# Patient Record
Sex: MALE | Race: BLACK OR AFRICAN AMERICAN | Employment: OTHER | URBAN - METROPOLITAN AREA
[De-identification: names, ages, dates, MRNs, and addresses within clinical notes are randomized per-mention and may not be internally consistent; named-entity substitution may affect disease eponyms.]

---

## 2018-05-15 ENCOUNTER — TELEPHONE (OUTPATIENT)
Dept: CARDIAC REHAB | Age: 73
End: 2018-05-15

## 2018-05-15 NOTE — TELEPHONE ENCOUNTER
Cardiac Rehab called patient and left a message. Additional attempts at contact will be made.      Thank you,  Elyse Scott

## 2018-05-18 ENCOUNTER — TELEPHONE (OUTPATIENT)
Dept: CARDIAC REHAB | Age: 73
End: 2018-05-18

## 2018-05-18 NOTE — TELEPHONE ENCOUNTER
Cardiac Rehab received a call from patient and explained his referral to his wife. She understands and patient is scheduled for an evaluation on 5/29/18 at 8:00.     Thank you,  Lyly Orozco

## 2018-05-29 ENCOUNTER — HOSPITAL ENCOUNTER (OUTPATIENT)
Dept: CARDIAC REHAB | Age: 73
Discharge: HOME | End: 2018-05-29

## 2018-06-04 ENCOUNTER — HOSPITAL ENCOUNTER (OUTPATIENT)
Dept: CARDIAC REHAB | Age: 73
Discharge: HOME | End: 2018-06-04
Payer: OTHER GOVERNMENT

## 2018-06-04 PROCEDURE — 93798 PHYS/QHP OP CAR RHAB W/ECG: CPT

## 2018-06-04 NOTE — PROGRESS NOTES
[address] Service Date: [id number] Session #:  1 Time In:  4190 Time Out: 1330 O2 with therapy:                            L/Min. Patients carry-over of treatment evident by: First exercise class.      OBJECTIVE Objective Comments: 1   Skin [x] - Warm    [] - Cool     [] - Dry   [] - Moist    [] - Dusky   [] - Pale   [] - Cyanosis   Dependent Edema: [x] - None   OR Location:   Blood Glucose: [x]- NA      Fasting:   Pre-Exercise:  Post-Exercise:   Medications taken as prescribed:   [x] - Yes              [] - No Medication Comments:   Pre SpO2:  97     Pre HR:  94 Pre BP:  100/60 Chest Pain:  0 Weight:  201 Other Pain (1-10) and Location:  1   Post SpO2:   96       Post HR:  84 Post BP:  109/66 Chest Pain: 0 Other Pain (1-10) and Location:  1   ECG interpretation at rest: NSR   ECG interpretation post-exercise:  NSR                 Self-Care/ Education   []-CHF/Heart Disease         []- Heart Attack Warning Signs        [x]- Exercise Safety        []- Risk Factors for CAD        [x]- Medications   []- Breathing Techniques    []- Collaborative Self-Mgt                 []- Hypertension            []-Adequate Sleep                []-  Nutrition                                              []-Cholesterol and Lipids    []- Stress Mgt/Emotional Health      []-Diabetes Mellitus       []-Intimacy Concerns with Heart Disease         []-Travel/Environment         []-Returning pre-cardiac activities   []-Home Exercise    []-Fall Precautions   []-Other:     Patient Response []- NA []- Patient actively participated in education    []- Patient disengaged during education     [] - Able to recite main objectives    [] - Unable to recite main objectives [] - Able to demonstrate     [] - Unable to demonstrate   Educational comments:    EXERCISE PRESCRIPTION:        MODALITY   Time   (min) Speed/Level During Exercise Post Exercise      HR PDS   1-10 PES  1-10 Pain  1-10 HR PDS   1-10 PES  1-10 Pain  1-10   stretching/monitoring/deep breathing 20  90 1 1 1 88 1 1 1   Arms 20 10 100 1 4 1 93 1 1 1   Nusatep 20 1 103 1 5 10  R  .leg 94 1 1 1                                Session # Time in: Time out:   MODALITY   Time   (min) Speed/Level During Exercise Post Exercise      HR PDS   1-10 PES  1-10 Pain  1 - 10 HR PDS   1-10 PES  1-10 Pain   1 - 10   stretching/monitoring/deep breathing                                                                           Patient Response to Exercise Prescription   []- ? Dyspnea                        [] - ? Fatigue                              [] - Abnormal responses:  Explain         [x] - ? Pain (Describe location, description of pain, action(s) taken: Pt says he needs surgery for blockage in R. Leg, but has to get heart stronger first. Leg always hurts when he is walking a lot or exerting it such as Nustep. [] - Experienced no problems; no cuing required            [x]- Good effort               []- Fair effort                   []- Poor   effort        [x]- Good motivation         []- Fair motivation                 []- Poor motivation         [] - Appropriate physiological responses                             Exercise Comments:    ASSESSMENT   []- Patient unable to  progress towards  LTGs due to:  []- Illness       []- Weakness/debility       []- Poor effort       []- Poor Motivation       []- Poor Attendance        [x]- Patient progressing towards LTGs as evident by:    [] Decreased dyspnea on exertion                 [] Decreased fatigue                  [] Improved strength                [] Improved aerobic capacity & endurance                [] Decreased pain               [] Increased pacing          [] Improved emotional health          [] Cigarette reduction / Smoking cessation participation            [] Improved self-monitoring at home          [] Improved nutritional awareness/monitoring          [x] Other (Describe):             Additional Comments: PLAN OF CARE [x] - Continue as written   OR    [] - Adjust treatment plan as follows:           Physician supervision provided this date of service by:      Dr. Eliane Aldana   BILLING Code 83782 (non-monitored): Total minutes:    Code 34888 (monitored):             Total minutes: 60    *ECG rhythm attached      [] 1 CR session only   OR     []  2 CR sessions       [x] 1 CR session only     OR     []  2 CR sessions       *EKG Strip:    Gaby Chávez RN  6/4/2018, 2:48 PM

## 2018-06-06 ENCOUNTER — HOSPITAL ENCOUNTER (OUTPATIENT)
Dept: CARDIAC REHAB | Age: 73
Discharge: HOME | End: 2018-06-06
Payer: OTHER GOVERNMENT

## 2018-06-06 PROCEDURE — 93798 PHYS/QHP OP CAR RHAB W/ECG: CPT

## 2018-06-06 NOTE — PROGRESS NOTES
[address] [facility] Service Date: [id number] Session #:  2 Time In:  3772 Time Out: 1330 O2 with therapy:                            L/Min. Patients carry-over of treatment evident by: Pt returned for 2nd exercise session.      OBJECTIVE Objective Comments: none   Skin [x] - Warm    [] - Cool     [] - Dry   [] - Moist    [] - Dusky   [] - Pale   [] - Cyanosis   Dependent Edema: [x] - None   OR Location:   Blood Glucose: [x]- NA      Fasting:   Pre-Exercise:  Post-Exercise:   Medications taken as prescribed:   [x] - Yes              [] - No Medication Comments:   Pre SpO2:       Pre HR:  91 Pre BP:  124/68 Chest Pain:  0 Weight:  201 Other Pain (1-10) and Location:  1   Post SpO2:          Post HR:  96 Post BP:  118/69 Chest Pain: 0 Other Pain (1-10) and Location:  1 -leg pain stopped when he got off Nustep   ECG interpretation at rest: NSR   ECG interpretation post-exercise:  NSR                 Self-Care/ Education   []-CHF/Heart Disease         []- Heart Attack Warning Signs        [x]- Exercise Safety        []- Risk Factors for CAD        [x]- Medications   []- Breathing Techniques    [x]- Collaborative Self-Mgt                 []- Hypertension            []-Adequate Sleep                []-  Nutrition                                              []-Cholesterol and Lipids    []- Stress Mgt/Emotional Health      []-Diabetes Mellitus       []-Intimacy Concerns with Heart Disease         []-Travel/Environment         []-Returning pre-cardiac activities   []-Home Exercise    []-Fall Precautions   []-Other:     Patient Response []- NA [x]- Patient actively participated in education    []- Patient disengaged during education     [] - Able to recite main objectives    [] - Unable to recite main objectives [] - Able to demonstrate     [] - Unable to demonstrate   Educational comments:    EXERCISE PRESCRIPTION:        MODALITY   Time   (min) Speed/Level During Exercise Post Exercise      HR PDS   1-10 PES  1-10 Pain  1-10 HR PDS   1-10 PES  1-10 Pain  1-10   stretching/monitoring/deep breathing 18  90 1 1 1 88 1 1 1   Nustep 21 4 106 1 5 5 legs 97 1 1 5 legs   Arm 21 4 96 1 5 1 93 1 1 1                                Session # Time in: Time out:   MODALITY   Time   (min) Speed/Level During Exercise Post Exercise      HR PDS   1-10 PES  1-10 Pain  1 - 10 HR PDS   1-10 PES  1-10 Pain   1 - 10   stretching/monitoring/deep breathing                                                                           Patient Response to Exercise Prescription   []- ? Dyspnea                        [] - ? Fatigue                              [] - Abnormal responses:  Explain         [x] - ? Pain (Describe location, description of pain, action(s) taken: Pain in legs while on Nustep, rated 5, pt. Says due to blockages for 2.5 years, hurts worse with weight bearing exercise, such as walking.       [] - Experienced no problems; no cuing required            [x]- Good effort               []- Fair effort                   []- Poor   effort        [x]- Good motivation         []- Fair motivation                 []- Poor motivation         [] - Appropriate physiological responses                             Exercise Comments:    ASSESSMENT   []- Patient unable to  progress towards  LTGs due to:  []- Illness       []- Weakness/debility       []- Poor effort       []- Poor Motivation       []- Poor Attendance        [x]- Patient progressing towards LTGs as evident by:    [] Decreased dyspnea on exertion                 [] Decreased fatigue                  [] Improved strength                [] Improved aerobic capacity & endurance                [] Decreased pain               [] Increased pacing          [] Improved emotional health          [] Cigarette reduction / Smoking cessation participation            [] Improved self-monitoring at home          [] Improved nutritional awareness/monitoring          [x] Other (Describe): 2nd exercise class       Additional Comments:    PLAN OF CARE [x] - Continue as written   OR    [] - Adjust treatment plan as follows:           Physician supervision provided this date of service by:      Dr. Katrin Chavez   BILLING Code 85154 (non-monitored): Total minutes:    Code 54457 (monitored):             Total minutes: 60  *ECG rhythm attached      [] 1 CR session only   OR     []  2 CR sessions       [x] 1 CR session only     OR     []  2 CR sessions       *EKG Strip:    Roddy Dunn RN  6/6/2018, 1:35 PM

## 2018-06-08 ENCOUNTER — HOSPITAL ENCOUNTER (OUTPATIENT)
Dept: CARDIAC REHAB | Age: 73
Discharge: HOME | End: 2018-06-08
Payer: OTHER GOVERNMENT

## 2018-06-08 PROCEDURE — 93798 PHYS/QHP OP CAR RHAB W/ECG: CPT

## 2018-06-08 NOTE — PROGRESS NOTES
[address] [facility] Service Date: [id number] Session #:  3 Time In:  6668 Time Out: 1330 O2 with therapy:                            L/Min. Patients carry-over of treatment evident by: Pt. Went night fishing last night and is sore and tired today. He did bring his water bottle in and said that he hydrates well. To see vascular next week about blockages in leg.    OBJECTIVE Objective Comments: none   Skin [x] - Warm    [] - Cool     [] - Dry   [] - Moist    [] - Dusky   [] - Pale   [] - Cyanosis   Dependent Edema: [x] - None   OR Location:   Blood Glucose: [x]- NA      Fasting:   Pre-Exercise:  Post-Exercise:   Medications taken as prescribed:   [x] - Yes              [] - No Medication Comments:   Pre SpO2:       Pre HR:  91 Pre BP:  122/65 Chest Pain:  0 Weight:  201 Other Pain (1-10) and Location:  1   Post SpO2:          Post HR:  93 Post BP:  130/73 Chest Pain: 0 Other Pain (1-10) and Location:  1   ECG interpretation at rest: NSR   ECG interpretation post-exercise:  NSR                 Self-Care/ Education   []-CHF/Heart Disease         []- Heart Attack Warning Signs        [x]- Exercise Safety        []- Risk Factors for CAD        [x]- Medications   []- Breathing Techniques    [x]- Collaborative Self-Mgt                 []- Hypertension            []-Adequate Sleep                []-  Nutrition                                              []-Cholesterol and Lipids    []- Stress Mgt/Emotional Health      []-Diabetes Mellitus       []-Intimacy Concerns with Heart Disease         []-Travel/Environment         [x]-Returning pre-cardiac activities   [x]-Home Exercise    []-Fall Precautions   []-Other:     Patient Response []- NA [x]- Patient actively participated in education    []- Patient disengaged during education     [] - Able to recite main objectives    [] - Unable to recite main objectives [] - Able to demonstrate     [] - Unable to demonstrate   Educational comments: EXERCISE PRESCRIPTION:        MODALITY   Time   (min) Speed/Level During Exercise Post Exercise      HR PDS   1-10 PES  1-10 Pain  1-10 HR PDS   1-10 PES  1-10 Pain  1-10   stretching/monitoring/deep breathing 20  89 1 1 1 86 1 1 1   Arm Bike 20 15 104 1 4 1 99 1 1 1   Nustep 20 5 97 1 4 1 94 1 1 1                                Session # Time in: Time out:   MODALITY   Time   (min) Speed/Level During Exercise Post Exercise      HR PDS   1-10 PES  1-10 Pain  1 - 10 HR PDS   1-10 PES  1-10 Pain   1 - 10   stretching/monitoring/deep breathing                                                                           Patient Response to Exercise Prescription   []- ? Dyspnea                        [] - ? Fatigue                              [] - Abnormal responses:  Explain         [x] - ? Pain (Describe location, description of pain, action(s) taken: Chronic pain with exercise or exertion in leg due to blockages.       [x] - Experienced no problems; no cuing required            [x]- Good effort               []- Fair effort                   []- Poor   effort        [x]- Good motivation         []- Fair motivation                 []- Poor motivation         [x] - Appropriate physiological responses                             Exercise Comments:    ASSESSMENT   []- Patient unable to  progress towards  LTGs due to:  []- Illness       []- Weakness/debility       []- Poor effort       []- Poor Motivation       []- Poor Attendance        [x]- Patient progressing towards LTGs as evident by:    [] Decreased dyspnea on exertion                 [] Decreased fatigue                  [x] Improved strength                [] Improved aerobic capacity & endurance                [] Decreased pain               [] Increased pacing          [] Improved emotional health          [] Cigarette reduction / Smoking cessation participation            [] Improved self-monitoring at home          [] Improved nutritional awareness/monitoring          [] Other (Describe): Additional Comments:    PLAN OF CARE [x] - Continue as written   OR    [] - Adjust treatment plan as follows:           Physician supervision provided this date of service by:  Dr. Katrin Chavez       BILLING Code 07404 (non-monitored): Total minutes:    Code 45782 (monitored):             Total minutes: 60  *ECG rhythm attached      [] 1 CR session only   OR     []  2 CR sessions       [x] 1 CR session only     OR     []  2 CR sessions       *EKG Strip:    Roddy Dunn RN  6/8/2018, 1:46 PM

## 2018-06-11 ENCOUNTER — TELEPHONE (OUTPATIENT)
Dept: CARDIAC REHAB | Age: 73
End: 2018-06-11

## 2018-06-11 NOTE — TELEPHONE ENCOUNTER
Patient phoned to say that it is raining and he won't be in today, as his scooter cannot go out in the rain. Will return on Wednesday to Rehab.

## 2018-06-15 ENCOUNTER — TELEPHONE (OUTPATIENT)
Dept: CARDIAC REHAB | Age: 73
End: 2018-06-15

## 2018-06-15 NOTE — TELEPHONE ENCOUNTER
Cardiac Rehab called patient to ask about his attendance. He stated he has had a terrible cold and will return on Monday, 6/18/18.      Thank you,  Karen Parker

## 2018-06-18 ENCOUNTER — HOSPITAL ENCOUNTER (OUTPATIENT)
Dept: CARDIAC REHAB | Age: 73
Discharge: HOME | End: 2018-06-18
Payer: OTHER GOVERNMENT

## 2018-06-18 PROCEDURE — 93798 PHYS/QHP OP CAR RHAB W/ECG: CPT

## 2018-06-18 NOTE — PROGRESS NOTES
[address] Service Date: [id number] Session #:  4 Time In:  5487 Time Out: 1330 O2 with therapy:                            L/Min. Patients carry-over of treatment evident by: Pt. Unable to attend last week due to transportation.      OBJECTIVE Objective Comments: none   Skin [x] - Warm    [] - Cool     [] - Dry   [] - Moist    [] - Dusky   [] - Pale   [] - Cyanosis   Dependent Edema: [x] - None   OR Location:   Blood Glucose: [x]- NA      Fasting:   Pre-Exercise:  Post-Exercise:   Medications taken as prescribed:   [x] - Yes              [] - No Medication Comments:   Pre SpO2:       Pre HR:  81 Pre BP:  119/79 Chest Pain:  0 Weight:  203.7 Other Pain (1-10) and Location:  1   Post SpO2:          Post HR:  100 Post BP:  123/68 Chest Pain: 0 Other Pain (1-10) and Location:  1   ECG interpretation at rest: NSR   ECG interpretation post-exercise:  NSR                 Self-Care/ Education   []-CHF/Heart Disease         []- Heart Attack Warning Signs        [x]- Exercise Safety        []- Risk Factors for CAD        [x]- Medications   []- Breathing Techniques    []- Collaborative Self-Mgt                 []- Hypertension            []-Adequate Sleep                []-  Nutrition                                              []-Cholesterol and Lipids    []- Stress Mgt/Emotional Health      []-Diabetes Mellitus       []-Intimacy Concerns with Heart Disease         []-Travel/Environment         []-Returning pre-cardiac activities   []-Home Exercise    []-Fall Precautions   []-Other:     Patient Response []- NA [x]- Patient actively participated in education    []- Patient disengaged during education     [] - Able to recite main objectives    [] - Unable to recite main objectives [] - Able to demonstrate     [] - Unable to demonstrate   Educational comments:    EXERCISE PRESCRIPTION:        MODALITY   Time   (min) Speed/Level During Exercise Post Exercise      HR PDS   1-10 PES  1-10 Pain  1-10 HR PDS   1-10 PES  1-10 Pain  1-10   stretching/monitoring/deep breathing 20  80 1 1 1 75 1 1 1   Arm Bike 20 10 106 1 4 1 99 1 1 1   Nustep 21 4 112 1 4 1 103 1 1 1                                Session # Time in: Time out:   MODALITY   Time   (min) Speed/Level During Exercise Post Exercise      HR PDS   1-10 PES  1-10 Pain  1 - 10 HR PDS   1-10 PES  1-10 Pain   1 - 10   stretching/monitoring/deep breathing                                                                           Patient Response to Exercise Prescription   []- ? Dyspnea                        [] - ? Fatigue                              [] - Abnormal responses:  Explain         [] - ? Pain (Describe location, description of pain, action(s) taken:         [x] - Experienced no problems; no cuing required            [x]- Good effort               []- Fair effort                   []- Poor   effort        [x]- Good motivation         []- Fair motivation                 []- Poor motivation         [x] - Appropriate physiological responses                             Exercise Comments:    ASSESSMENT   []- Patient unable to  progress towards  LTGs due to:  []- Illness       []- Weakness/debility       []- Poor effort       []- Poor Motivation       []- Poor Attendance        [x]- Patient progressing towards LTGs as evident by:    [] Decreased dyspnea on exertion                 [] Decreased fatigue                  [x] Improved strength                [x] Improved aerobic capacity & endurance                [] Decreased pain               [] Increased pacing          [] Improved emotional health          [] Cigarette reduction / Smoking cessation participation            [] Improved self-monitoring at home          [] Improved nutritional awareness/monitoring          [] Other (Describe):             Additional Comments:    PLAN OF CARE [x] - Continue as written   OR    [] - Adjust treatment plan as follows:           Physician supervision provided this date of service by:         BILLING Code 59219 (non-monitored): Total minutes:    Code 29952 (monitored):             Total minutes: 61  *ECG rhythm attached      [] 1 CR session only   OR     []  2 CR sessions       [x] 1 CR session only     OR     []  2 CR sessions       *EKG Strip:    Tyron See RN  6/18/2018, 3:42 PM

## 2018-06-20 ENCOUNTER — TELEPHONE (OUTPATIENT)
Dept: CARDIAC REHAB | Age: 73
End: 2018-06-20

## 2018-06-20 NOTE — TELEPHONE ENCOUNTER
Cardiac Rehab called patient to talk about his absence today and also about his schedule for next week. He stated that he was aching today and could not come in. He will be here next Tuesday and Wednesday (6/26/18 and 6/27/18) at 12:30.     Thank you,  Aileen Suarez

## 2018-06-22 ENCOUNTER — APPOINTMENT (OUTPATIENT)
Dept: CARDIAC REHAB | Age: 73
End: 2018-06-22
Payer: OTHER GOVERNMENT

## 2018-06-25 ENCOUNTER — APPOINTMENT (OUTPATIENT)
Dept: CARDIAC REHAB | Age: 73
End: 2018-06-25
Payer: OTHER GOVERNMENT

## 2018-06-26 ENCOUNTER — TELEPHONE (OUTPATIENT)
Dept: CARDIAC REHAB | Age: 73
End: 2018-06-26

## 2018-06-26 NOTE — TELEPHONE ENCOUNTER
Cardiac Rehab called patient to talk to him about absence from his appointment today. Patient was coughing and was extremely short of breath. He stated that he is under the weather and might be going to the hospital shortly if symptoms don't get better. Will follow up for an update.      Thank you,  Karen Parker

## 2018-06-27 ENCOUNTER — APPOINTMENT (OUTPATIENT)
Dept: CARDIAC REHAB | Age: 73
End: 2018-06-27
Payer: OTHER GOVERNMENT

## 2018-06-29 ENCOUNTER — APPOINTMENT (OUTPATIENT)
Dept: CARDIAC REHAB | Age: 73
End: 2018-06-29
Payer: OTHER GOVERNMENT

## 2018-07-02 ENCOUNTER — HOSPITAL ENCOUNTER (OUTPATIENT)
Dept: CARDIAC REHAB | Age: 73
Discharge: HOME | End: 2018-07-02
Payer: OTHER GOVERNMENT

## 2018-07-02 VITALS — WEIGHT: 200 LBS | BODY MASS INDEX: 31.32 KG/M2

## 2018-07-02 PROCEDURE — 93798 PHYS/QHP OP CAR RHAB W/ECG: CPT

## 2018-07-06 ENCOUNTER — HOSPITAL ENCOUNTER (OUTPATIENT)
Dept: CARDIAC REHAB | Age: 73
Discharge: HOME | End: 2018-07-06
Payer: OTHER GOVERNMENT

## 2018-07-06 VITALS — WEIGHT: 200 LBS | BODY MASS INDEX: 31.32 KG/M2

## 2018-07-06 PROCEDURE — 93798 PHYS/QHP OP CAR RHAB W/ECG: CPT

## 2018-07-09 ENCOUNTER — HOSPITAL ENCOUNTER (OUTPATIENT)
Dept: CARDIAC REHAB | Age: 73
Discharge: HOME | End: 2018-07-09
Payer: OTHER GOVERNMENT

## 2018-07-09 VITALS — BODY MASS INDEX: 32.26 KG/M2 | WEIGHT: 206 LBS

## 2018-07-09 PROCEDURE — 93798 PHYS/QHP OP CAR RHAB W/ECG: CPT

## 2018-07-11 ENCOUNTER — HOSPITAL ENCOUNTER (OUTPATIENT)
Dept: CARDIAC REHAB | Age: 73
Discharge: HOME | End: 2018-07-11
Payer: OTHER GOVERNMENT

## 2018-07-11 VITALS — BODY MASS INDEX: 31.95 KG/M2 | WEIGHT: 204 LBS

## 2018-07-11 PROCEDURE — 93798 PHYS/QHP OP CAR RHAB W/ECG: CPT

## 2018-07-13 ENCOUNTER — HOSPITAL ENCOUNTER (OUTPATIENT)
Dept: CARDIAC REHAB | Age: 73
Discharge: HOME | End: 2018-07-13
Payer: OTHER GOVERNMENT

## 2018-07-13 VITALS — WEIGHT: 204 LBS | BODY MASS INDEX: 31.95 KG/M2

## 2018-07-13 PROCEDURE — 93798 PHYS/QHP OP CAR RHAB W/ECG: CPT

## 2018-07-16 ENCOUNTER — HOSPITAL ENCOUNTER (OUTPATIENT)
Dept: CARDIAC REHAB | Age: 73
Discharge: HOME | End: 2018-07-16
Payer: OTHER GOVERNMENT

## 2018-07-16 VITALS — BODY MASS INDEX: 31.95 KG/M2 | WEIGHT: 204 LBS

## 2018-07-16 PROCEDURE — 93798 PHYS/QHP OP CAR RHAB W/ECG: CPT

## 2018-07-23 ENCOUNTER — TELEPHONE (OUTPATIENT)
Dept: CARDIAC REHAB | Age: 73
End: 2018-07-23

## 2018-07-23 NOTE — TELEPHONE ENCOUNTER
Cardiac Rehab called patient and left a message about his absence for his past few scheduled visits. Additional attempts at contact will be made.      Thank you,  Brandon Plaza

## 2018-07-25 ENCOUNTER — TELEPHONE (OUTPATIENT)
Dept: CARDIAC REHAB | Age: 73
End: 2018-07-25

## 2018-07-25 NOTE — TELEPHONE ENCOUNTER
Cardiac Rehab called patient and left a message about his absence. Additional attempts at contact will be made.      Thank you,  Toni Dawson

## 2018-07-27 ENCOUNTER — TELEPHONE (OUTPATIENT)
Dept: CARDIAC REHAB | Age: 73
End: 2018-07-27

## 2018-07-27 NOTE — TELEPHONE ENCOUNTER
Cardiac Rehab called patient and left a message about his absences. Additional attempts at contact will be made.      Thank you,  Carlos Nuno

## 2018-07-30 ENCOUNTER — TELEPHONE (OUTPATIENT)
Dept: CARDIAC REHAB | Age: 73
End: 2018-07-30

## 2018-07-30 NOTE — TELEPHONE ENCOUNTER
Cardiac Rehab called patient and left a message about his absence. Additional attempts at contact will be made.      Thank you,  Girma Fitzpatrick

## 2018-08-03 ENCOUNTER — APPOINTMENT (OUTPATIENT)
Dept: CARDIAC REHAB | Age: 73
End: 2018-08-03

## 2018-08-03 ENCOUNTER — TELEPHONE (OUTPATIENT)
Dept: CARDIAC REHAB | Age: 73
End: 2018-08-03

## 2018-08-03 NOTE — TELEPHONE ENCOUNTER
Cardiac Rehab called patient and spoke to him about his absence. He said he has not been feeling well and wishes to be discharged.      Thank you,  Seble Desouza

## 2018-08-06 ENCOUNTER — APPOINTMENT (OUTPATIENT)
Dept: CARDIAC REHAB | Age: 73
End: 2018-08-06

## 2018-08-08 ENCOUNTER — APPOINTMENT (OUTPATIENT)
Dept: CARDIAC REHAB | Age: 73
End: 2018-08-08

## 2018-08-10 ENCOUNTER — APPOINTMENT (OUTPATIENT)
Dept: CARDIAC REHAB | Age: 73
End: 2018-08-10

## 2018-08-13 ENCOUNTER — APPOINTMENT (OUTPATIENT)
Dept: CARDIAC REHAB | Age: 73
End: 2018-08-13

## 2018-08-15 ENCOUNTER — APPOINTMENT (OUTPATIENT)
Dept: CARDIAC REHAB | Age: 73
End: 2018-08-15

## 2018-08-17 ENCOUNTER — APPOINTMENT (OUTPATIENT)
Dept: CARDIAC REHAB | Age: 73
End: 2018-08-17

## 2018-08-20 ENCOUNTER — APPOINTMENT (OUTPATIENT)
Dept: CARDIAC REHAB | Age: 73
End: 2018-08-20

## 2018-08-22 ENCOUNTER — APPOINTMENT (OUTPATIENT)
Dept: CARDIAC REHAB | Age: 73
End: 2018-08-22

## 2018-08-24 ENCOUNTER — APPOINTMENT (OUTPATIENT)
Dept: CARDIAC REHAB | Age: 73
End: 2018-08-24

## 2019-07-29 ENCOUNTER — HOSPITAL ENCOUNTER (INPATIENT)
Age: 74
LOS: 1 days | Discharge: HOME | DRG: 195 | End: 2019-07-30
Attending: EMERGENCY MEDICINE | Admitting: FAMILY MEDICINE
Payer: MEDICARE

## 2019-07-29 ENCOUNTER — APPOINTMENT (OUTPATIENT)
Dept: GENERAL RADIOLOGY | Age: 74
DRG: 195 | End: 2019-07-29
Attending: EMERGENCY MEDICINE
Payer: MEDICARE

## 2019-07-29 DIAGNOSIS — J18.9 COMMUNITY ACQUIRED PNEUMONIA, UNSPECIFIED LATERALITY: Primary | ICD-10-CM

## 2019-07-29 LAB
ANION GAP SERPL CALC-SCNC: 7 MMOL/L (ref 3–18)
APPEARANCE UR: CLEAR
ATRIAL RATE: 75 BPM
BASOPHILS # BLD: 0 K/UL (ref 0–0.1)
BASOPHILS NFR BLD: 0 % (ref 0–2)
BILIRUB UR QL: NEGATIVE
BUN SERPL-MCNC: 26 MG/DL (ref 7–18)
BUN/CREAT SERPL: 25 (ref 12–20)
CALCIUM SERPL-MCNC: 9.4 MG/DL (ref 8.5–10.1)
CALCULATED P AXIS, ECG09: 48 DEGREES
CALCULATED R AXIS, ECG10: -8 DEGREES
CALCULATED T AXIS, ECG11: 54 DEGREES
CHLORIDE SERPL-SCNC: 108 MMOL/L (ref 100–111)
CK MB CFR SERPL CALC: 2.9 % (ref 0–4)
CK MB CFR SERPL CALC: 3.9 % (ref 0–4)
CK MB SERPL-MCNC: 1.3 NG/ML (ref 5–25)
CK MB SERPL-MCNC: 1.5 NG/ML (ref 5–25)
CK SERPL-CCNC: 33 U/L (ref 39–308)
CK SERPL-CCNC: 52 U/L (ref 39–308)
CO2 SERPL-SCNC: 26 MMOL/L (ref 21–32)
COLOR UR: YELLOW
CREAT SERPL-MCNC: 1.04 MG/DL (ref 0.6–1.3)
DIAGNOSIS, 93000: NORMAL
DIFFERENTIAL METHOD BLD: ABNORMAL
EOSINOPHIL # BLD: 0.3 K/UL (ref 0–0.4)
EOSINOPHIL NFR BLD: 4 % (ref 0–5)
ERYTHROCYTE [DISTWIDTH] IN BLOOD BY AUTOMATED COUNT: 14.6 % (ref 11.6–14.5)
EST. AVERAGE GLUCOSE BLD GHB EST-MCNC: 160 MG/DL
GLUCOSE BLD STRIP.AUTO-MCNC: 130 MG/DL (ref 70–110)
GLUCOSE BLD STRIP.AUTO-MCNC: 146 MG/DL (ref 70–110)
GLUCOSE SERPL-MCNC: 132 MG/DL (ref 74–99)
GLUCOSE UR STRIP.AUTO-MCNC: 500 MG/DL
HBA1C MFR BLD: 7.2 % (ref 4.2–5.6)
HCT VFR BLD AUTO: 42.2 % (ref 36–48)
HGB BLD-MCNC: 14.1 G/DL (ref 13–16)
HGB UR QL STRIP: NEGATIVE
KETONES UR QL STRIP.AUTO: NEGATIVE MG/DL
LACTATE SERPL-SCNC: 1.6 MMOL/L (ref 0.4–2)
LEUKOCYTE ESTERASE UR QL STRIP.AUTO: NEGATIVE
LYMPHOCYTES # BLD: 2 K/UL (ref 0.9–3.6)
LYMPHOCYTES NFR BLD: 21 % (ref 21–52)
MCH RBC QN AUTO: 30.2 PG (ref 24–34)
MCHC RBC AUTO-ENTMCNC: 33.4 G/DL (ref 31–37)
MCV RBC AUTO: 90.4 FL (ref 74–97)
MONOCYTES # BLD: 0.7 K/UL (ref 0.05–1.2)
MONOCYTES NFR BLD: 7 % (ref 3–10)
NEUTS SEG # BLD: 6.5 K/UL (ref 1.8–8)
NEUTS SEG NFR BLD: 68 % (ref 40–73)
NITRITE UR QL STRIP.AUTO: NEGATIVE
P-R INTERVAL, ECG05: 178 MS
PH UR STRIP: 5 [PH] (ref 5–8)
PLATELET # BLD AUTO: 288 K/UL (ref 135–420)
PMV BLD AUTO: 8.4 FL (ref 9.2–11.8)
POTASSIUM SERPL-SCNC: 4.1 MMOL/L (ref 3.5–5.5)
PROT UR STRIP-MCNC: NEGATIVE MG/DL
Q-T INTERVAL, ECG07: 402 MS
QRS DURATION, ECG06: 76 MS
QTC CALCULATION (BEZET), ECG08: 448 MS
RBC # BLD AUTO: 4.67 M/UL (ref 4.7–5.5)
SODIUM SERPL-SCNC: 141 MMOL/L (ref 136–145)
SP GR UR REFRACTOMETRY: 1.01 (ref 1–1.03)
TROPONIN I SERPL-MCNC: <0.02 NG/ML (ref 0–0.04)
TROPONIN I SERPL-MCNC: <0.02 NG/ML (ref 0–0.04)
UROBILINOGEN UR QL STRIP.AUTO: 0.2 EU/DL (ref 0.2–1)
VENTRICULAR RATE, ECG03: 75 BPM
WBC # BLD AUTO: 9.5 K/UL (ref 4.6–13.2)

## 2019-07-29 PROCEDURE — 74011250637 HC RX REV CODE- 250/637: Performed by: FAMILY MEDICINE

## 2019-07-29 PROCEDURE — 74011250636 HC RX REV CODE- 250/636: Performed by: EMERGENCY MEDICINE

## 2019-07-29 PROCEDURE — 71045 X-RAY EXAM CHEST 1 VIEW: CPT

## 2019-07-29 PROCEDURE — 36415 COLL VENOUS BLD VENIPUNCTURE: CPT

## 2019-07-29 PROCEDURE — 87040 BLOOD CULTURE FOR BACTERIA: CPT

## 2019-07-29 PROCEDURE — 74011000250 HC RX REV CODE- 250: Performed by: EMERGENCY MEDICINE

## 2019-07-29 PROCEDURE — 96360 HYDRATION IV INFUSION INIT: CPT

## 2019-07-29 PROCEDURE — 82550 ASSAY OF CK (CPK): CPT

## 2019-07-29 PROCEDURE — 74011250636 HC RX REV CODE- 250/636: Performed by: FAMILY MEDICINE

## 2019-07-29 PROCEDURE — 65270000029 HC RM PRIVATE

## 2019-07-29 PROCEDURE — 83036 HEMOGLOBIN GLYCOSYLATED A1C: CPT

## 2019-07-29 PROCEDURE — 82962 GLUCOSE BLOOD TEST: CPT

## 2019-07-29 PROCEDURE — 93005 ELECTROCARDIOGRAM TRACING: CPT

## 2019-07-29 PROCEDURE — 83605 ASSAY OF LACTIC ACID: CPT

## 2019-07-29 PROCEDURE — 80048 BASIC METABOLIC PNL TOTAL CA: CPT

## 2019-07-29 PROCEDURE — 85025 COMPLETE CBC W/AUTO DIFF WBC: CPT

## 2019-07-29 PROCEDURE — 99285 EMERGENCY DEPT VISIT HI MDM: CPT

## 2019-07-29 PROCEDURE — 81003 URINALYSIS AUTO W/O SCOPE: CPT

## 2019-07-29 RX ORDER — SODIUM CHLORIDE 0.9 % (FLUSH) 0.9 %
5-40 SYRINGE (ML) INJECTION AS NEEDED
Status: DISCONTINUED | OUTPATIENT
Start: 2019-07-29 | End: 2019-07-30 | Stop reason: HOSPADM

## 2019-07-29 RX ORDER — FLUOXETINE 10 MG/1
10 CAPSULE ORAL DAILY
Status: DISCONTINUED | OUTPATIENT
Start: 2019-07-30 | End: 2019-07-30 | Stop reason: HOSPADM

## 2019-07-29 RX ORDER — TRAZODONE HYDROCHLORIDE 50 MG/1
50 TABLET ORAL
Status: DISCONTINUED | OUTPATIENT
Start: 2019-07-29 | End: 2019-07-30 | Stop reason: HOSPADM

## 2019-07-29 RX ORDER — INSULIN LISPRO 100 [IU]/ML
INJECTION, SOLUTION INTRAVENOUS; SUBCUTANEOUS
Status: DISCONTINUED | OUTPATIENT
Start: 2019-07-29 | End: 2019-07-30 | Stop reason: HOSPADM

## 2019-07-29 RX ORDER — MAGNESIUM SULFATE 100 %
4 CRYSTALS MISCELLANEOUS AS NEEDED
Status: DISCONTINUED | OUTPATIENT
Start: 2019-07-29 | End: 2019-07-30 | Stop reason: HOSPADM

## 2019-07-29 RX ORDER — HEPARIN SODIUM 5000 [USP'U]/ML
5000 INJECTION, SOLUTION INTRAVENOUS; SUBCUTANEOUS EVERY 8 HOURS
Status: DISCONTINUED | OUTPATIENT
Start: 2019-07-29 | End: 2019-07-30 | Stop reason: HOSPADM

## 2019-07-29 RX ORDER — DEXTROSE 50 % IN WATER (D50W) INTRAVENOUS SYRINGE
25-50 AS NEEDED
Status: DISCONTINUED | OUTPATIENT
Start: 2019-07-29 | End: 2019-07-29 | Stop reason: CLARIF

## 2019-07-29 RX ORDER — ACETAMINOPHEN 325 MG/1
650 TABLET ORAL
Status: DISCONTINUED | OUTPATIENT
Start: 2019-07-29 | End: 2019-07-30 | Stop reason: HOSPADM

## 2019-07-29 RX ORDER — GABAPENTIN 100 MG/1
100 CAPSULE ORAL 3 TIMES DAILY
Status: DISCONTINUED | OUTPATIENT
Start: 2019-07-29 | End: 2019-07-30 | Stop reason: HOSPADM

## 2019-07-29 RX ORDER — SODIUM CHLORIDE 0.9 % (FLUSH) 0.9 %
5-40 SYRINGE (ML) INJECTION EVERY 8 HOURS
Status: DISCONTINUED | OUTPATIENT
Start: 2019-07-29 | End: 2019-07-30 | Stop reason: HOSPADM

## 2019-07-29 RX ORDER — DEXTROSE MONOHYDRATE 100 MG/ML
125-250 INJECTION, SOLUTION INTRAVENOUS AS NEEDED
Status: DISCONTINUED | OUTPATIENT
Start: 2019-07-29 | End: 2019-07-30 | Stop reason: HOSPADM

## 2019-07-29 RX ADMIN — GABAPENTIN 100 MG: 100 CAPSULE ORAL at 18:01

## 2019-07-29 RX ADMIN — TRAZODONE HYDROCHLORIDE 50 MG: 50 TABLET ORAL at 22:39

## 2019-07-29 RX ADMIN — GABAPENTIN 100 MG: 100 CAPSULE ORAL at 22:39

## 2019-07-29 RX ADMIN — CEFTRIAXONE SODIUM 2 G: 2 INJECTION, POWDER, FOR SOLUTION INTRAMUSCULAR; INTRAVENOUS at 13:05

## 2019-07-29 RX ADMIN — SODIUM CHLORIDE 1000 ML: 900 INJECTION, SOLUTION INTRAVENOUS at 11:30

## 2019-07-29 RX ADMIN — Medication 10 ML: at 18:05

## 2019-07-29 RX ADMIN — Medication 10 ML: at 22:41

## 2019-07-29 RX ADMIN — HEPARIN SODIUM 5000 UNITS: 5000 INJECTION INTRAVENOUS; SUBCUTANEOUS at 18:01

## 2019-07-29 RX ADMIN — AZITHROMYCIN MONOHYDRATE 500 MG: 500 INJECTION, POWDER, LYOPHILIZED, FOR SOLUTION INTRAVENOUS at 13:05

## 2019-07-29 RX ADMIN — SODIUM CHLORIDE 1000 ML: 900 INJECTION, SOLUTION INTRAVENOUS at 13:05

## 2019-07-29 NOTE — ED TRIAGE NOTES
Received pt via EMS. Pt originally complained of chest pain, denied chest pain once medics arrived. He took one nitro at home. In the ambulance, pt took a deep breath and then c/o chest pain again. He takes blood thinners. He has hx of CABG and type 1 diabetes. C/o chest pain 4/10 left of sternum.

## 2019-07-29 NOTE — ED PROVIDER NOTES
EMERGENCY DEPARTMENT HISTORY AND PHYSICAL EXAM    2:03 PM  Date: 7/29/2019  Patient Name: Charlie Valdez    History of Presenting Illness     Chief Complaint   Patient presents with    Chest Pain        History Provided By: Patient    HPI: Charlie Valdez is a 75 y.o. male with history of diabetes and alcohol abuse. Patient is presenting with around 8. He which helped to relieve the pain but he felt diaphoretic and dizzy afterwards. He is also reporting nonproductive cough for the past 2 days chills. No nausea or vomiting. No shortness of breath. Denies history of recent hospital admissions or travel, denies history of prior PE    Location:  Severity:  Timing/course: Onset/Duration:     PCP: Other, MD Amanda    Past History     Past Medical History:  Past Medical History:   Diagnosis Date    Arthritis     Depression     Diabetes ([address]     ETOH abuse     Psychiatric disorder     PTSD       Past Surgical History:  No past surgical history on file. Family History:  No family history on file. Social History:  Social History     Tobacco Use    Smoking status: Current Every Day Smoker     Packs/day: 1.00   Substance Use Topics    Alcohol use: Yes    Drug use: Not on file       Allergies: Allergies   Allergen Reactions    Iodine Unknown (comments)    Shellfish Derived Unknown (comments)       Review of Systems   Review of Systems   Constitutional: Positive for diaphoresis. Respiratory: Positive for cough and chest tightness. Cardiovascular: Positive for chest pain. All other systems reviewed and are negative. Physical Exam     Patient Vitals for the past 12 hrs:   Temp Pulse Resp BP SpO2   07/29/19 1034 98.5 °F (36.9 °C) 76 16 103/62 100 %   07/29/19 1033 -- -- -- 103/62 99 %       Physical Exam   Constitutional: He is oriented to person, place, and time. He appears well-developed and well-nourished. No distress. HENT:   Head: Normocephalic and atraumatic.    Eyes: Conjunctivae and EOM are normal.   Neck: Normal range of motion. Neck supple. Cardiovascular: Normal rate and normal heart sounds. Pulmonary/Chest: Effort normal and breath sounds normal. No respiratory distress. Musculoskeletal: Normal range of motion. He exhibits no edema or deformity. Neurological: He is alert and oriented to person, place, and time. Skin: Skin is warm and dry. Psychiatric: He has a normal mood and affect. His behavior is normal. Judgment and thought content normal.       Diagnostic Study Results     Labs -  Recent Results (from the past 12 hour(s))   CBC WITH AUTOMATED DIFF    Collection Time: 07/29/19 10:45 AM   Result Value Ref Range    WBC 9.5 4.6 - 13.2 K/uL    RBC 4.67 (L) 4.70 - 5.50 M/uL    HGB 14.1 13.0 - 16.0 g/dL    HCT 42.2 36.0 - 48.0 %    MCV 90.4 74.0 - 97.0 FL    MCH 30.2 24.0 - 34.0 PG    MCHC 33.4 31.0 - 37.0 g/dL    RDW 14.6 (H) 11.6 - 14.5 %    PLATELET 759 984 - 701 K/uL    MPV 8.4 (L) 9.2 - 11.8 FL    NEUTROPHILS 68 40 - 73 %    LYMPHOCYTES 21 21 - 52 %    MONOCYTES 7 3 - 10 %    EOSINOPHILS 4 0 - 5 %    BASOPHILS 0 0 - 2 %    ABS. NEUTROPHILS 6.5 1.8 - 8.0 K/UL    ABS. LYMPHOCYTES 2.0 0.9 - 3.6 K/UL    ABS. MONOCYTES 0.7 0.05 - 1.2 K/UL    ABS. EOSINOPHILS 0.3 0.0 - 0.4 K/UL    ABS.  BASOPHILS 0.0 0.0 - 0.1 K/UL    DF AUTOMATED     METABOLIC PANEL, BASIC    Collection Time: 07/29/19 10:45 AM   Result Value Ref Range    Sodium 141 136 - 145 mmol/L    Potassium 4.1 3.5 - 5.5 mmol/L    Chloride 108 100 - 111 mmol/L    CO2 26 21 - 32 mmol/L    Anion gap 7 3.0 - 18 mmol/L    Glucose 132 (H) 74 - 99 mg/dL    BUN 26 (H) 7.0 - 18 MG/DL    Creatinine 1.04 0.6 - 1.3 MG/DL    BUN/Creatinine ratio 25 (H) 12 - 20      GFR est AA >60 >60 ml/min/1.73m2    GFR est non-AA >60 >60 ml/min/1.73m2    Calcium 9.4 8.5 - 10.1 MG/DL   EKG, 12 LEAD, INITIAL    Collection Time: 07/29/19 10:47 AM   Result Value Ref Range    Ventricular Rate 75 BPM    Atrial Rate 75 BPM    P-R Interval 178 ms    QRS Duration 76 ms    Q-T Interval 402 ms    QTC Calculation (Bezet) 448 ms    Calculated P Axis 48 degrees    Calculated R Axis -8 degrees    Calculated T Axis 54 degrees    Diagnosis       Normal sinus rhythm  Inferior infarct , age undetermined  Anteroseptal infarct (cited on or before 29-JUL-2019)  Abnormal ECG  When compared with ECG of 03-AUG-2014 11:54,  Inferior infarct is now present  Questionable change in initial forces of Septal leads  Confirmed by Shahid Huffman MD, --- ([id number]) on 7/29/2019 1:20:45 PM     URINALYSIS W/ RFLX MICROSCOPIC    Collection Time: 07/29/19 11:20 AM   Result Value Ref Range    Color YELLOW      Appearance CLEAR      Specific gravity 1.008 1.005 - 1.030      pH (UA) 5.0 5.0 - 8.0      Protein NEGATIVE  NEG mg/dL    Glucose 500 (A) NEG mg/dL    Ketone NEGATIVE  NEG mg/dL    Bilirubin NEGATIVE  NEG      Blood NEGATIVE  NEG      Urobilinogen 0.2 0.2 - 1.0 EU/dL    Nitrites NEGATIVE  NEG      Leukocyte Esterase NEGATIVE  NEG         Radiologic Studies -   Xr Chest Port    Result Date: 7/29/2019  IMPRESSION: Lower lung zone hazy streaky densities suggestive of bilateral pneumonia vs. atelectatic changes vs. aspiration. Medical Decision Making     ED Course: Progress Notes, Reevaluation, and Consults:    2:03 PM Initial assessment performed. The patients presenting problems have been discussed, and they/their family are in agreement with the care plan formulated and outlined with them. I have encouraged them to ask questions as they arise throughout their visit. X-ray shows pneumonia. We will add cultures and start antibiotic coverage for community-acquired pneumonia. Patient had sugar initially but is likely related to the nature he took he is well-hydrated and not tachycardic and febrile do not think this is sepsis but I will give him a liter of IV fluids and provide more as needed. I also contact the hospitalist for admission.       Provider Notes (Medical Decision Making): 44-year-old gentleman presenting with sternal nonexertional chest pain was alleviated by nitro followed by diaphoresis. Is concerning for cardiac etiology. Cardiac work-up and give her aspirin. Also has a history of cough so so we will get him worked up for that as well. Procedures:     Critical Care Time:     Vital Signs-Reviewed the patient's vital signs. Reviewed pt's pulse ox reading. EKG: Interpreted by the EP. Time Interpreted:    Rate:    Rhythm: Normal Sinus Rhythm 75   Interpretation: T wave inversions in V1   Comparison: Significant interval changes    Records Reviewed: Nursing Notes, Old Medical Records and Previous electrocardiograms (Time of Review: 2:03 PM)  -I am the first provider for this patient.  -I reviewed the vital signs, available nursing notes, past medical history, past surgical history, family history and social history. Current Facility-Administered Medications   Medication Dose Route Frequency Provider Last Rate Last Dose    cefTRIAXone (ROCEPHIN) 2 g in sterile water (preservative free) 20 mL IV syringe  2 g IntraVENous Q24H Brooke Cavanaugh MD   2 g at 07/29/19 1305    azithromycin (ZITHROMAX) 500 mg in  mL  500 mg IntraVENous Q24H Brooke Cavanaugh MD   500 mg at 07/29/19 1305     Current Outpatient Medications   Medication Sig Dispense Refill    oxyCODONE-acetaminophen (PERCOCET) 5-325 mg per tablet Take 2 Tabs by mouth every six (6) hours as needed for Pain. 30 Tab 0    metFORMIN (GLUCOPHAGE) 1,000 mg tablet Take 1,000 mg by mouth daily.  traZODone (DESYREL) 50 mg tablet Take 50 mg by mouth nightly.  gabapentin (NEURONTIN) 100 mg capsule Take 100 mg by mouth three (3) times daily.  FLUoxetine (PROZAC) 10 mg capsule Take 10 mg by mouth daily.  insulin aspart protamine/insulin aspart (NOVOLOG MIX 70/30) 100 unit/mL (70-30) flex pen by SubCUTAneous route. Clinical Impression     Clinical Impression: No diagnosis found.     Disposition: admit      This note was dictated utilizing voice recognition software which may lead to typographical errors. I apologize in advance if the situation occurs. If questions arise please do not hesitate to contact me or call our department.     Brooke Lai MD  2:03 PM

## 2019-07-29 NOTE — ED NOTES
TRANSFER - OUT REPORT:    Verbal report given to Bonita Finch RN on [place]  being transferred to [address] [place] [address] for routine progression of care       Report consisted of patients Situation, Background, Assessment and   Recommendations(SBAR). Information from the following report(s) SBAR, Kardex, ED Summary and MAR was reviewed with the receiving nurse. Lines:   Peripheral IV 07/29/19 Left Antecubital (Active)        Opportunity for questions and clarification was provided.       Patient transported with:  Pt delisa

## 2019-07-30 ENCOUNTER — APPOINTMENT (OUTPATIENT)
Dept: NON INVASIVE DIAGNOSTICS | Age: 74
DRG: 195 | End: 2019-07-30
Attending: NURSE PRACTITIONER
Payer: MEDICARE

## 2019-07-30 VITALS
SYSTOLIC BLOOD PRESSURE: 117 MMHG | HEIGHT: 67 IN | RESPIRATION RATE: 18 BRPM | OXYGEN SATURATION: 97 % | BODY MASS INDEX: 32.33 KG/M2 | HEART RATE: 79 BPM | DIASTOLIC BLOOD PRESSURE: 65 MMHG | TEMPERATURE: 98.5 F | WEIGHT: 206 LBS

## 2019-07-30 LAB
ANION GAP SERPL CALC-SCNC: 7 MMOL/L (ref 3–18)
BASOPHILS # BLD: 0 K/UL (ref 0–0.1)
BASOPHILS NFR BLD: 0 % (ref 0–2)
BUN SERPL-MCNC: 25 MG/DL (ref 7–18)
BUN/CREAT SERPL: 29 (ref 12–20)
CALCIUM SERPL-MCNC: 8.9 MG/DL (ref 8.5–10.1)
CHLORIDE SERPL-SCNC: 110 MMOL/L (ref 100–111)
CK MB CFR SERPL CALC: 2.4 % (ref 0–4)
CK MB SERPL-MCNC: 1.2 NG/ML (ref 5–25)
CK SERPL-CCNC: 49 U/L (ref 39–308)
CO2 SERPL-SCNC: 25 MMOL/L (ref 21–32)
CREAT SERPL-MCNC: 0.87 MG/DL (ref 0.6–1.3)
DIFFERENTIAL METHOD BLD: ABNORMAL
ECHO AO ROOT DIAM: 3.16 CM
ECHO LV INTERNAL DIMENSION DIASTOLIC: 4.08 CM (ref 4.2–5.9)
ECHO LV INTERNAL DIMENSION SYSTOLIC: 2.52 CM
ECHO LV IVSD: 0.89 CM (ref 0.6–1)
ECHO LV MASS 2D: 140.6 G (ref 88–224)
ECHO LV MASS INDEX 2D: 68.7 G/M2 (ref 49–115)
ECHO LV POSTERIOR WALL DIASTOLIC: 1.03 CM (ref 0.6–1)
ECHO LVOT DIAM: 1.94 CM
ECHO LVOT PEAK GRADIENT: 2.9 MMHG
ECHO LVOT PEAK VELOCITY: 84.48 CM/S
ECHO LVOT VTI: 16.68 CM
ECHO MV A VELOCITY: 62.73 CM/S
ECHO MV E DECELERATION TIME (DT): 149.4 MS
ECHO MV E VELOCITY: 68.39 CM/S
ECHO MV E/A RATIO: 1.09
ECHO TV REGURGITANT MAX VELOCITY: 248.86 CM/S
ECHO TV REGURGITANT PEAK GRADIENT: 24.8 MMHG
EOSINOPHIL # BLD: 0.3 K/UL (ref 0–0.4)
EOSINOPHIL NFR BLD: 3 % (ref 0–5)
ERYTHROCYTE [DISTWIDTH] IN BLOOD BY AUTOMATED COUNT: 14.7 % (ref 11.6–14.5)
GLUCOSE BLD STRIP.AUTO-MCNC: 112 MG/DL (ref 70–110)
GLUCOSE BLD STRIP.AUTO-MCNC: 243 MG/DL (ref 70–110)
GLUCOSE SERPL-MCNC: 126 MG/DL (ref 74–99)
HCT VFR BLD AUTO: 38.5 % (ref 36–48)
HGB BLD-MCNC: 12.7 G/DL (ref 13–16)
LYMPHOCYTES # BLD: 2.3 K/UL (ref 0.9–3.6)
LYMPHOCYTES NFR BLD: 24 % (ref 21–52)
MCH RBC QN AUTO: 29.7 PG (ref 24–34)
MCHC RBC AUTO-ENTMCNC: 33 G/DL (ref 31–37)
MCV RBC AUTO: 90.2 FL (ref 74–97)
MONOCYTES # BLD: 0.6 K/UL (ref 0.05–1.2)
MONOCYTES NFR BLD: 6 % (ref 3–10)
NEUTS SEG # BLD: 6.4 K/UL (ref 1.8–8)
NEUTS SEG NFR BLD: 67 % (ref 40–73)
PLATELET # BLD AUTO: 252 K/UL (ref 135–420)
PMV BLD AUTO: 8.3 FL (ref 9.2–11.8)
POTASSIUM SERPL-SCNC: 3.7 MMOL/L (ref 3.5–5.5)
RBC # BLD AUTO: 4.27 M/UL (ref 4.7–5.5)
SODIUM SERPL-SCNC: 142 MMOL/L (ref 136–145)
TROPONIN I SERPL-MCNC: <0.02 NG/ML (ref 0–0.04)
WBC # BLD AUTO: 9.6 K/UL (ref 4.6–13.2)

## 2019-07-30 PROCEDURE — 92610 EVALUATE SWALLOWING FUNCTION: CPT

## 2019-07-30 PROCEDURE — 97161 PT EVAL LOW COMPLEX 20 MIN: CPT

## 2019-07-30 PROCEDURE — 94761 N-INVAS EAR/PLS OXIMETRY MLT: CPT

## 2019-07-30 PROCEDURE — 82962 GLUCOSE BLOOD TEST: CPT

## 2019-07-30 PROCEDURE — 93005 ELECTROCARDIOGRAM TRACING: CPT

## 2019-07-30 PROCEDURE — 36415 COLL VENOUS BLD VENIPUNCTURE: CPT

## 2019-07-30 PROCEDURE — 74011000250 HC RX REV CODE- 250: Performed by: EMERGENCY MEDICINE

## 2019-07-30 PROCEDURE — 74011250637 HC RX REV CODE- 250/637: Performed by: FAMILY MEDICINE

## 2019-07-30 PROCEDURE — 84484 ASSAY OF TROPONIN QUANT: CPT

## 2019-07-30 PROCEDURE — 85025 COMPLETE CBC W/AUTO DIFF WBC: CPT

## 2019-07-30 PROCEDURE — 74011250636 HC RX REV CODE- 250/636: Performed by: EMERGENCY MEDICINE

## 2019-07-30 PROCEDURE — 80048 BASIC METABOLIC PNL TOTAL CA: CPT

## 2019-07-30 PROCEDURE — 94640 AIRWAY INHALATION TREATMENT: CPT

## 2019-07-30 PROCEDURE — 77030027138 HC INCENT SPIROMETER -A

## 2019-07-30 PROCEDURE — 74011636637 HC RX REV CODE- 636/637: Performed by: FAMILY MEDICINE

## 2019-07-30 PROCEDURE — 74011250636 HC RX REV CODE- 250/636: Performed by: FAMILY MEDICINE

## 2019-07-30 PROCEDURE — 74011250637 HC RX REV CODE- 250/637: Performed by: NURSE PRACTITIONER

## 2019-07-30 PROCEDURE — C8929 TTE W OR WO FOL WCON,DOPPLER: HCPCS

## 2019-07-30 RX ORDER — LEVOFLOXACIN 750 MG/1
750 TABLET ORAL EVERY 24 HOURS
Status: DISCONTINUED | OUTPATIENT
Start: 2019-07-30 | End: 2019-07-30 | Stop reason: HOSPADM

## 2019-07-30 RX ORDER — UREA 10 %
2 LOTION (ML) TOPICAL 2 TIMES DAILY
Status: DISCONTINUED | OUTPATIENT
Start: 2019-07-30 | End: 2019-07-30 | Stop reason: HOSPADM

## 2019-07-30 RX ORDER — INSULIN GLARGINE 100 [IU]/ML
38 INJECTION, SOLUTION SUBCUTANEOUS
COMMUNITY

## 2019-07-30 RX ORDER — IPRATROPIUM BROMIDE AND ALBUTEROL SULFATE 2.5; .5 MG/3ML; MG/3ML
3 SOLUTION RESPIRATORY (INHALATION)
Status: DISCONTINUED | OUTPATIENT
Start: 2019-07-30 | End: 2019-07-30 | Stop reason: HOSPADM

## 2019-07-30 RX ORDER — GUAIFENESIN 100 MG/5ML
81 LIQUID (ML) ORAL DAILY
Qty: 30 TAB | Refills: 0 | Status: SHIPPED | OUTPATIENT
Start: 2019-07-30

## 2019-07-30 RX ORDER — LEVOFLOXACIN 750 MG/1
750 TABLET ORAL DAILY
Qty: 6 TAB | Refills: 0 | Status: SHIPPED | OUTPATIENT
Start: 2019-07-30 | End: 2019-08-05

## 2019-07-30 RX ORDER — GUAIFENESIN 100 MG/5ML
81 LIQUID (ML) ORAL DAILY
Status: DISCONTINUED | OUTPATIENT
Start: 2019-07-30 | End: 2019-07-30 | Stop reason: HOSPADM

## 2019-07-30 RX ORDER — INSULIN LISPRO 100 [IU]/ML
8 INJECTION, SOLUTION INTRAVENOUS; SUBCUTANEOUS 3 TIMES DAILY
COMMUNITY
End: 2021-06-12

## 2019-07-30 RX ORDER — UREA 10 %
2 LOTION (ML) TOPICAL 2 TIMES DAILY
Qty: 40 TAB | Refills: 0 | Status: SHIPPED | OUTPATIENT
Start: 2019-07-30 | End: 2019-08-09

## 2019-07-30 RX ADMIN — INSULIN LISPRO 4 UNITS: 100 INJECTION, SOLUTION INTRAVENOUS; SUBCUTANEOUS at 14:13

## 2019-07-30 RX ADMIN — GABAPENTIN 100 MG: 100 CAPSULE ORAL at 08:56

## 2019-07-30 RX ADMIN — Medication 10 ML: at 05:50

## 2019-07-30 RX ADMIN — ASPIRIN 81 MG 81 MG: 81 TABLET ORAL at 17:18

## 2019-07-30 RX ADMIN — HEPARIN SODIUM 5000 UNITS: 5000 INJECTION INTRAVENOUS; SUBCUTANEOUS at 14:13

## 2019-07-30 RX ADMIN — LACTOBACILLUS TAB 2 TABLET: TAB at 17:18

## 2019-07-30 RX ADMIN — GABAPENTIN 100 MG: 100 CAPSULE ORAL at 17:18

## 2019-07-30 RX ADMIN — ACETAMINOPHEN 650 MG: 325 TABLET ORAL at 08:56

## 2019-07-30 RX ADMIN — LEVOFLOXACIN 750 MG: 750 TABLET, FILM COATED ORAL at 14:43

## 2019-07-30 RX ADMIN — FLUOXETINE 10 MG: 10 CAPSULE ORAL at 08:56

## 2019-07-30 RX ADMIN — PERFLUTREN 2 ML: 6.52 INJECTION, SUSPENSION INTRAVENOUS at 14:14

## 2019-07-30 RX ADMIN — HEPARIN SODIUM 5000 UNITS: 5000 INJECTION INTRAVENOUS; SUBCUTANEOUS at 01:48

## 2019-07-30 RX ADMIN — Medication 10 ML: at 17:22

## 2019-07-30 RX ADMIN — IPRATROPIUM BROMIDE AND ALBUTEROL SULFATE 3 ML: .5; 3 SOLUTION RESPIRATORY (INHALATION) at 09:39

## 2019-07-30 NOTE — PHYSICIAN ADVISORY
Letter of admission status determination     Kevin Barnhart   Age: 75 y.o. MRN: [id number]  [facility]:  [id number]    Date of hospitalization: 7/29/2019    I have reviewed this case as it involves a Medicare patient admitted as inpatient that has not been hospitalized for at least two midnights and has a discharge order placed. Patient's condition and the documented plan of care at the time of admission do not fully support the expectation that the patient would require medically necessary hospital care for two or more midnights. Therefore, I recommend changing the hospitalization status to Outpatient. The final decision regarding the patient's hospitalization status depends on the attending physician's judgment.       Creston Sicard, MD, KANDY, [address] [facility], [address] [place] [address]  [phone]

## 2019-07-30 NOTE — H&P
[facility]  HISTORY AND PHYSICAL    Name:  Lora Fournier  MR#:   [id number]  :  1945  ACCOUNT #:  [de-identified]  ADMIT DATE:  2019      CHIEF COMPLAINT:  Chest pain and cough. HISTORY OF PRESENT ILLNESS:  The patient is a 79-year-old gentleman with a past medical history significant for type 2 diabetes mellitus, depression, alcohol abuse, PTSD, who presented to the emergency department complaining of left-sided chest pain beginning this morning associated with a cough. He states that he has been coughing for the past couple of days. He has had subjective fevers and shaking chills over the past couple of days. His cough is described as productive of some whitish to yellow sputum. Chest pain described as left sided, rated about 7/10 at its worst, for which he took some nitroglycerin. Chest pain resolved to zero. He states that when he does take a deep breath, it seems to replicate his pain complaints. He was seen in the emergency department and started on some IV antibiotics consisting of Rocephin and Zithromax. He had an x-ray performed, which by reports demonstrates lower lung zone hazy streaky density suggestive of bilateral pneumonia versus atelectatic changes versus aspiration. Denies any coughing or choking, any difficulty swallowing, and though the ED note reports that history of some alcohol abuse, he denies this. He is here with family member at bedside, he states that he might have a drink every two or three weeks. He has no other complaints at the current time, feels a bit better compared to when he first came in and is resting comfortably. REVIEW OF SYSTEMS:  As above. Denies any headaches or visual changes, dizziness or lightheadedness, any fevers or chills, nausea, vomiting, chest pain apart from that described above.   No wheezing, abdominal pain, dysuria, hematuria, polyuria, oliguria, constipation, diarrhea, melena, hematochezia, rash, bruise, bleed, or hot or cold intolerance. Review otherwise negative for 10-element reviewed. ALLERGIES:  IODINE AND SHELL FISH. MEDICATIONS:  1. Metformin 1000 mg p.o. daily. 2.  NovoLog 70/30 subcutaneously per sliding scale. 3.  Trazodone 50 mg nightly. 4.  Gabapentin 100 mg t.i.d.  5.  Prozac 10 mg daily. PAST MEDICAL HISTORY:  1.  Diabetes mellitus type 2.  2.  Depression and PTSD. PAST SURGICAL HISTORY:  Denies any interval surgical history. FAMILY HISTORY:  Denies. SOCIAL HISTORY:  He has been smoking since he was a child, states he smokes about five cigarettes a day. He has not smoked over the past several days. Alcohol use consist of one to two drinks every two or three weeks. Denies any other drug use. Wishes to be full code. PHYSICAL EXAMINATION:  GENERAL:  On exam, the patient is a well-developed, well-nourished gentleman who is awake, alert, and oriented, in no distress. Wife present at bedside. VITAL SIGNS:  Last set of vitals includes a temperature of 98.5, pulse 76, pressure of 103/62, respiratory rate 16, satting at 100% on room air. HEENT:  The head is normocephalic and atraumatic. Pupils are equally round and reactive to light. Extraocular motions are intact. Nares are patent on both sides. Posterior oropharynx is clear. Mucous membranes are moist.  Tongue is midline. NECK:  Supple. No lymphadenopathy. CARDIOVASCULAR:  Regular rate and rhythm. No murmurs, rubs, or gallops. PULMONARY:  Clear to auscultation bilaterally with decreased breath sounds and some mild rales in his left lower lung field. No wheeze. ABDOMEN:  Soft, nontender, nondistended with normal bowel sounds. EXTREMITIES:  5/5 motor strength x4. No calf tenderness. No edema. 2+ bilateral pedal pulses. NEUROLOGIC:  Cranial nerves II through XII are grossly intact.     LABORATORY DATA:  Labs include a metabolic panel with a sodium 141, potassium 4.1, chloride 108, CO2 of 26, BUN 26, creatinine 1.04, glucose 132, calcium 9.4.    CBC with a white count of 9.5, H and H of 14.1/42.2, platelet 288.  Differential, 68 segs, 20 lymphs, 7 monos, 4 eosinophils.    Urinalysis showed yellow clear urine, specific gravity 1.008, pH of 5.0.  Negative for leukocyte esterase and nitrites.    No cardiac biomarkers have been drawn and sent.  I have ordered a stat set.    Blood cultures have been drawn and sent.    Chest x-ray reviewed, as per above.    EKG on my read shows normal sinus rhythm, rate at 75, no ST or T wave changes suggestive of acute infarct or ischemia.  When compared to prior on 08/03/2014, I do not see any significant changes.    IMPRESSION AND PLAN:  A 74-year-old gentleman here with history of chest pain and cough as well as reported history of some fevers and chills.  It appears that he does have some community-acquired pneumonia.  Pain seems to be replicated with deep breath, but given his age and other risk factors, we will proceed with chest pain workup.  Plan is as follows:  1.  Community-acquired pneumonia:  Maintain him on IV antibiotics consisting of Rocephin and Zithromax, with clinical improvement, we will transition him over to p.o. antibiotics.  2.  Chest pain:  Likely related to community-acquired pneumonia.  However, given his age and risk factors, we will go ahead and work him up.  We will check cardiac biomarkers.  Maintain him on aspirin, hold off on beta-blocker therapy for low normal blood pressures.  A repeat EKG in the morning.  He has no chest pain complaints at the current time.  3.  Diabetes mellitus type 2.  Maintain him on sliding scale insulin regimen.  Check hemoglobin A1c.  4.  He has a history of depression and psychiatric disorder.  We will continue his outpatient medications.  5.  Deep venous thrombosis prophylaxis in the form of heparin 5000 units subcutaneously q. 8 hours.      EDIL NATHAN MD      [id number]  D:  07/29/2019 15:00  T:  07/29/2019 20:08  JOB #:   [id number]  CC:  Dayanna Ziegler

## 2019-07-30 NOTE — PROGRESS NOTES
PHYSICAL THERAPY EVALUATION AND DISCHARGE    Patient: Charlie Valdez (43 y.o. male)  Date: 7/30/2019  Primary Diagnosis: CAP (community acquired pneumonia) [J18.9]        Precautions: None    ASSESSMENT :  Patient is 76yo M admitted to hospital for CAP and presents today alert and agreeable to therapy. Patient transferred to sitting EOB for objective assessment and reports he has been ambulating in room with no SOB or chest tightness. Patient stood to ambulated 45ft in room no AD at [facility] and educated on energy conservation and activity pacing which patient reports he uses at home. Educated on OP pulm rehab and patient reports he has participated in the past. Patient is functioning at his baseline and does not require further skilled intervention at this level of care. Patient agreeable to D/C from PT at this time. PLAN :  Recommendations and Planned Interventions:   No formal PT needs identified at this time. Discharge Recommendations: Outpatient Pulmonary Rehab  Further Equipment Recommendations for Discharge: N/A     SUBJECTIVE:   Patient stated I'm doing just fine.     OBJECTIVE DATA SUMMARY:     Past Medical History:   Diagnosis Date    Arthritis     Depression     Diabetes ([facility] [address]     ETOH abuse     Psychiatric disorder     PTSD   No past surgical history on file. Barriers to Learning/Limitations: None  Compensate with: N/A  Home Situation:   Home Situation  Home Environment: Private residence  # Steps to Enter: 1  One/Two Story Residence: One story  Living Alone: No  Support Systems: Spouse/Significant Other/Partner  Patient Expects to be Discharged to[de-identified] Private residence  Current DME Used/Available at Home: Wheelchair, power  Critical Behavior:  Neurologic State: Alert  Orientation Level: Oriented X4  Cognition: Follows commands   Strength:    Strength:  Within functional limits(BLE)   Tone & Sensation:   Tone: Normal(BLE)   Sensation: Intact(BLE)    Range Of Motion:  AROM: Within functional limits(BLE)    Functional Mobility:  Bed Mobility:   Supine to Sit: Modified independent  Sit to Supine: Modified independent  Scooting: Modified independent  Transfers:  Sit to Stand: Modified independent  Stand to Sit: Modified independent  Balance:   Sitting: Intact  Standing: Intact  Ambulation/Gait Training:  Distance (ft): 45 Feet (ft)   Interventions: Verbal cues; Visual/Demos    Pain:  Pain level pre-treatment: 0/10   Pain level post-treatment: 0/10  Activity Tolerance:   Patient demos good tolerance to therapy with no dizziness, chest pain, or SOB. Please refer to the flowsheet for vital signs taken during this treatment. After treatment:   ?         Patient left in no apparent distress sitting up in chair  ? Patient left in no apparent distress in bed  ? Call bell left within reach  ? Nursing notified  ? Caregiver present  ? Bed alarm activated  ? SCDs applied    COMMUNICATION/EDUCATION:   ?         Role of Physical Therapy in the acute care setting. ?         Fall prevention education was provided and the patient/caregiver indicated understanding. ? Patient/family have participated as able in goal setting and plan of care. ?         Patient/family agree to work toward stated goals and plan of care. ?         Patient understands intent and goals of therapy, but is neutral about his/her participation. ? Patient is unable to participate in goal setting/plan of care: ongoing with therapy staff. ?         Other:     Thank you for this referral.  Salo Jimenez, PT   Time Calculation: 10 mins      Eval Complexity: History: LOW Complexity : Zero comorbidities / personal factors that will impact the outcome / POCExam:LOW Complexity : 1-2 Standardized tests and measures addressing body structure, function, activity limitation and / or participation in recreation  Presentation: LOW Complexity : Stable, uncomplicated  Clinical Decision Making:Low Complexity    Overall Complexity:LOW

## 2019-07-30 NOTE — DISCHARGE SUMMARY
[facility]  Discharge Summary       Patient: Johanne Frederick Age: 75 y.o. : 1945 MR#: [id number] SSN: [id number]  PCP on record: OtherAmanda MD  Admit date: 2019  Discharge date: 2019    Disposition:    []Home   [x]Home with Home Health   []SNF/NH   []Rehab   []Home with family   []Alternate Facility:____________________    Admission Diagnoses:  CAP (community acquired pneumonia) [J18.9]    Discharge Diagnoses:                             1.  Community-acquired pneumonia   2. CAD w/ chest pain prior to admission and CABG x 4 in     3. Diabetes mellitus type 2 with hyperglycemia  4. He has a history of depression and PTSD    Discharge Medications:     Current Discharge Medication List      START taking these medications    Details   Lactobacillus Acidoph & Bulgar (FLORANEX) 1 million cell tab tablet Take 2 Tabs by mouth two (2) times a day for 10 days. Qty: 40 Tab, Refills: 0      levoFLOXacin (LEVAQUIN) 750 mg tablet Take 1 Tab by mouth daily for 6 days. Qty: 6 Tab, Refills: 0      aspirin 81 mg chewable tablet Take 1 Tab by mouth daily. Qty: 30 Tab, Refills: 0         CONTINUE these medications which have NOT CHANGED    Details   insulin glargine (LANTUS,BASAGLAR) 100 unit/mL (3 mL) inpn 38 Units by SubCUTAneous route nightly. insulin lispro (HUMALOG U-100 INSULIN) 100 unit/mL injection 8 Units by SubCUTAneous route three (3) times daily. traZODone (DESYREL) 50 mg tablet Take 50 mg by mouth nightly.      gabapentin (NEURONTIN) 100 mg capsule Take 100 mg by mouth three (3) times daily. FLUoxetine (PROZAC) 10 mg capsule Take 10 mg by mouth daily.          STOP taking these medications       metFORMIN (GLUCOPHAGE) 1,000 mg tablet Comments:   Reason for Stopping:         insulin aspart protamine/insulin aspart (NOVOLOG MIX 70/30) 100 unit/mL (70-30) flex pen Comments:   Reason for Stopping:             Consults:    - cardiology Procedures:  -   None    Significant Diagnostic Studies:   -  Xr Chest Port    Result Date: 2019  IMPRESSION: Lower lung zone hazy streaky densities suggestive of bilateral pneumonia vs. atelectatic changes vs. aspiration. Hospital Course by Problem   1. Community-acquired pneumonia - started on azithromycin and rocephin during admission and transitioned to levaquin at time of discharge. Speech therapy saw over concern for aspiration and regular diet recommended. Patient remains with stable vital signs with no hypoxia and afebrile throughout admission. 2.  CAD w/ chest pain prior to admission and CABG x 4 in 2016 - cardiology following and ruled out for MI, EKG without ischemic changes, troponins neg at <0.02 x 3 during admission. Start asa per cardiology recommendations; cardiology advised for start of statin however no lipid panel available will request close outpatient follow up for further management. 3.  Diabetes mellitus type 2 with hyperglycemia - A1c 7.2 upon admission. Treated with SSI during admission. Advised continue current regimen of lantus and humalog at time of discharge. 4.  He has a history of depression and PTSD - cont home psych regimen.     Today's examination of the patient revealed:     Subjective:   No SOB, cough, chest pain, n/v/constipation   Objective:   VS:   Visit Vitals  /72   Pulse 78   Temp 98.5 °F (36.9 °C)   Resp 18   Ht 5' 7\" (1.702 m)   Wt 93.4 kg (206 lb)   SpO2 97%   BMI 32.26 kg/m²      Tmax/24hrs: Temp (24hrs), Av °F (36.7 °C), Min:97.4 °F (36.3 °C), Max:98.5 °F (36.9 °C)     Input/Output:     Intake/Output Summary (Last 24 hours) at 2019 1558  Last data filed at 2019 0953  Gross per 24 hour   Intake 480 ml   Output 2300 ml   Net -1820 ml     General:  Alert, NAD  Cardiovascular:  RRR  Pulmonary:  LSC throughout with some reduced breath sounds to LLL; respiratory effort WNL  GI:  +BS in all four quadrants, soft, non-tender  Extremities:  No edema; 2+ dorsalis pedis pulses bilaterally  Neuro: alert and oriented x 3    Labs:    Recent Results (from the past 24 hour(s))   LACTIC ACID    Collection Time: 07/29/19  5:08 PM   Result Value Ref Range    Lactic acid 1.6 0.4 - 2.0 MMOL/L   CARDIAC PANEL,(CK, CKMB & TROPONIN)    Collection Time: 07/29/19  8:50 PM   Result Value Ref Range    CK 52 39 - 308 U/L    CK - MB 1.5 <3.6 ng/ml    CK-MB Index 2.9 0.0 - 4.0 %    Troponin-I, QT <0.02 0.0 - 0.045 NG/ML   GLUCOSE, POC    Collection Time: 07/29/19 10:07 PM   Result Value Ref Range    Glucose (POC) 146 (H) 70 - 110 mg/dL   CBC WITH AUTOMATED DIFF    Collection Time: 07/30/19  3:05 AM   Result Value Ref Range    WBC 9.6 4.6 - 13.2 K/uL    RBC 4.27 (L) 4.70 - 5.50 M/uL    HGB 12.7 (L) 13.0 - 16.0 g/dL    HCT 38.5 36.0 - 48.0 %    MCV 90.2 74.0 - 97.0 FL    MCH 29.7 24.0 - 34.0 PG    MCHC 33.0 31.0 - 37.0 g/dL    RDW 14.7 (H) 11.6 - 14.5 %    PLATELET 252 135 - 420 K/uL    MPV 8.3 (L) 9.2 - 11.8 FL    NEUTROPHILS 67 40 - 73 %    LYMPHOCYTES 24 21 - 52 %    MONOCYTES 6 3 - 10 %    EOSINOPHILS 3 0 - 5 %    BASOPHILS 0 0 - 2 %    ABS. NEUTROPHILS 6.4 1.8 - 8.0 K/UL    ABS. LYMPHOCYTES 2.3 0.9 - 3.6 K/UL    ABS. MONOCYTES 0.6 0.05 - 1.2 K/UL    ABS. EOSINOPHILS 0.3 0.0 - 0.4 K/UL    ABS. BASOPHILS 0.0 0.0 - 0.1 K/UL    DF AUTOMATED     METABOLIC PANEL, BASIC    Collection Time: 07/30/19  3:05 AM   Result Value Ref Range    Sodium 142 136 - 145 mmol/L    Potassium 3.7 3.5 - 5.5 mmol/L    Chloride 110 100 - 111 mmol/L    CO2 25 21 - 32 mmol/L    Anion gap 7 3.0 - 18 mmol/L    Glucose 126 (H) 74 - 99 mg/dL    BUN 25 (H) 7.0 - 18 MG/DL    Creatinine 0.87 0.6 - 1.3 MG/DL    BUN/Creatinine ratio 29 (H) 12 - 20      GFR est AA >60 >60 ml/min/1.73m2    GFR est non-AA >60 >60 ml/min/1.73m2    Calcium 8.9 8.5 - 10.1 MG/DL   CARDIAC PANEL,(CK, CKMB & TROPONIN)    Collection Time: 07/30/19  3:05 AM   Result Value Ref Range    CK 49 39 - 308 U/L    CK -  MB 1.2 <3.6 ng/ml    CK-MB Index 2.4 0.0 - 4.0 %    Troponin-I, QT <0.02 0.0 - 0.045 NG/ML   GLUCOSE, POC    Collection Time: 07/30/19  1:23 PM   Result Value Ref Range    Glucose (POC) 243 (H) 70 - 110 mg/dL   EKG, 12 LEAD, SUBSEQUENT    Collection Time: 07/30/19  2:11 PM   Result Value Ref Range    Ventricular Rate 76 BPM    Atrial Rate 76 BPM    P-R Interval 200 ms    QRS Duration 76 ms    Q-T Interval 400 ms    QTC Calculation (Bezet) 450 ms    Calculated P Axis 51 degrees    Calculated R Axis -5 degrees    Calculated T Axis 47 degrees    Diagnosis       Normal sinus rhythm  Inferior infarct (cited on or before 29-JUL-2019)  Anteroseptal infarct (cited on or before 29-JUL-2019)  Abnormal ECG  When compared with ECG of 29-JUL-2019 10:47,  No significant change was found     ECHO ADULT COMPLETE    Collection Time: 07/30/19  2:13 PM   Result Value Ref Range    Ao Root D 3.16 cm    LVIDd 4.08 (A) 4.2 - 5.9 cm    LVPWd 1.03 (A) 0.6 - 1.0 cm    LVIDs 2.52 cm    IVSd 0.89 0.6 - 1.0 cm    LVOT d 1.94 cm    LVOT Peak Velocity 84.48 cm/s    LVOT Peak Gradient 2.9 mmHg    LVOT VTI 16.68 cm    MV A Lupillo 62.73 cm/s    MV E Lupillo 68.39 cm/s    MV E/A 1.09     LV Mass .6 88 - 224 g    LV Mass AL Index 68.7 49 - 115 g/m2    Mitral Valve E Wave Deceleration Time 149.4 ms    Triscuspid Valve Regurgitation Peak Gradient 24.8 mmHg    TR Max Velocity 248.86 cm/s     Additional Data Reviewed:     Condition: Stable  Follow-up Appointments:   PCP in 5-7 days   Dr. Hermes Lentz in 2 weeks   Dr. Mat Lieberman in 4 weeks    >30 minutes spent coordinating this discharge (review instructions/follow-up, prescriptions, preparing report for sign off)    Signed:  Matt Johnson NP  7/30/2019  3:54 PM

## 2019-07-30 NOTE — PROGRESS NOTES
Problem: Falls - Risk of  Goal: *Absence of Falls  Description  Document Emilia Madrid Fall Risk and appropriate interventions in the flowsheet. Outcome: Progressing Towards Goal  Note:   Fall Risk Interventions:  Mobility Interventions: Bed/chair exit alarm, Communicate number of staff needed for ambulation/transfer, Patient to call before getting OOB, Utilize walker, cane, or other assistive device         Medication Interventions: Bed/chair exit alarm, Patient to call before getting OOB, Teach patient to arise slowly         History of Falls Interventions: Bed/chair exit alarm, Door open when patient unattended, Consult care management for discharge planning, Investigate reason for fall         Problem: Diabetes Self-Management  Goal: *Disease process and treatment process  Description  Define diabetes and identify own type of diabetes; list 3 options for treating diabetes. Outcome: Progressing Towards Goal  Goal: *Incorporating physical activity into lifestyle  Description  State effect of exercise on blood glucose levels. Outcome: Progressing Towards Goal  Goal: *Using medications safely  Description  State effect of diabetes medications on diabetes; name diabetes medication taking, action and side effects. Outcome: Progressing Towards Goal  Goal: *Monitoring blood glucose, interpreting and using results  Description  Identify recommended blood glucose targets  and personal targets.   Outcome: Progressing Towards Goal     Problem: General Medical Care Plan  Goal: *Vital signs within specified parameters  Outcome: Progressing Towards Goal  Goal: *Labs within defined limits  Outcome: Progressing Towards Goal  Goal: *Absence of infection signs and symptoms  Outcome: Progressing Towards Goal  Goal: *Optimal pain control at patient's stated goal  Outcome: Progressing Towards Goal  Goal: *Skin integrity maintained  Outcome: Progressing Towards Goal  Goal: *Fluid volume balance  Outcome: Progressing Towards Goal  Goal: *Anxiety reduced or absent  Outcome: Progressing Towards Goal

## 2019-07-30 NOTE — ROUTINE PROCESS
Bedside and Verbal shift change report given to MOSES Ye (oncoming nurse) by Roman li. Report included the following information SBAR, Kardex, MAR, Recent Results and Cardiac Rhythm Sinus rhythm .

## 2019-07-30 NOTE — CONSULTS
[facility] - Consult Note    Consultation request by Peg Fiore MD for advice/opinion related to evaluating CAP (community acquired pneumonia) [J18.9]    Date of  Admission: 7/29/2019 10:19 AM   Primary Care Physician:  Amanda Dobbs MD     Assessment:     Patient Active Problem List   Diagnosis Code    CAP (community acquired pneumonia) J18.9       -Pneumonia with concerns of possible aspiration, presented with productive cough and CP. CXR with bilateral patchy infiltrates, denies SOB. Productive cough improving with antibiotics.  -Chest pain, in setting of pneumonia, ruled out for MI, ECG without ischemic changes. Reports normal stress and possibly echocardiogram within the last 6 months. -CAD s/p CABG X4 2016, specifics unavailable but reports cardiac arrest during thrombectomy ? leading to heart cath and CABG. -Poor functional status, uses scooter.  -Diabetes mellitus. -Depression, PTSD. -Tobacco abuse.  -Iodine allergy. [facility] cardiologist at [facility]. Plan: Will review echocardiogram and follow up ECG once complete. If unremarkable, Ok to discharge from cardiac standpoint with follow up with primary cardiologist.  Recommend ASA, statin. No BB given low BP. History of Present Illness: This is a 75 y.o. male admitted for CAP (community acquired pneumonia) [J18.9]. Patient complains of:  CP, Cough. Patient is somewhat difficult historian. Patient had recent productive cough over the previous week. Patient denies SOB, orthopnea, PND, ABHI. Patient previously reported fevers and chills but now denies. Patient reports he had a fever in his right foot. Patient reports he had CP yesterday st rest. He took SLN and then felt dizzy. Patient reports he occasionally has twinges of CP since his CABG but short lived. This pain was different. EMS was called. Patient was admitted with PNA, possible aspiration. Patient feels cough and sputum production improving with abx.  Patient is CP free and it is not reproducible on exam.    Cardiac risk factors: smoking/ tobacco exposure, diabetes mellitus, sedentary life style, male gender      Review of Symptoms:    Constitutional: positive for fevers and chills  Eyes: negative for visual disturbance  Ears, nose, mouth, throat, and face: negative for nasal congestion  Respiratory: positive for cough  Cardiovascular: positive for chest pain, negative for palpitations, syncope, orthopnea, lower extremity edema  Gastrointestinal: negative for vomiting and diarrhea  Genitourinary:negative for dysuria  Hematologic/lymphatic: negative for bleeding  Musculoskeletal:negative for muscle weakness  Neurological: negative for dizziness     Past Medical History:     Past Medical History:   Diagnosis Date    Arthritis     Depression     Diabetes ([facility] [address]     ETOH abuse     Psychiatric disorder     PTSD         Social History:     Social History     Socioeconomic History    Marital status:      Spouse name: Not on file    Number of children: Not on file    Years of education: Not on file    Highest education level: Not on file   Tobacco Use    Smoking status: Current Every Day Smoker     Packs/day: 1.00   Substance and Sexual Activity    Alcohol use: Yes        Family History:   No family history on file. Medications:      Allergies   Allergen Reactions    Iodine Unknown (comments)    Shellfish Derived Unknown (comments)        Current Facility-Administered Medications   Medication Dose Route Frequency    albuterol-ipratropium (DUO-NEB) 2.5 MG-0.5 MG/3 ML  3 mL Nebulization Q4H PRN    cefTRIAXone (ROCEPHIN) 2 g in sterile water (preservative free) 20 mL IV syringe  2 g IntraVENous Q24H    azithromycin (ZITHROMAX) 500 mg in  mL  500 mg IntraVENous Q24H    heparin (porcine) injection 5,000 Units  5,000 Units SubCUTAneous Q8H    sodium chloride (NS) flush 5-40 mL  5-40 mL IntraVENous Q8H    sodium chloride (NS) flush 5-40 mL  5-40 mL IntraVENous PRN    acetaminophen (TYLENOL) tablet 650 mg  650 mg Oral Q4H PRN    FLUoxetine (PROzac) capsule 10 mg  10 mg Oral DAILY    gabapentin (NEURONTIN) capsule 100 mg  100 mg Oral TID    traZODone (DESYREL) tablet 50 mg  50 mg Oral QHS    insulin lispro (HUMALOG) injection   SubCUTAneous AC&HS    glucose chewable tablet 16 g  4 Tab Oral PRN    glucagon (GLUCAGEN) injection 1 mg  1 mg IntraMUSCular PRN    dextrose 10% infusion 125-250 mL  125-250 mL IntraVENous PRN         Physical Exam:     Visit Vitals  /72 (BP 1 Location: Left arm, BP Patient Position: At rest)   Pulse 80   Temp 98.3 °F (36.8 °C)   Resp 18   Ht 5' 7\" (1.702 m)   Wt 93.6 kg (206 lb 6.4 oz)   SpO2 99%   BMI 32.33 kg/m²     BP Readings from Last 3 Encounters:   07/30/19 114/72   08/03/14 128/63   08/03/14 120/75     Pulse Readings from Last 3 Encounters:   07/30/19 80   08/03/14 (!) 103   08/03/14 83     Wt Readings from Last 3 Encounters:   07/29/19 93.6 kg (206 lb 6.4 oz)   07/16/18 92.5 kg (204 lb)   07/13/18 92.5 kg (204 lb)       General:  alert, cooperative, no distress, appears stated age  Neck:  no JVD  Lungs:  rales few bibasilar  Heart:  regular rate and rhythm  Abdomen:  abdomen is soft without significant tenderness, masses, organomegaly or guarding  Extremities:  extremities normal, atraumatic, no cyanosis or edema  Skin: Warm and dry.  no hyperpigmentation, vitiligo, or suspicious lesions  Neuro: alert, oriented x3, affect appropriate  Psych: non focal     Data Review:     Recent Labs     07/30/19  0305 07/29/19  1045   WBC 9.6 9.5   HGB 12.7* 14.1   HCT 38.5 42.2    288     Recent Labs     07/30/19  0305 07/29/19  1045    141   K 3.7 4.1    108   CO2 25 26   * 132*   BUN 25* 26*   CREA 0.87 1.04   CA 8.9 9.4       Results for orders placed or performed during the hospital encounter of 07/29/19   EKG, 12 LEAD, INITIAL   Result Value Ref Range    Ventricular Rate 75 BPM    Atrial Rate 75 BPM P-R Interval 178 ms    QRS Duration 76 ms    Q-T Interval 402 ms    QTC Calculation (Bezet) 448 ms    Calculated P Axis 48 degrees    Calculated R Axis -8 degrees    Calculated T Axis 54 degrees    Diagnosis       Normal sinus rhythm  Inferior infarct , age undetermined  Anteroseptal infarct (cited on or before 29-JUL-2019)  Abnormal ECG  When compared with ECG of 03-AUG-2014 11:54,  Inferior infarct is now present  Questionable change in initial forces of Septal leads  Confirmed by Sita Dial MD, --- ([id number]) on 7/29/2019 1:20:45 PM         All Cardiac Markers in the last 24 hours:    Lab Results   Component Value Date/Time    CPK 49 07/30/2019 03:05 AM    CPK 52 07/29/2019 08:50 PM    CKMB 1.2 07/30/2019 03:05 AM    CKMB 1.5 07/29/2019 08:50 PM    CKND1 2.4 07/30/2019 03:05 AM    CKND1 2.9 07/29/2019 08:50 PM    TROIQ <0.02 07/30/2019 03:05 AM    TROIQ <0.02 07/29/2019 08:50 PM       Last Lipid:  No results found for: CHOL, CHOLX, CHLST, CHOLV, HDL, LDL, LDLC, DLDLP, TGLX, TRIGL, TRIGP, CHHD, CHHDX    Signed By: RY Bustamante     July 30, 2019

## 2019-07-30 NOTE — PROGRESS NOTES
conducted an initial consultation and Spiritual Assessment for Francisco Arredondo, who is a 75 y. o.,male. Patients Primary Language is: [place] Fl[place]. According to the patients EMR Christian Affiliation is: Davee Needs. The reason the Patient came to the hospital is:   Patient Active Problem List    Diagnosis Date Noted    CAP (community acquired pneumonia) 07/29/2019        The  provided the following Interventions:  Initiated a relationship of care and support. Explored issues of lydia, belief, spirituality and Restoration/ritual needs while hospitalized. Listened empathically. Provided chaplaincy education. Provided information about Spiritual Care Services. Offered prayer and assurance of continued prayers on patient's behalf. Chart reviewed. The following outcomes where achieved:  Patient shared limited information about both their medical narrative and spiritual journey/beliefs. Patient processed feeling about current hospitalization. Patient expressed gratitude for 's visit. Assessment:  Patient does not have any Restoration/cultural needs that will affect patients preferences in health care. There are no spiritual or Restoration issues which require intervention at this time. Plan:  Chaplains will continue to follow and will provide pastoral care on an as needed/requested basis.  recommends bedside caregivers page  on duty if patient shows signs of acute spiritual or emotional distress.     [id number] [facility]   [phone]

## 2019-07-30 NOTE — DISCHARGE INSTRUCTIONS
Discharge Instructions    Patient: Kimmie Castañeda MRN: [id number]  CSN: [id number]    YOB: 1945  Age: 75 y.o. Sex: male    DOA: 7/29/2019 LOS:  LOS: 1 day   Discharge Date:      DIET:  Cardiac Diet    ACTIVITY: Activity as tolerated  Home health care for Skilled care for PNA    ADDITIONAL INFORMATION: If you experience any of the following symptoms but not limited to Fever, chills, nausea, vomiting, diarrhea, change in mentation, falling, bleeding, shortness of breath, chest pain, please call your primary care physician or return to the emergency room if you cannot get hold of your doctor:     FOLLOW UP CARE:  PCP in 5-7 days  Dr. Gray Back in 2 weeks   Dr. Oxana Greenberg in 10 days    Autumn Mcfarland NP  7/30/2019 3:51 PM      DISCHARGE SUMMARY from Nurse    PATIENT INSTRUCTIONS:    Patient Education        Pneumonia: Care Instructions  Your Care Instructions    Pneumonia is an infection of the lungs. Most cases are caused by infections from bacteria or viruses. Pneumonia may be mild or very severe. If it is caused by bacteria, you will be treated with antibiotics. It may take a few weeks to a few months to recover fully from pneumonia, depending on how sick you were and whether your overall health is good. Follow-up care is a key part of your treatment and safety. Be sure to make and go to all appointments, and call your doctor if you are having problems. It's also a good idea to know your test results and keep a list of the medicines you take. How can you care for yourself at home? · Take your antibiotics exactly as directed. Do not stop taking the medicine just because you are feeling better. You need to take the full course of antibiotics. · Take your medicines exactly as prescribed. Call your doctor if you think you are having a problem with your medicine. · Get plenty of rest and sleep. You may feel weak and tired for a while, but your energy level will improve with time.   · To prevent dehydration, drink plenty of fluids, enough so that your urine is light yellow or clear like water. Choose water and other caffeine-free clear liquids until you feel better. If you have kidney, heart, or liver disease and have to limit fluids, talk with your doctor before you increase the amount of fluids you drink. · Take care of your cough so you can rest. A cough that brings up mucus from your lungs is common with pneumonia. It is one way your body gets rid of the infection. But if coughing keeps you from resting or causes severe fatigue and chest-wall pain, talk to your doctor. He or she may suggest that you take a medicine to reduce the cough. · Use a vaporizer or humidifier to add moisture to your bedroom. Follow the directions for cleaning the machine. · Do not smoke or allow others to smoke around you. Smoke will make your cough last longer. If you need help quitting, talk to your doctor about stop-smoking programs and medicines. These can increase your chances of quitting for good. · Take an over-the-counter pain medicine, such as acetaminophen (Tylenol), ibuprofen (Advil, Motrin), or naproxen (Aleve). Read and follow all instructions on the label. · Do not take two or more pain medicines at the same time unless the doctor told you to. Many pain medicines have acetaminophen, which is Tylenol. Too much acetaminophen (Tylenol) can be harmful. · If you were given a spirometer to measure how well your lungs are working, use it as instructed. This can help your doctor tell how your recovery is going. · To prevent pneumonia in the future, talk to your doctor about getting a flu vaccine (once a year) and a pneumococcal vaccine (one time only for most people). When should you call for help? Call 911 anytime you think you may need emergency care.  For example, call if:    · You have severe trouble breathing.    Call your doctor now or seek immediate medical care if:    · You cough up dark brown or bloody mucus (sputum).     · You have new or worse trouble breathing.     · You are dizzy or lightheaded, or you feel like you may faint.    Watch closely for changes in your health, and be sure to contact your doctor if:    · You have a new or higher fever.     · You are coughing more deeply or more often.     · You are not getting better after 2 days (48 hours).     · You do not get better as expected. Where can you learn more? Go to http://www.gray.com/. Enter [id number] in the search box to learn more about \"Pneumonia: Care Instructions. \"  Current as of: September 5, 2018  Content Version: 12.1  © 6587-5278 [employer]. Care instructions adapted under license by [employer] (which disclaims liability or warranty for this information). If you have questions about a medical condition or this instruction, always ask your healthcare professional. [address] any warranty or liability for your use of this information. *  Please give a list of your current medications to your Primary Care Provider. *  Please update this list whenever your medications are discontinued, doses are      changed, or new medications (including over-the-counter products) are added. *  Please carry medication information at all times in case of emergency situations. These are general instructions for a healthy lifestyle:    No smoking/ No tobacco products/ Avoid exposure to second hand smoke  Surgeon General's Warning:  Quitting smoking now greatly reduces serious risk to your health.     Obesity, smoking, and sedentary lifestyle greatly increases your risk for illness    A healthy diet, regular physical exercise & weight monitoring are important for maintaining a healthy lifestyle    You may be retaining fluid if you have a history of heart failure or if you experience any of the following symptoms:  Weight gain of 3 pounds or more overnight or 5 pounds in a week, increased swelling in our hands or feet or shortness of breath while lying flat in bed. Please call your doctor as soon as you notice any of these symptoms; do not wait until your next office visit. The discharge information has been reviewed with the patient. The patient verbalized understanding. Discharge medications reviewed with the patient and appropriate educational materials and side effects teaching were provided.   ___________________________________________________________________________________________________________________________________    Patient armband removed and shredded

## 2019-07-30 NOTE — DIABETES MGMT
Diabetes Patient/Family Education Record  Factors That  May Influence Patients Ability  to Learn or  Comply with Recommendations   []   Language barrier    []   Cultural needs   []   Motivation    []   Cognitive limitation    []   Physical   [x]   Education    []   Physiological factors   []   Hearing/vision/speaking impairment   []   Gnosticist beliefs    []   Financial factors   []  Other:   []  No factors identified at this time. Person Instructed:   [x]   Patient   []   Family   []  Other     Preference for Learning:   [x]   Verbal   []   Written   []  Demonstration     Level of Comprehension & Competence:    [x]  Good                                      [] Fair                                     []  Poor                             []  Needs Reinforcement   [x]  Teachback completed    Education Component: Noted plan for disch to home today. Patient stated that he is followed at [facility] in [facility] for management of diabetes. [x]  Medication management, including how to administer insulin (if appropriate) and potential medication interactions: Yes. Patient reported list of home diabetes medications:  Lantus (pen) insulin 38 units daily at bedtime. Humalog (pen) insulin 8 units TID with meals. [x]  Nutritional management -obtain usual meal pattern: Yes. Patient described typical meals (breakfast, lunch, dinner) and serving size of carbs (starches, fruits, dairy) and limiting intake of concentrated sweets. Patient further reported that his wife regularly monitor his food intake. [x]  Exercise: Yes. [x]  Signs, symptoms, and treatment of hyperglycemia and hypoglycemia: Yes. [x] Prevention, recognition and treatment of hyperglycemia and hypoglycemia: Yes. [x]  Importance of blood glucose monitoring and how to obtain a blood glucose meter: Yes.    []  Instruction on use of the blood glucose meter   [x]  Discuss the importance of HbA1C monitoring: Yes.  Patient stated that his prior A1c level was very high but he made a lot of change in the way he eat and it worked. Discussed with patient his current A1c level of 7.2% (7/29/2019) which is equivalent to estimated average blood glucose of 160 mg/dL during the past 2-3 months. Encouraged patient to cont to follow his diabetes treatment plan.    []  Sick day guidelines   [x]  Proper use and disposal of lancets, needles, syringes or insulin pens (if appropriate): Yes. [x]  Potential long-term complications (retinopathy, kidney disease, neuropathy, foot care): Yes. [x] Information about whom to contact in case of emergency or for more information: Yes. [x]  Goal:  Patient/family will demonstrate understanding of Diabetes Self Management Skills by: 8/06/2019  Plan for post-discharge education or self-management support:    [x] Outpatient class schedule provided            [] Patient Declined    [] Scheduled for outpatient classes (date) _______  Verify:  Does patient understand how diabetes medications work? Yes. Does patient know what their most recent A1c is? Yes. Does patient monitor glucose at home? Yes. Does patient have difficulty obtaining diabetes medications and testing supplies? No. Patient stated that he has no problem getting his diabetes meds and testing supplies at the [facility] in [place], [place].        Cali Taylor RN [facility]  Pager: [phone]

## 2019-07-30 NOTE — ROUTINE PROCESS
Bedside shift change report given to Bonita Khoury RN (oncoming nurse) by Elena Mendoza RN (offgoing nurse). Report included the following information SBAR, Kardex, Intake/Output, MAR, Recent Results and Quality Measures.

## 2019-07-30 NOTE — PROGRESS NOTES
Discharge order noted for today. Pt has been accepted to [facility] agency. Met with patient IS agreeable to the transition plan today. Transport has been arranged through [facility]. Patient's discharge summary and home health  orders have been forwarded to [facility] home health  agency via [address] [place] [address] . Updated bedside RN,  to the transition plan.   Discharge information has been documented on the AVS.       [address] [facility]

## 2019-07-30 NOTE — PROGRESS NOTES
Problem: Dysphagia (Adult)  Goal: *Acute Goals and Plan of Care (Insert Text)  Description  Patient will:  1. Tolerate PO trials with 0 s/s overt distress in 4/5 trials  2. Utilize compensatory swallow strategies/maneuvers (decrease bite/sip, size/rate, alt. liq/sol) with min cues in 4/5 trials  3. Complete an objective swallow study (i.e., MBSS) to assess swallow integrity, r/o aspiration, and determine of safest LRD, min A per MD discretion    Rec:     Reg solid with thin liquids  Aspiration precautions  HOB >45 during po intake, remain >30 for 30-45 minutes after po   Small bites/sips; alternate liquid/solid with slow feeding rate   Oral care TID  Meds per pt preference     Outcome: Progressing Towards Goal    SPEECH LANGUAGE PATHOLOGY BEDSIDE SWALLOW EVALUATION    Patient: Praveena Carver (2 y.o. male)  Date: 7/30/2019  Primary Diagnosis: CAP (community acquired pneumonia) [J18.9]        Precautions: aspiration     PLOF: As per H&P    ASSESSMENT :  Based on the objective data described below, the patient presents with oropharyngeal swallow fxn essentially WFL. Strength, ROM, and coordination of the orofacial musculature were all found to be [employer]. Pt with limited natural dentition and reported that he consumes a regular diet with thin liquids at home. The pt presented with adequate oral transit times on all consistencies. Mastication time was appropriate. No s/sx of aspiration noted; hyolaryngeal elevation and excursion appeared adequate on all consistencies. No oral stasis noted post swallow. The pt denied globus sensation post swallow. Pt reported that he occasionally gets \"choked up\" on popcorn, but has no other c/o dysphagia otherwise. CXR reviewed. Rec reg solid diet with thin liquids, aspiration precautions, oral care TID, and meds as tolerated. SLP available for MBS IP/OP,  per MD discretion, if silent aspiration is a concern.  Otherwise, ST will continue to follow x 1-2 visits to ensure safety of diet tolerance. Patient will benefit from skilled intervention to address the above impairments. Patient's rehabilitation potential is considered to be Good  Factors which may influence rehabilitation potential include:   ? None noted     PLAN :  Recommendations and Planned Interventions: See above  Frequency/Duration: Patient will be followed by speech-language pathology x 1-2 more visits to address goals. Discharge Recommendations: OP     SUBJECTIVE:   Patient stated Sometimes I get choked on popcorn, but that's it really and it rarely happens. OBJECTIVE:     Past Medical History:   Diagnosis Date    Arthritis     Depression     Diabetes ([facility] [address]     ETOH abuse     Psychiatric disorder     PTSD   No past surgical history on file. Prior Level of Function/Home Situation: see below  Home Situation  Home Environment: Private residence  # Steps to Enter: 1  One/Two Story Residence: One story  Living Alone: No  Support Systems: Spouse/Significant Other/Partner  Patient Expects to be Discharged to[de-identified] Private residence  Current DME Used/Available at Home: Wheelchair, power    Diet prior to admission: reg solid with thin  Current Diet:  reg solid with thin      Cognitive and Communication Status:  Neurologic State: Alert  Orientation Level: Oriented X4  Cognition: Follows commands  Oral Assessment:  Oral Assessment  Labial: No impairment  Dentition: Natural;Limited  Oral Hygiene: adequate  Lingual: No impairment  Velum: No impairment  Mandible: No impairment  P.O. Trials:  Patient Position: [facility] at [facility]  Vocal quality prior to P.O.: No impairment  Consistency Presented: Thin liquid; Solid  How Presented: Self-fed/presented;Cup/sip;Straw;Successive swallows  Bolus Acceptance: No impairment  Bolus Formation/Control: No impairment  Propulsion: No impairment  Oral Residue: None  Initiation of Swallow: No impairment  Laryngeal Elevation: Functional  Aspiration Signs/Symptoms: None  Pharyngeal Phase Characteristics: No impairment, issues, or problems   Effective Modifications: Small sips and bites; Alternate liquids/solids  Cues for Modifications: Minimal     Oral Phase Severity: No impairment  Pharyngeal Phase Severity : No impairment    PAIN:  Start of Eval: 0  End of Eval: 0     After treatment:   ?            Patient left in no apparent distress sitting up in chair  ? Patient left in no apparent distress in bed  ? Call bell left within reach  ? Nursing notified  ? Family present  ? Caregiver present  ? Bed alarm activated    COMMUNICATION/EDUCATION:   ?            Aspiration precautions; swallow safety; compensatory techniques. ?            Patient/family have participated as able in goal setting and plan of care.     Thank you for this referral.    Mima Ramirez M.S. CCC-SLP/L  Speech-Language Pathologist

## 2019-07-31 ENCOUNTER — HOME HEALTH ADMISSION (OUTPATIENT)
Dept: HOME HEALTH SERVICES | Facility: HOME HEALTH | Age: 74
End: 2019-07-31

## 2019-07-31 LAB
ATRIAL RATE: 76 BPM
CALCULATED P AXIS, ECG09: 51 DEGREES
CALCULATED R AXIS, ECG10: -5 DEGREES
CALCULATED T AXIS, ECG11: 47 DEGREES
DIAGNOSIS, 93000: NORMAL
P-R INTERVAL, ECG05: 200 MS
Q-T INTERVAL, ECG07: 400 MS
QRS DURATION, ECG06: 76 MS
QTC CALCULATION (BEZET), ECG08: 450 MS
VENTRICULAR RATE, ECG03: 76 BPM

## 2019-07-31 NOTE — HOME CARE
Referral placed in que after hours. Patient discharged yesterday. Referral for [address] [facility] processed and called to Jorge L Lamar in intake. I spoke with adeola's wife and verified demographics and PCP.  Azucena Choe LPN

## 2019-08-01 ENCOUNTER — HOME CARE VISIT (OUTPATIENT)
Dept: HOME HEALTH SERVICES | Facility: HOME HEALTH | Age: 74
End: 2019-08-01

## 2019-08-02 ENCOUNTER — HOME CARE VISIT (OUTPATIENT)
Dept: HOME HEALTH SERVICES | Facility: HOME HEALTH | Age: 74
End: 2019-08-02

## 2019-08-04 LAB
BACTERIA SPEC CULT: NORMAL
BACTERIA SPEC CULT: NORMAL
SERVICE CMNT-IMP: NORMAL
SERVICE CMNT-IMP: NORMAL

## 2019-08-05 ENCOUNTER — HOME CARE VISIT (OUTPATIENT)
Dept: SCHEDULING | Facility: HOME HEALTH | Age: 74
End: 2019-08-05

## 2019-08-05 PROCEDURE — G0299 HHS/HOSPICE OF RN EA 15 MIN: HCPCS

## 2021-04-30 ENCOUNTER — HOSPITAL ENCOUNTER (EMERGENCY)
Age: 76
Discharge: PSYCHIATRIC OR SUBSTANCE USE FACILITY | End: 2021-05-01
Attending: EMERGENCY MEDICINE
Payer: OTHER GOVERNMENT

## 2021-04-30 ENCOUNTER — APPOINTMENT (OUTPATIENT)
Dept: GENERAL RADIOLOGY | Age: 76
End: 2021-04-30
Attending: EMERGENCY MEDICINE
Payer: OTHER GOVERNMENT

## 2021-04-30 DIAGNOSIS — R45.851 SUICIDAL IDEATION: Primary | ICD-10-CM

## 2021-04-30 DIAGNOSIS — R07.9 CHEST PAIN, UNSPECIFIED TYPE: ICD-10-CM

## 2021-04-30 PROCEDURE — 85025 COMPLETE CBC W/AUTO DIFF WBC: CPT

## 2021-04-30 PROCEDURE — 93005 ELECTROCARDIOGRAM TRACING: CPT

## 2021-04-30 PROCEDURE — 83880 ASSAY OF NATRIURETIC PEPTIDE: CPT

## 2021-04-30 PROCEDURE — 71045 X-RAY EXAM CHEST 1 VIEW: CPT

## 2021-04-30 PROCEDURE — 83735 ASSAY OF MAGNESIUM: CPT

## 2021-04-30 PROCEDURE — 99285 EMERGENCY DEPT VISIT HI MDM: CPT

## 2021-04-30 PROCEDURE — 82077 ASSAY SPEC XCP UR&BREATH IA: CPT

## 2021-04-30 PROCEDURE — 74011250636 HC RX REV CODE- 250/636: Performed by: EMERGENCY MEDICINE

## 2021-04-30 PROCEDURE — 80053 COMPREHEN METABOLIC PANEL: CPT

## 2021-04-30 PROCEDURE — 84484 ASSAY OF TROPONIN QUANT: CPT

## 2021-04-30 PROCEDURE — 74011250637 HC RX REV CODE- 250/637: Performed by: EMERGENCY MEDICINE

## 2021-04-30 RX ORDER — GUAIFENESIN 100 MG/5ML
324 LIQUID (ML) ORAL
Status: COMPLETED | OUTPATIENT
Start: 2021-04-30 | End: 2021-04-30

## 2021-04-30 RX ORDER — LORAZEPAM 2 MG/ML
1 INJECTION INTRAMUSCULAR
Status: COMPLETED | OUTPATIENT
Start: 2021-04-30 | End: 2021-04-30

## 2021-04-30 RX ADMIN — LORAZEPAM 1 MG: 2 INJECTION, SOLUTION INTRAMUSCULAR; INTRAVENOUS at 23:55

## 2021-04-30 RX ADMIN — ASPIRIN 324 MG: 81 TABLET, CHEWABLE ORAL at 23:55

## 2021-05-01 VITALS
DIASTOLIC BLOOD PRESSURE: 75 MMHG | TEMPERATURE: 99.1 F | HEART RATE: 96 BPM | SYSTOLIC BLOOD PRESSURE: 123 MMHG | WEIGHT: 239 LBS | BODY MASS INDEX: 37.43 KG/M2 | OXYGEN SATURATION: 95 % | RESPIRATION RATE: 18 BRPM

## 2021-05-01 LAB
ALBUMIN SERPL-MCNC: 4 G/DL (ref 3.4–5)
ALBUMIN/GLOB SERPL: 0.8 {RATIO} (ref 0.8–1.7)
ALP SERPL-CCNC: 169 U/L (ref 45–117)
ALT SERPL-CCNC: 21 U/L (ref 16–61)
AMPHET UR QL SCN: NEGATIVE
ANION GAP SERPL CALC-SCNC: 10 MMOL/L (ref 3–18)
AST SERPL-CCNC: 12 U/L (ref 10–38)
ATRIAL RATE: 112 BPM
BARBITURATES UR QL SCN: NEGATIVE
BASOPHILS # BLD: 0 K/UL (ref 0–0.1)
BASOPHILS NFR BLD: 0 % (ref 0–2)
BENZODIAZ UR QL: NEGATIVE
BILIRUB SERPL-MCNC: 0.9 MG/DL (ref 0.2–1)
BNP SERPL-MCNC: 197 PG/ML (ref 0–1800)
BUN SERPL-MCNC: 24 MG/DL (ref 7–18)
BUN/CREAT SERPL: 17 (ref 12–20)
CALCIUM SERPL-MCNC: 9.6 MG/DL (ref 8.5–10.1)
CALCULATED P AXIS, ECG09: 54 DEGREES
CALCULATED R AXIS, ECG10: -10 DEGREES
CALCULATED T AXIS, ECG11: 85 DEGREES
CANNABINOIDS UR QL SCN: NEGATIVE
CHLORIDE SERPL-SCNC: 103 MMOL/L (ref 100–111)
CO2 SERPL-SCNC: 23 MMOL/L (ref 21–32)
COCAINE UR QL SCN: POSITIVE
COVID-19 RAPID TEST, COVR: NOT DETECTED
CREAT SERPL-MCNC: 1.38 MG/DL (ref 0.6–1.3)
DIAGNOSIS, 93000: NORMAL
DIFFERENTIAL METHOD BLD: ABNORMAL
EOSINOPHIL # BLD: 0.1 K/UL (ref 0–0.4)
EOSINOPHIL NFR BLD: 1 % (ref 0–5)
ERYTHROCYTE [DISTWIDTH] IN BLOOD BY AUTOMATED COUNT: 15.2 % (ref 11.6–14.5)
ETHANOL SERPL-MCNC: 9 MG/DL (ref 0–3)
GLOBULIN SER CALC-MCNC: 4.9 G/DL (ref 2–4)
GLUCOSE BLD STRIP.AUTO-MCNC: 137 MG/DL (ref 70–110)
GLUCOSE BLD STRIP.AUTO-MCNC: 163 MG/DL (ref 70–110)
GLUCOSE SERPL-MCNC: 279 MG/DL (ref 74–99)
HCT VFR BLD AUTO: 47 % (ref 36–48)
HDSCOM,HDSCOM: ABNORMAL
HGB BLD-MCNC: 15.6 G/DL (ref 13–16)
LYMPHOCYTES # BLD: 1.6 K/UL (ref 0.9–3.6)
LYMPHOCYTES NFR BLD: 14 % (ref 21–52)
MAGNESIUM SERPL-MCNC: 2.3 MG/DL (ref 1.6–2.6)
MCH RBC QN AUTO: 31.3 PG (ref 24–34)
MCHC RBC AUTO-ENTMCNC: 33.2 G/DL (ref 31–37)
MCV RBC AUTO: 94.4 FL (ref 74–97)
METHADONE UR QL: NEGATIVE
MONOCYTES # BLD: 1 K/UL (ref 0.05–1.2)
MONOCYTES NFR BLD: 8 % (ref 3–10)
NEUTS SEG # BLD: 9.1 K/UL (ref 1.8–8)
NEUTS SEG NFR BLD: 77 % (ref 40–73)
OPIATES UR QL: NEGATIVE
P-R INTERVAL, ECG05: 148 MS
PCP UR QL: NEGATIVE
PLATELET # BLD AUTO: 375 K/UL (ref 135–420)
PMV BLD AUTO: 8.5 FL (ref 9.2–11.8)
POTASSIUM SERPL-SCNC: 4.3 MMOL/L (ref 3.5–5.5)
PROT SERPL-MCNC: 8.9 G/DL (ref 6.4–8.2)
Q-T INTERVAL, ECG07: 340 MS
QRS DURATION, ECG06: 70 MS
QTC CALCULATION (BEZET), ECG08: 464 MS
RBC # BLD AUTO: 4.98 M/UL (ref 4.35–5.65)
SODIUM SERPL-SCNC: 136 MMOL/L (ref 136–145)
SOURCE, COVRS: NORMAL
TROPONIN I SERPL-MCNC: <0.02 NG/ML (ref 0–0.04)
TROPONIN I SERPL-MCNC: <0.02 NG/ML (ref 0–0.04)
VENTRICULAR RATE, ECG03: 112 BPM
WBC # BLD AUTO: 11.9 K/UL (ref 4.6–13.2)

## 2021-05-01 PROCEDURE — 74011250637 HC RX REV CODE- 250/637: Performed by: EMERGENCY MEDICINE

## 2021-05-01 PROCEDURE — 80307 DRUG TEST PRSMV CHEM ANLYZR: CPT

## 2021-05-01 PROCEDURE — 87635 SARS-COV-2 COVID-19 AMP PRB: CPT

## 2021-05-01 PROCEDURE — 82962 GLUCOSE BLOOD TEST: CPT

## 2021-05-01 RX ORDER — ACETAMINOPHEN 500 MG
1000 TABLET ORAL
Status: COMPLETED | OUTPATIENT
Start: 2021-05-01 | End: 2021-05-01

## 2021-05-01 RX ADMIN — ACETAMINOPHEN 1000 MG: 500 TABLET ORAL at 12:08

## 2021-05-01 NOTE — BSMART NOTE
Comprehensive Assessment Form Part 1    Section I - Disposition      The Medical Doctor to Psychiatrist conference was completed with Dr. Bertrand Green to on call psychiatrist at [facility],     The plan is for the patient to be transferred to [facility].   The on-call Psychiatrist consulted was Dr. Anneliese Wallace. Section II - Integrated Summary    Summary: Crisis evaluation completed per request of Dr. Aminta Bae. Patient stated he came to ED due to \"chest pain, depression and suicidal thoughts\"  He complained of chest pain during interview and he stated he is suicidal because \"I have to wait on other people, and when they can't do for me right then, I gets upset\". Patient stated he has no pervious history of psych admit other than \"many, many years ago in the [facility]\". He stated his psychiatrist is Dr. Demarcus Rutherford at the [place], where he receives most of his care, Patient was not able to recall his medication stating \"oh so much, I have a bunch, I don't know the names\". He has history of depression and PTSD with VA services, he served 2 yrs in [facility].     The patient is deemed competent to provide informed consent. The information is given by the patient. The Chief Complaint is suicidal ideation. The Precipitant Factors are \"Stress, my wife, I get irritated when people can't do for me right then\". Previous Hospitalizations: [facility].  The patient has not previously been in restraints. Current Psychiatrist is Dr. Tejinder Hopson and Tiffani Gonzalez. Lethality Assessment:    The potential for suicide is noted by the following: \"not right now, but I wouldn't take that out of the equation\", noted by the following;  previous history of attempt which occured on (4/30/2021) per his report and ED admission, and current substance abuse \"I used crack, but it didn't work out\". The potential for homicide is not noted. The patient has not been a perpetrator of sexual or physical abuse.   There are not pending charges. The patient is not felt to be at risk for self harm or harm to others. He denies current thoughts to harm self however stated \"not right now, but I wouldn't take that out of the equation\". Section III - Psychosocial  The patient's overall mood and attitude is a bit irritable, sad and depresssed. Feelings of helplessness and hopelessness are observed by his words and \"\"I have to have people do stuff for me and they can't do it all the time and that irritates me\". Generalized anxiety is not observed. Panic is not observed. Phobias are not observed. Obsessive compulsive tendencies are not observed. Section IV - Mental Status Exam  The patient's appearance shows no evidence of impairment. The patient's behavior shows no evidence of impairment. The patient is oriented to time, place, person and situation. The patient's speech shows no evidence of impairment. The patient's mood  is depressed. The range of affect shows no evidence of impairment. The patient's thought content  demonstrates no evidence of impairment. The thought process shows no evidence of impairment. The patient's perception shows no evidence of impairment. The patient's memory is impaired \"I am forgetful\", unable to determine the day of week. The patient's appetite shows no evidence of impairment. The patient's sleep shows no evidence of impairment, he reports 8 hours of sleep disturbed split between the day and night. The patient's insight is blaming, \"my wife is my worry, she contributes to my problems\". The patient's judgement is psychologically impaired. Section V - Substance Abuse  The patient is using substances.   The patient has a past history of alcohol use 5-10 years with last use on Friday 4/30 in an unknown amount, he stated he does not drink every day and not very often \"just drank that because I wanted to hurt my self\", cocaine \"crack\" by inhalation for 1-5 years with last use on 4/30/2021 in the amount of \"2 grams or 2 ounces\", he reported he has not used prior to this incident in the last 3-4 years. The patient has experienced the following withdrawal symptoms,  He denies having withdrawal symptoms, \"no, I haven't been drinking or smoking everyday\". Section VI - Living Arrangements  The patient is . The spouses approximate age is 68 and appears to be in \"fair\" health. The patient lives with a spouse. The patient has 4 children ages 62, 48, 52, 50. The patient does plan to return home upon discharge. However stated \"I'm not in a hurry to go back there, I know I need help for my chest and what ever else ails me\". The patient does not have legal issues pending. The patient's source of income comes from [employer] [address] [place] [address], 2 yrs in [facility].  Zoroastrian and cultural practices have not been voiced at this time. The patient's greatest support comes from [facility] and wife will be involved with the treatment. The patient has not been in an event described as horrible or outside the realm of ordinary life experience either currently or in the past.  The patient has not been a victim of sexual/physical abuse. Section VII - Other Areas of Clinical Concern  The highest grade achieved is 1 year of college at [employer] with the overall quality of school experience being described as average. The patient is currently  disabled and speaks [place] as a primary language. The patient has no communication impairments affecting communication. The patient's preference for learning can be described as: can read and write adequately. The patient's hearing is uses a hearing aid, not on his person stated \"they are home\". The patient's vision is normal, with past history of cataract surgery.       Karen Doty, RN, MSN,

## 2021-05-01 NOTE — BSMART NOTE
Crisis Note: Discussed patient with MD on call with patient to to be accepted if he is not transferred to [facility], Spoke with Dr. Michael Goddard regarding patient and the possibility of transferring to [facility]. [facility] with request to complete Doctor to Doctor conference prior to accepting patient.  Dr. Michael Goddard has reported to writer that he will be accepted to [facility].

## 2021-05-01 NOTE — ED TRIAGE NOTES
Chest pain all day after smoking crack.  ekg done immediately.   During vitals pt states he has been trying to kill himself over the last 2 days by \"smoking a lot of crack\"  Denies HI

## 2021-05-01 NOTE — ED PROVIDER NOTES
EMERGENCY DEPARTMENT HISTORY AND PHYSICAL EXAM    2:54 AM  Date: 4/30/2021  Patient Name: Cristian Stein    History of Presenting Illness     Chief Complaint   Patient presents with    Chest Pain    Suicidal        History Provided By: Patient    HPI: Cristian Stein is a 67 y.o. male with history of depression and substance abuse. Significant cardiac history as well. Patient is presenting with suicidal ideation. He states that he has been smoking a lot of cocaine to try to kill himself. Patient is also complaining of intermittent chest pain for the past 12 years associated with shortness of breath. Pain is sharp and lasts a few seconds. He is currently pain-free. No history of fever cough. No GI symptoms. Denies using other substances but admits to drinking alcohol yesterday. No HI. Location:  Severity:  Timing/course: Onset/Duration:     PCP: RY Gordillo    Past History     Past Medical History:  Past Medical History:   Diagnosis Date    Arthritis     Depression     Diabetes ([address]     ETOH abuse     Psychiatric disorder     PTSD       Past Surgical History:  No past surgical history on file. Family History:  No family history on file. Social History:  Social History     Tobacco Use    Smoking status: Current Every Day Smoker     Packs/day: 1.00   Substance Use Topics    Alcohol use: Yes    Drug use: Not on file       Allergies: Allergies   Allergen Reactions    Iodine Unknown (comments)    Shellfish Derived Unknown (comments)       Review of Systems   Review of Systems   Respiratory: Positive for shortness of breath. Cardiovascular: Positive for chest pain. Psychiatric/Behavioral: Positive for dysphoric mood, self-injury and suicidal ideas. All other systems reviewed and are negative.        Physical Exam     Patient Vitals for the past 12 hrs:   Temp Pulse Resp BP SpO2   04/30/21 2254 98 °F (36.7 °C) (!) 110 16 134/78 95 %       Physical Exam  Vitals signs and nursing note reviewed. Constitutional:       General: He is not in acute distress. HENT:      Head: Normocephalic and atraumatic. Eyes:      Extraocular Movements: Extraocular movements intact. Neck:      Musculoskeletal: Normal range of motion and neck supple. Cardiovascular:      Rate and Rhythm: Normal rate. Pulmonary:      Effort: Pulmonary effort is normal. No respiratory distress. Musculoskeletal: Normal range of motion. Neurological:      General: No focal deficit present. Mental Status: He is alert and oriented to person, place, and time. Psychiatric:         Attention and Perception: Attention normal.         Mood and Affect: Mood normal.         Speech: Speech is slurred. Behavior: Behavior normal. Behavior is cooperative. Thought Content: Thought content includes suicidal ideation. Thought content includes suicidal plan. Diagnostic Study Results     Labs -  Recent Results (from the past 12 hour(s))   CBC WITH AUTOMATED DIFF    Collection Time: 04/30/21 11:00 PM   Result Value Ref Range    WBC 11.9 4.6 - 13.2 K/uL    RBC 4.98 4.35 - 5.65 M/uL    HGB 15.6 13.0 - 16.0 g/dL    HCT 47.0 36.0 - 48.0 %    MCV 94.4 74.0 - 97.0 FL    MCH 31.3 24.0 - 34.0 PG    MCHC 33.2 31.0 - 37.0 g/dL    RDW 15.2 (H) 11.6 - 14.5 %    PLATELET 814 255 - 252 K/uL    MPV 8.5 (L) 9.2 - 11.8 FL    NEUTROPHILS 77 (H) 40 - 73 %    LYMPHOCYTES 14 (L) 21 - 52 %    MONOCYTES 8 3 - 10 %    EOSINOPHILS 1 0 - 5 %    BASOPHILS 0 0 - 2 %    ABS. NEUTROPHILS 9.1 (H) 1.8 - 8.0 K/UL    ABS. LYMPHOCYTES 1.6 0.9 - 3.6 K/UL    ABS. MONOCYTES 1.0 0.05 - 1.2 K/UL    ABS. EOSINOPHILS 0.1 0.0 - 0.4 K/UL    ABS.  BASOPHILS 0.0 0.0 - 0.1 K/UL    DF AUTOMATED     METABOLIC PANEL, COMPREHENSIVE    Collection Time: 04/30/21 11:00 PM   Result Value Ref Range    Sodium 136 136 - 145 mmol/L    Potassium 4.3 3.5 - 5.5 mmol/L    Chloride 103 100 - 111 mmol/L    CO2 23 21 - 32 mmol/L    Anion gap 10 3.0 - 18 mmol/L Glucose 279 (H) 74 - 99 mg/dL    BUN 24 (H) 7.0 - 18 MG/DL    Creatinine 1.38 (H) 0.6 - 1.3 MG/DL    BUN/Creatinine ratio 17 12 - 20      GFR est AA >60 >60 ml/min/1.73m2    GFR est non-AA 50 (L) >60 ml/min/1.73m2    Calcium 9.6 8.5 - 10.1 MG/DL    Bilirubin, total 0.9 0.2 - 1.0 MG/DL    ALT (SGPT) 21 16 - 61 U/L    AST (SGOT) 12 10 - 38 U/L    Alk. phosphatase 169 (H) 45 - 117 U/L    Protein, total 8.9 (H) 6.4 - 8.2 g/dL    Albumin 4.0 3.4 - 5.0 g/dL    Globulin 4.9 (H) 2.0 - 4.0 g/dL    A-G Ratio 0.8 0.8 - 1.7     NT-PRO BNP    Collection Time: 04/30/21 11:00 PM   Result Value Ref Range    NT pro- 0 - 1,800 PG/ML   TROPONIN I    Collection Time: 04/30/21 11:00 PM   Result Value Ref Range    Troponin-I, QT <0.02 0.0 - 0.045 NG/ML   MAGNESIUM    Collection Time: 04/30/21 11:00 PM   Result Value Ref Range    Magnesium 2.3 1.6 - 2.6 mg/dL   ETHYL ALCOHOL    Collection Time: 04/30/21 11:00 PM   Result Value Ref Range    ALCOHOL(ETHYL),SERUM 9 (H) 0 - 3 MG/DL       Radiologic Studies -   No results found. Medical Decision Making     ED Course: Progress Notes, Reevaluation, and Consults:    2:54 AM Initial assessment performed. The patients presenting problems have been discussed, and they/their family are in agreement with the care plan formulated and outlined with them. I have encouraged them to ask questions as they arise throughout their visit. Provider Notes (Medical Decision Making): 65-year-old male with multiple medical problems and cocaine abuse presenting with suicidal ideation/attempt by smoking too much cocaine. Had occasional chest pain. Well-appearing on exam not in distress. He was mildly tachycardic and having some chest discomfort when he came in so Ativan was ordered which seemed to help. Is also given aspirin. Screening labs were ordered and are unremarkable. Will get 2 sets of cardiac enzymes then discussed with crisis.     Procedures:     Critical Care Time:     Vital Signs-Reviewed the patient's vital signs. Reviewed pt's pulse ox reading. EKG: Interpreted by the EP. Time Interpreted:    Rate:    Rhythm:    Interpretation:   Comparison:     Records Reviewed: Nursing Notes, Old Medical Records, Previous electrocardiograms, Previous Radiology Studies and Previous Laboratory Studies (Time of Review: 2:54 AM)  -I am the first provider for this patient.  -I reviewed the vital signs, available nursing notes, past medical history, past surgical history, family history and social history. Current Outpatient Medications   Medication Sig Dispense Refill    carvedilol (COREG) 6.25 mg tablet Take 3.125 mg by mouth two (2) times a day.  cholecalciferol (VITAMIN D3) 1,000 unit tablet Take 3,000 Units by mouth daily.  amitriptyline (ELAVIL) 25 mg tablet Take 25 mg by mouth nightly.  clopidogrel (PLAVIX) 75 mg tab Take 75 mg by mouth daily.  pregabalin (LYRICA) 150 mg capsule Take 150 mg by mouth two (2) times a day.  Omeprazole delayed release (PRILOSEC D/R) 20 mg tablet Take 20 mg by mouth daily.  potassium chloride (K-DUR, KLOR-CON) 20 mEq tablet Take 10 mEq by mouth daily.  atorvastatin (LIPITOR) 80 mg tablet Take 80 mg by mouth nightly.  empagliflozin (JARDIANCE) 10 mg tablet Take 10 mg by mouth every morning.  cyanocobalamin (VITAMIN B12) 500 mcg tablet Take 100 mcg by mouth daily.  furosemide (LASIX) 20 mg tablet Take 20 mg by mouth daily.  aspirin delayed-release 81 mg tablet Take 81 mg by mouth daily.  nitroglycerin (NITROSTAT) 0.4 mg SL tablet 0.4 mg by SubLINGual route every five (5) minutes as needed for Chest Pain.  isosorbide mononitrate ER (IMDUR) 60 mg CR tablet Take 30 mg by mouth every morning.  fluticasone propionate (FLONASE) 50 mcg/actuation nasal spray 2 Sprays by Both Nostrils route daily as needed for Allergies or Rhinitis.       loratadine (CLARITIN) 10 mg tablet Take 10 mg by mouth daily as needed for Allergies or Rhinitis.  lidocaine-prilocaine (EMLA) topical cream Apply 1 Each to affected area as needed for Pain (foot).  diclofenac (VOLTAREN) 1 % gel Apply 2 g to affected area daily.  latanoprost (XALATAN) 0.005 % ophthalmic solution Administer 1 Drop to both eyes nightly.  brinzolamide-brimonidine (SIMBRINZA) 1-0.2 % drps Administer 1 Drop to both eyes three (3) times daily.  ammonium lactate (AMLACTIN) 12 % topical cream apply to the affected area      insulin aspart U-100 (NOVOLOG FLEXPEN U-100 INSULIN) 100 unit/mL (3 mL) inpn 8 Units by SubCUTAneous route Before breakfast, lunch, and dinner.  ipratropium-albuterol (COMBIVENT RESPIMAT)  mcg/actuation inhaler Take 1 Puff by inhalation four (4) times daily.  albuterol (PROVENTIL HFA, VENTOLIN HFA, PROAIR HFA) 90 mcg/actuation inhaler Take 2 Puffs by inhalation every four (4) hours as needed for Shortness of Breath.  insulin glargine (LANTUS,BASAGLAR) 100 unit/mL (3 mL) inpn 38 Units by SubCUTAneous route nightly.  insulin lispro (HUMALOG U-100 INSULIN) 100 unit/mL injection 8 Units by SubCUTAneous route three (3) times daily.  aspirin 81 mg chewable tablet Take 1 Tab by mouth daily. 30 Tab 0    oxyCODONE-acetaminophen (PERCOCET) 5-325 mg per tablet Take 2 Tabs by mouth every six (6) hours as needed for Pain. 30 Tab 0    traZODone (DESYREL) 50 mg tablet Take 50 mg by mouth nightly.  gabapentin (NEURONTIN) 100 mg capsule Take 100 mg by mouth three (3) times daily.  FLUoxetine (PROZAC) 10 mg capsule Take 10 mg by mouth daily. Clinical Impression     Clinical Impression:   1. Suicidal ideation    2. Chest pain, unspecified type        Disposition: This note was dictated utilizing voice recognition software which may lead to typographical errors. I apologize in advance if the situation occurs.   If questions arise please do not hesitate to contact me or call our department.     Brooke Travis MD  2:54 AM

## 2021-05-02 NOTE — ED NOTES
Pt transferred to [place] for mental health services via [address] [place] [address],[address]. Pt stable and calm at time of transfer. Respirations even and unlabored. IV pulled.

## 2021-05-03 LAB
AMPHET UR QL SCN: NEGATIVE
BARBITURATES UR QL SCN: NEGATIVE
BENZODIAZ UR QL: NEGATIVE
CANNABINOIDS UR QL SCN: NEGATIVE
COCAINE UR QL SCN: POSITIVE
HDSCOM,HDSCOM: ABNORMAL
METHADONE UR QL: NEGATIVE
OPIATES UR QL: NEGATIVE
PCP UR QL: NEGATIVE

## 2021-05-28 NOTE — ED NOTES
Turned over to me Dr. Sury Terrell 48-year-old gentleman presenting with substance abuse starting to kill himself by smoking a lot of crack patient has intermittent chest pain as described by Dr. Dulce Leo. Troponin negative x2    IMPRESSION     Left costophrenic angle pleural-parenchymal opacity could be due to chronic  scar, atelectasis or small pleural effusion. Stable to prior. Post CABG. This point I do not think he needs any further testing no nausea no vomiting or diaphoresis pain is very short-lived. No fever no chills no cough. I did discuss this with Dr. Shaquille Faust over at the [place] who accepts the patient in transfer. The patient did request transfer. Yes

## 2021-06-12 ENCOUNTER — APPOINTMENT (OUTPATIENT)
Dept: CT IMAGING | Age: 76
End: 2021-06-12
Attending: PHYSICIAN ASSISTANT
Payer: OTHER GOVERNMENT

## 2021-06-12 ENCOUNTER — HOSPITAL ENCOUNTER (EMERGENCY)
Age: 76
Discharge: HOME | End: 2021-06-12
Attending: EMERGENCY MEDICINE
Payer: OTHER GOVERNMENT

## 2021-06-12 VITALS
HEIGHT: 67 IN | WEIGHT: 209 LBS | OXYGEN SATURATION: 95 % | SYSTOLIC BLOOD PRESSURE: 136 MMHG | HEART RATE: 90 BPM | RESPIRATION RATE: 18 BRPM | DIASTOLIC BLOOD PRESSURE: 68 MMHG | BODY MASS INDEX: 32.8 KG/M2 | TEMPERATURE: 98.2 F

## 2021-06-12 DIAGNOSIS — S39.012A LOW BACK STRAIN, INITIAL ENCOUNTER: ICD-10-CM
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
## 2021-06-12 DIAGNOSIS — S16.1XXA NECK STRAIN, INITIAL ENCOUNTER: Primary | ICD-10-CM

## 2021-06-12 PROCEDURE — 72125 CT NECK SPINE W/O DYE: CPT

## 2021-06-12 PROCEDURE — 99282 EMERGENCY DEPT VISIT SF MDM: CPT
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
## 2022-03-19 PROBLEM — J18.9 CAP (COMMUNITY ACQUIRED PNEUMONIA): Status: ACTIVE | Noted: 2019-07-29

## 2022-05-16 ENCOUNTER — APPOINTMENT (OUTPATIENT)
Dept: GENERAL RADIOLOGY | Age: 77
End: 2022-05-16
Attending: STUDENT IN AN ORGANIZED HEALTH CARE EDUCATION/TRAINING PROGRAM

## 2022-05-16 ENCOUNTER — HOSPITAL ENCOUNTER (EMERGENCY)
Age: 77
Discharge: HOME | End: 2022-05-17
Attending: STUDENT IN AN ORGANIZED HEALTH CARE EDUCATION/TRAINING PROGRAM

## 2022-05-16 DIAGNOSIS — D64.9 ANEMIA, UNSPECIFIED TYPE: ICD-10-CM

## 2022-05-16 DIAGNOSIS — I25.10 CORONARY ARTERY DISEASE INVOLVING NATIVE HEART WITHOUT ANGINA PECTORIS, UNSPECIFIED VESSEL OR LESION TYPE: ICD-10-CM

## 2022-05-16 DIAGNOSIS — R07.9 ACUTE CHEST PAIN: Primary | ICD-10-CM

## 2022-05-16 LAB
ANION GAP SERPL CALC-SCNC: 7 MMOL/L (ref 3–18)
BASOPHILS # BLD: 0 K/UL (ref 0–0.1)
BASOPHILS NFR BLD: 0 % (ref 0–2)
BNP SERPL-MCNC: 60 PG/ML (ref 0–1800)
BUN SERPL-MCNC: 16 MG/DL (ref 7–18)
BUN/CREAT SERPL: 17 (ref 12–20)
CALCIUM SERPL-MCNC: 8.7 MG/DL (ref 8.5–10.1)
CHLORIDE SERPL-SCNC: 109 MMOL/L (ref 100–111)
CO2 SERPL-SCNC: 26 MMOL/L (ref 21–32)
CREAT SERPL-MCNC: 0.96 MG/DL (ref 0.6–1.3)
DIFFERENTIAL METHOD BLD: ABNORMAL
EOSINOPHIL # BLD: 0.3 K/UL (ref 0–0.4)
EOSINOPHIL NFR BLD: 3 % (ref 0–5)
ERYTHROCYTE [DISTWIDTH] IN BLOOD BY AUTOMATED COUNT: 14.2 % (ref 11.6–14.5)
GLUCOSE SERPL-MCNC: 77 MG/DL (ref 74–99)
HCT VFR BLD AUTO: 33.8 % (ref 36–48)
HGB BLD-MCNC: 11.2 G/DL (ref 13–16)
IMM GRANULOCYTES # BLD AUTO: 0 K/UL (ref 0–0.04)
IMM GRANULOCYTES NFR BLD AUTO: 0 % (ref 0–0.5)
LYMPHOCYTES # BLD: 2.9 K/UL (ref 0.9–3.6)
LYMPHOCYTES NFR BLD: 32 % (ref 21–52)
MAGNESIUM SERPL-MCNC: 2.1 MG/DL (ref 1.6–2.6)
MCH RBC QN AUTO: 31.8 PG (ref 24–34)
MCHC RBC AUTO-ENTMCNC: 33.1 G/DL (ref 31–37)
MCV RBC AUTO: 96 FL (ref 78–100)
MONOCYTES # BLD: 0.7 K/UL (ref 0.05–1.2)
MONOCYTES NFR BLD: 8 % (ref 3–10)
NEUTS SEG # BLD: 5 K/UL (ref 1.8–8)
NEUTS SEG NFR BLD: 56 % (ref 40–73)
NRBC # BLD: 0 K/UL (ref 0–0.01)
NRBC BLD-RTO: 0 PER 100 WBC
PLATELET # BLD AUTO: 292 K/UL (ref 135–420)
PMV BLD AUTO: 8.4 FL (ref 9.2–11.8)
POTASSIUM SERPL-SCNC: 4.2 MMOL/L (ref 3.5–5.5)
RBC # BLD AUTO: 3.52 M/UL (ref 4.35–5.65)
SODIUM SERPL-SCNC: 142 MMOL/L (ref 136–145)
TROPONIN-HIGH SENSITIVITY: 4 NG/L (ref 0–78)
WBC # BLD AUTO: 9 K/UL (ref 4.6–13.2)

## 2022-05-16 PROCEDURE — 83880 ASSAY OF NATRIURETIC PEPTIDE: CPT

## 2022-05-16 PROCEDURE — 84484 ASSAY OF TROPONIN QUANT: CPT

## 2022-05-16 PROCEDURE — 93005 ELECTROCARDIOGRAM TRACING: CPT

## 2022-05-16 PROCEDURE — 80048 BASIC METABOLIC PNL TOTAL CA: CPT

## 2022-05-16 PROCEDURE — 83735 ASSAY OF MAGNESIUM: CPT

## 2022-05-16 PROCEDURE — 74011250637 HC RX REV CODE- 250/637: Performed by: STUDENT IN AN ORGANIZED HEALTH CARE EDUCATION/TRAINING PROGRAM

## 2022-05-16 PROCEDURE — 85025 COMPLETE CBC W/AUTO DIFF WBC: CPT

## 2022-05-16 PROCEDURE — 99285 EMERGENCY DEPT VISIT HI MDM: CPT

## 2022-05-16 PROCEDURE — 71045 X-RAY EXAM CHEST 1 VIEW: CPT

## 2022-05-16 RX ORDER — GUAIFENESIN 100 MG/5ML
243 LIQUID (ML) ORAL
Status: COMPLETED | OUTPATIENT
Start: 2022-05-16 | End: 2022-05-16

## 2022-05-16 RX ADMIN — ASPIRIN 81 MG 243 MG: 81 TABLET ORAL at 22:59

## 2022-05-17 VITALS
TEMPERATURE: 97.9 F | RESPIRATION RATE: 18 BRPM | HEIGHT: 67 IN | OXYGEN SATURATION: 100 % | HEART RATE: 73 BPM | DIASTOLIC BLOOD PRESSURE: 43 MMHG | WEIGHT: 189 LBS | SYSTOLIC BLOOD PRESSURE: 105 MMHG | BODY MASS INDEX: 29.66 KG/M2

## 2022-05-17 LAB
ATRIAL RATE: 85 BPM
CALCULATED P AXIS, ECG09: 63 DEGREES
CALCULATED R AXIS, ECG10: 3 DEGREES
CALCULATED T AXIS, ECG11: 48 DEGREES
DIAGNOSIS, 93000: NORMAL
P-R INTERVAL, ECG05: 186 MS
Q-T INTERVAL, ECG07: 370 MS
QRS DURATION, ECG06: 74 MS
QTC CALCULATION (BEZET), ECG08: 440 MS
TROPONIN-HIGH SENSITIVITY: 4 NG/L (ref 0–78)
VENTRICULAR RATE, ECG03: 85 BPM

## 2022-05-17 PROCEDURE — 84484 ASSAY OF TROPONIN QUANT: CPT

## 2022-05-17 NOTE — ED TRIAGE NOTES
Pt arrives with chest pain, sub sternum pain that started  A few hours ago. Pain doesn't radiate anywhere. . Pt able to speakin full sentences.

## 2022-05-17 NOTE — ED NOTES
Bedside shift change report given to [address] [address] (oncoming nurse) by Carla Carias. Report included the following information SBAR, ED Summary and MAR.

## 2022-05-17 NOTE — ED PROVIDER NOTES
EMERGENCY DEPARTMENT HISTORY AND PHYSICAL EXAM      Date: 5/16/2022  Patient Name: Odette Koyanagi    History of Presenting Illness     Chief Complaint   Patient presents with    Chest Pain    Respiratory Distress       History (Context): Odette Koyanagi is a 65 y.o. male with a past medical history significant for diabetes, psychiatric disorder, depression, CAD comes into the ED today due to chest pain. Patient states chest pain began approximate 1 to 2 hours prior to arrival in the emergency department. He states chest pain is located to the substernal/left-sided chest, described as tight, without any alleviating exacerbating factors, and nonradiating. He states significant cardiac history with multiple stents placed as well as CABG. Patient  does admit to taking nitroglycerin for treatment of symptoms prior to arrival.  He denies any fever, chills, dyspnea, abdominal pain, nausea, vomiting, or diarrhea. He describes his symptoms as moderate in severity. He states improvement of his symptoms upon arrival.    PCP: Wilber Paez MD    Current Outpatient Medications   Medication Sig Dispense Refill    carvedilol (COREG) 6.25 mg tablet Take 3.125 mg by mouth two (2) times a day.  cholecalciferol (VITAMIN D3) 1,000 unit tablet Take 3,000 Units by mouth daily.  amitriptyline (ELAVIL) 25 mg tablet Take 25 mg by mouth nightly.  clopidogrel (PLAVIX) 75 mg tab Take 75 mg by mouth daily.  pregabalin (LYRICA) 150 mg capsule Take 150 mg by mouth two (2) times a day.  Omeprazole delayed release (PRILOSEC D/R) 20 mg tablet Take 20 mg by mouth daily.  potassium chloride (K-DUR, KLOR-CON) 20 mEq tablet Take 10 mEq by mouth daily.  atorvastatin (LIPITOR) 80 mg tablet Take 80 mg by mouth nightly.  empagliflozin (JARDIANCE) 10 mg tablet Take 10 mg by mouth every morning.  cyanocobalamin (VITAMIN B12) 500 mcg tablet Take 100 mcg by mouth daily.       furosemide (LASIX) 20 mg tablet Take 20 mg by mouth daily.  nitroglycerin (NITROSTAT) 0.4 mg SL tablet 0.4 mg by SubLINGual route every five (5) minutes as needed for Chest Pain.  isosorbide mononitrate ER (IMDUR) 60 mg CR tablet Take 30 mg by mouth every morning.  fluticasone propionate (FLONASE) 50 mcg/actuation nasal spray 2 Sprays by Both Nostrils route daily as needed for Allergies or Rhinitis.  loratadine (CLARITIN) 10 mg tablet Take 10 mg by mouth daily as needed for Allergies or Rhinitis.  lidocaine-prilocaine (EMLA) topical cream Apply 1 Each to affected area as needed for Pain (foot).  diclofenac (VOLTAREN) 1 % gel Apply 2 g to affected area daily.  latanoprost (XALATAN) 0.005 % ophthalmic solution Administer 1 Drop to both eyes nightly.  brinzolamide-brimonidine (SIMBRINZA) 1-0.2 % drps Administer 1 Drop to both eyes three (3) times daily.  ammonium lactate (AMLACTIN) 12 % topical cream apply to the affected area      insulin aspart U-100 (NOVOLOG FLEXPEN U-100 INSULIN) 100 unit/mL (3 mL) inpn 8 Units by SubCUTAneous route Before breakfast, lunch, and dinner.  ipratropium-albuterol (COMBIVENT RESPIMAT)  mcg/actuation inhaler Take 1 Puff by inhalation four (4) times daily.  albuterol (PROVENTIL HFA, VENTOLIN HFA, PROAIR HFA) 90 mcg/actuation inhaler Take 2 Puffs by inhalation every four (4) hours as needed for Shortness of Breath.  insulin glargine (LANTUS,BASAGLAR) 100 unit/mL (3 mL) inpn 38 Units by SubCUTAneous route nightly.  aspirin 81 mg chewable tablet Take 1 Tab by mouth daily. 30 Tab 0    oxyCODONE-acetaminophen (PERCOCET) 5-325 mg per tablet Take 2 Tabs by mouth every six (6) hours as needed for Pain. 30 Tab 0    traZODone (DESYREL) 50 mg tablet Take 50 mg by mouth nightly.  gabapentin (NEURONTIN) 100 mg capsule Take 100 mg by mouth three (3) times daily.  FLUoxetine (PROZAC) 10 mg capsule Take 10 mg by mouth daily.          Past History     Past Medical History:   Past Medical History:   Diagnosis Date    Arthritis     Depression     Diabetes ([address]     ETOH abuse     Psychiatric disorder     PTSD       Past Surgical History:  No past surgical history on file. Family History:  History reviewed. No pertinent family history. Social History:   Social History     Tobacco Use    Smoking status: Former Smoker     Packs/day: 1.00    Smokeless tobacco: Never Used   Substance Use Topics    Alcohol use: Not Currently    Drug use: Not Currently     Types: Cocaine     Comment: crack cocaine last used in April 2021       Allergies: Allergies   Allergen Reactions    Iodine Unknown (comments)    Shellfish Derived Unknown (comments)       PMH, PSH, family history, social history, allergies reviewed with the patient with significant items noted above. Review of Systems   Review of Systems   Constitutional: Negative for chills and fever. HENT: Negative for sore throat. Eyes: Negative for visual disturbance. Negative recent vision problems   Respiratory: Negative for shortness of breath. Cardiovascular: Positive for chest pain. Gastrointestinal: Negative for abdominal pain, diarrhea and nausea. Genitourinary: Negative for difficulty urinating. Musculoskeletal: Negative for myalgias. Skin: Negative for rash. Neurological: Negative for headaches. Negative altered level of consciousness   All other systems reviewed and are negative. Physical Exam     Vitals:    05/16/22 2301 05/16/22 2331 05/17/22 0001 05/17/22 0031   BP: (!) 115/52 (!) 97/52 (!) 102/51 (!) 105/43   Pulse: 77 71 70 73   Resp: 20 21 17 18   Temp:       SpO2: 97% 97% 96% 100%   Weight:       Height:           Physical Exam  Vitals and nursing note reviewed. Constitutional:       General: He is not in acute distress. Appearance: Normal appearance. He is not ill-appearing or toxic-appearing. HENT:      Head: Normocephalic and atraumatic. Mouth/Throat:      Mouth: Mucous membranes are moist.   Eyes:      General: No scleral icterus. Conjunctiva/sclera: Conjunctivae normal.   Cardiovascular:      Rate and Rhythm: Normal rate and regular rhythm. Pulses: Normal pulses. Pulmonary:      Effort: Pulmonary effort is normal. No respiratory distress. Abdominal:      General: There is no distension. Palpations: Abdomen is soft. Tenderness: There is no abdominal tenderness. Musculoskeletal:         General: No deformity. Normal range of motion. Cervical back: Normal range of motion and neck supple. Right lower leg: No edema. Left lower leg: No edema. Skin:     General: Skin is warm and dry. Findings: No rash. Neurological:      General: No focal deficit present. Mental Status: He is alert and oriented to person, place, and time. Mental status is at baseline.    Psychiatric:         Mood and Affect: Mood normal.         Behavior: Behavior normal.         Diagnostic Study Results     Labs -     Recent Results (from the past 12 hour(s))   EKG, 12 LEAD, INITIAL    Collection Time: 05/16/22  9:45 PM   Result Value Ref Range    Ventricular Rate 85 BPM    Atrial Rate 85 BPM    P-R Interval 186 ms    QRS Duration 74 ms    Q-T Interval 370 ms    QTC Calculation (Bezet) 440 ms    Calculated P Axis 63 degrees    Calculated R Axis 3 degrees    Calculated T Axis 48 degrees    Diagnosis       Normal sinus rhythm  Inferior infarct (cited on or before 29-JUL-2019)  Anteroseptal infarct (cited on or before 29-JUL-2019)  Abnormal ECG  When compared with ECG of 30-APR-2021 22:49,  No significant change was found     CBC WITH AUTOMATED DIFF    Collection Time: 05/16/22 10:12 PM   Result Value Ref Range    WBC 9.0 4.6 - 13.2 K/uL    RBC 3.52 (L) 4.35 - 5.65 M/uL    HGB 11.2 (L) 13.0 - 16.0 g/dL    HCT 33.8 (L) 36.0 - 48.0 %    MCV 96.0 78.0 - 100.0 FL    MCH 31.8 24.0 - 34.0 PG    MCHC 33.1 31.0 - 37.0 g/dL    RDW 14.2 11.6 - 14.5 %    PLATELET 601 661 - 239 K/uL    MPV 8.4 (L) 9.2 - 11.8 FL    NRBC 0.0 0  WBC    ABSOLUTE NRBC 0.00 0.00 - 0.01 K/uL    NEUTROPHILS 56 40 - 73 %    LYMPHOCYTES 32 21 - 52 %    MONOCYTES 8 3 - 10 %    EOSINOPHILS 3 0 - 5 %    BASOPHILS 0 0 - 2 %    IMMATURE GRANULOCYTES 0 0.0 - 0.5 %    ABS. NEUTROPHILS 5.0 1.8 - 8.0 K/UL    ABS. LYMPHOCYTES 2.9 0.9 - 3.6 K/UL    ABS. MONOCYTES 0.7 0.05 - 1.2 K/UL    ABS. EOSINOPHILS 0.3 0.0 - 0.4 K/UL    ABS. BASOPHILS 0.0 0.0 - 0.1 K/UL    ABS. IMM. GRANS. 0.0 0.00 - 0.04 K/UL    DF AUTOMATED     METABOLIC PANEL, BASIC    Collection Time: 05/16/22 10:12 PM   Result Value Ref Range    Sodium 142 136 - 145 mmol/L    Potassium 4.2 3.5 - 5.5 mmol/L    Chloride 109 100 - 111 mmol/L    CO2 26 21 - 32 mmol/L    Anion gap 7 3.0 - 18 mmol/L    Glucose 77 74 - 99 mg/dL    BUN 16 7.0 - 18 MG/DL    Creatinine 0.96 0.6 - 1.3 MG/DL    BUN/Creatinine ratio 17 12 - 20      GFR est AA >60 >60 ml/min/1.73m2    GFR est non-AA >60 >60 ml/min/1.73m2    Calcium 8.7 8.5 - 10.1 MG/DL   TROPONIN-HIGH SENSITIVITY    Collection Time: 05/16/22 10:12 PM   Result Value Ref Range    Troponin-High Sensitivity 4 0 - 78 ng/L   NT-PRO BNP    Collection Time: 05/16/22 10:12 PM   Result Value Ref Range    NT pro-BNP 60 0 - 1,800 PG/ML   MAGNESIUM    Collection Time: 05/16/22 10:12 PM   Result Value Ref Range    Magnesium 2.1 1.6 - 2.6 mg/dL   TROPONIN-HIGH SENSITIVITY    Collection Time: 05/17/22 12:25 AM   Result Value Ref Range    Troponin-High Sensitivity 4 0 - 78 ng/L      Labs Reviewed   CBC WITH AUTOMATED DIFF - Abnormal; Notable for the following components:       Result Value    RBC 3.52 (*)     HGB 11.2 (*)     HCT 33.8 (*)     MPV 8.4 (*)     All other components within normal limits   METABOLIC PANEL, BASIC   TROPONIN-HIGH SENSITIVITY   NT-PRO BNP   MAGNESIUM   TROPONIN-HIGH SENSITIVITY       Radiologic Studies -   XR CHEST PORT   Final Result   1.   Left basilar opacities could be small chronic effusions or scarring. Associated atelectasis. CT Results  (Last 48 hours)    None        CXR Results  (Last 48 hours)               05/16/22 2234  XR CHEST PORT Final result    Impression:  1. Left basilar opacities could be small chronic effusions or scarring. Associated atelectasis. Narrative:      Examination: Portable AP chest        History: Chest pain       Comparison: April 30, 2021       Findings: There is a layering left pleural effusion suspected. Possible trace   right pleural effusion. Pulmonary vascular congestion. Bibasilar atelectasis. No   pneumothorax. Cardiomegaly is stable. Prior CABG. The laboratory results, imaging results, and other diagnostic exams were reviewed in the EMR. Medical Decision Making   I am the first provider for this patient. I reviewed the vital signs, available nursing notes, past medical history, past surgical history, family history and social history. Vital Signs-Reviewed the patient's vital signs. ED EKG interpretation:  Rhythm: normal sinus rhythm; and regular . Rate (approx.): 85; Axis: left axis deviation; P wave: normal; QRS interval: normal ; ST/T wave: non-specific changes; Other findings: unchanged from previous ekg. This EKG was interpreted by Aron Solorzano D.O. Records Reviewed: Personally, on initial evaluation    MDM:   Romero Roll presents with complaint of chest pain  DDX includes but is not limited to: Chest pain, ACS, MI    Patient overall well-appearing, no acute distress, vital signs grossly within normal limits. Will obtain lab work and imaging for further evaluation of patients complaint. Will continue to monitor and evaluate patient while in the ED.       Orders as below:  Orders Placed This Encounter    XR CHEST PORT    CBC WITH AUTOMATED DIFF    METABOLIC PANEL, BASIC    TROPONIN-HIGH SENSITIVITY    PRO-BNP    MAGNESIUM    TROPONIN-HIGH SENSITIVITY    EKG, 12 LEAD, INITIAL  aspirin chewable tablet 243 mg        ED Course:   ED Course as of 05/17/22 0203   Mon May 16, 2022   2252 Patient's lab work significant for hemoglobin 11.2, otherwise labs gross within normal limits. We will continue to monitor patient. [DV]   Tue May 17, 2022   0103 Patient's repeat troponin stable. Patient without any further evidence of chest pain while here in the emergency department. Will recommend patient follow-up with his cardiologist for further treatment and evaluation. Will provide patient with strict return precautions at this time. Patient verbalized understanding is without any further questions. [DV]      ED Course User Index  [DV] Natalya Bhakta DO           Procedures:  Procedures        Diagnosis and Disposition     CLINICAL IMPRESSION:  1. Acute chest pain    2. Coronary artery disease involving native heart without angina pectoris, unspecified vessel or lesion type    3. Anemia, unspecified type      Discharge Medication List as of 5/17/2022  1:04 AM          Disposition: Home    Patient condition at time of disposition: Stable    DISCHARGE NOTE:   Pt has been reexamined. Patient has no new complaints, changes, or physical findings. Care plan outlined and precautions discussed. Results were reviewed with the patient. All medications were reviewed with the patient. All of pt's questions and concerns were addressed. Alarm symptoms and return precautions associated with chief complaint and evaluation were reviewed with the patient in detail. The patient demonstrated adequate understanding. Patient was instructed to follow up with Cardiology, as well as strict return precautions to the ED upon further deterioration. Patient is ready to go home. The patient is happy with this plan        Dragon Disclaimer     Please note that this dictation was completed with [employer], the computer voice recognition software.   Quite often unanticipated grammatical, syntax, homophones, and other interpretive errors are inadvertently transcribed by the computer software. Please disregard these errors. Please excuse any errors that have escaped final proofreading. Dagoberto Boas D. O.

## 2022-06-29 ENCOUNTER — APPOINTMENT (OUTPATIENT)
Dept: GENERAL RADIOLOGY | Age: 77
End: 2022-06-29
Attending: STUDENT IN AN ORGANIZED HEALTH CARE EDUCATION/TRAINING PROGRAM

## 2022-06-29 ENCOUNTER — HOSPITAL ENCOUNTER (EMERGENCY)
Age: 77
Discharge: HOME | End: 2022-06-29
Attending: STUDENT IN AN ORGANIZED HEALTH CARE EDUCATION/TRAINING PROGRAM

## 2022-06-29 ENCOUNTER — APPOINTMENT (OUTPATIENT)
Dept: CT IMAGING | Age: 77
End: 2022-06-29
Attending: STUDENT IN AN ORGANIZED HEALTH CARE EDUCATION/TRAINING PROGRAM

## 2022-06-29 DIAGNOSIS — T14.8XXA CONTUSION OF BONE: ICD-10-CM
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
## 2022-06-29 DIAGNOSIS — M25.561 ACUTE PAIN OF RIGHT KNEE: ICD-10-CM

## 2022-06-29 DIAGNOSIS — G44.319 ACUTE POST-TRAUMATIC HEADACHE, NOT INTRACTABLE: ICD-10-CM

## 2022-06-29 PROCEDURE — 74176 CT ABD & PELVIS W/O CONTRAST: CPT

## 2022-06-29 PROCEDURE — 73590 X-RAY EXAM OF LOWER LEG: CPT

## 2022-06-29 PROCEDURE — 99284 EMERGENCY DEPT VISIT MOD MDM: CPT

## 2022-06-29 PROCEDURE — 73562 X-RAY EXAM OF KNEE 3: CPT

## 2022-06-29 PROCEDURE — 70450 CT HEAD/BRAIN W/O DYE: CPT

## 2022-06-29 RX ORDER — METHOCARBAMOL 750 MG/1
750 TABLET, FILM COATED ORAL
Qty: 14 TABLET | Refills: 0 | Status: SHIPPED | OUTPATIENT
Start: 2022-06-29 | End: 2022-07-13

## 2022-06-29 RX ORDER — ACETAMINOPHEN 500 MG
1000 TABLET ORAL
Qty: 24 TABLET | Refills: 0 | Status: SHIPPED | OUTPATIENT
Start: 2022-06-29

## 2022-06-29 RX ORDER — LIDOCAINE 50 MG/G
PATCH TOPICAL
Qty: 14 EACH | Refills: 0 | Status: SHIPPED | OUTPATIENT
Start: 2022-06-29
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
## 2022-09-09 ENCOUNTER — HOSPITAL ENCOUNTER (INPATIENT)
Age: 77
LOS: 6 days | Discharge: HOME | DRG: 885 | End: 2022-09-15
Attending: EMERGENCY MEDICINE | Admitting: PSYCHIATRY & NEUROLOGY
Payer: MEDICARE

## 2022-09-09 DIAGNOSIS — R45.851 SUICIDAL IDEATION: Primary | ICD-10-CM

## 2022-09-09 DIAGNOSIS — F14.90 CRACK COCAINE USE: ICD-10-CM

## 2022-09-09 PROBLEM — F32.A DEPRESSION: Status: ACTIVE | Noted: 2022-09-09

## 2022-09-09 LAB
ALBUMIN SERPL-MCNC: 3.1 G/DL (ref 3.4–5)
ALBUMIN/GLOB SERPL: 0.6 {RATIO} (ref 0.8–1.7)
ALP SERPL-CCNC: 117 U/L (ref 45–117)
ALT SERPL-CCNC: 13 U/L (ref 16–61)
AMPHET UR QL SCN: NEGATIVE
ANION GAP SERPL CALC-SCNC: 5 MMOL/L (ref 3–18)
APAP SERPL-MCNC: <2 UG/ML (ref 10–30)
APPEARANCE UR: CLEAR
AST SERPL-CCNC: 8 U/L (ref 10–38)
BARBITURATES UR QL SCN: NEGATIVE
BASOPHILS # BLD: 0 K/UL (ref 0–0.1)
BASOPHILS NFR BLD: 0 % (ref 0–2)
BENZODIAZ UR QL: NEGATIVE
BILIRUB SERPL-MCNC: 1.2 MG/DL (ref 0.2–1)
BILIRUB UR QL: NEGATIVE
BUN SERPL-MCNC: 12 MG/DL (ref 7–18)
BUN/CREAT SERPL: 13 (ref 12–20)
CALCIUM SERPL-MCNC: 9.2 MG/DL (ref 8.5–10.1)
CANNABINOIDS UR QL SCN: NEGATIVE
CHLORIDE SERPL-SCNC: 106 MMOL/L (ref 100–111)
CO2 SERPL-SCNC: 26 MMOL/L (ref 21–32)
COCAINE UR QL SCN: POSITIVE
COLOR UR: YELLOW
CREAT SERPL-MCNC: 0.91 MG/DL (ref 0.6–1.3)
DIFFERENTIAL METHOD BLD: ABNORMAL
EOSINOPHIL # BLD: 0.1 K/UL (ref 0–0.4)
EOSINOPHIL NFR BLD: 1 % (ref 0–5)
ERYTHROCYTE [DISTWIDTH] IN BLOOD BY AUTOMATED COUNT: 15.4 % (ref 11.6–14.5)
FLUAV RNA SPEC QL NAA+PROBE: NOT DETECTED
FLUBV RNA SPEC QL NAA+PROBE: NOT DETECTED
FOLATE SERPL-MCNC: 9.1 NG/ML (ref 3.1–17.5)
GLOBULIN SER CALC-MCNC: 5 G/DL (ref 2–4)
GLUCOSE BLD STRIP.AUTO-MCNC: 122 MG/DL (ref 70–110)
GLUCOSE SERPL-MCNC: 140 MG/DL (ref 74–99)
GLUCOSE UR STRIP.AUTO-MCNC: NEGATIVE MG/DL
HCT VFR BLD AUTO: 37.4 % (ref 36–48)
HDSCOM,HDSCOM: ABNORMAL
HGB BLD-MCNC: 12.3 G/DL (ref 13–16)
HGB UR QL STRIP: NEGATIVE
IMM GRANULOCYTES # BLD AUTO: 0 K/UL (ref 0–0.04)
IMM GRANULOCYTES NFR BLD AUTO: 0 % (ref 0–0.5)
KETONES UR QL STRIP.AUTO: NEGATIVE MG/DL
LEUKOCYTE ESTERASE UR QL STRIP.AUTO: NEGATIVE
LYMPHOCYTES # BLD: 1.5 K/UL (ref 0.9–3.6)
LYMPHOCYTES NFR BLD: 13 % (ref 21–52)
MCH RBC QN AUTO: 31.5 PG (ref 24–34)
MCHC RBC AUTO-ENTMCNC: 32.9 G/DL (ref 31–37)
MCV RBC AUTO: 95.9 FL (ref 78–100)
METHADONE UR QL: NEGATIVE
MONOCYTES # BLD: 0.9 K/UL (ref 0.05–1.2)
MONOCYTES NFR BLD: 8 % (ref 3–10)
NEUTS SEG # BLD: 9 K/UL (ref 1.8–8)
NEUTS SEG NFR BLD: 78 % (ref 40–73)
NITRITE UR QL STRIP.AUTO: NEGATIVE
NRBC # BLD: 0 K/UL (ref 0–0.01)
NRBC BLD-RTO: 0 PER 100 WBC
OPIATES UR QL: NEGATIVE
PCP UR QL: NEGATIVE
PH UR STRIP: 7.5 [PH] (ref 5–8)
PLATELET # BLD AUTO: 393 K/UL (ref 135–420)
PMV BLD AUTO: 8.3 FL (ref 9.2–11.8)
POTASSIUM SERPL-SCNC: 4.1 MMOL/L (ref 3.5–5.5)
PROT SERPL-MCNC: 8.1 G/DL (ref 6.4–8.2)
PROT UR STRIP-MCNC: NEGATIVE MG/DL
RBC # BLD AUTO: 3.9 M/UL (ref 4.35–5.65)
SALICYLATES SERPL-MCNC: <1.7 MG/DL (ref 2.8–20)
SARS-COV-2, COV2: NOT DETECTED
SODIUM SERPL-SCNC: 137 MMOL/L (ref 136–145)
SP GR UR REFRACTOMETRY: 1.02 (ref 1–1.03)
UROBILINOGEN UR QL STRIP.AUTO: 1 EU/DL (ref 0.2–1)
VIT B12 SERPL-MCNC: 417 PG/ML (ref 211–911)
WBC # BLD AUTO: 11.6 K/UL (ref 4.6–13.2)

## 2022-09-09 PROCEDURE — 85025 COMPLETE CBC W/AUTO DIFF WBC: CPT

## 2022-09-09 PROCEDURE — 80307 DRUG TEST PRSMV CHEM ANLYZR: CPT

## 2022-09-09 PROCEDURE — 80179 DRUG ASSAY SALICYLATE: CPT

## 2022-09-09 PROCEDURE — 81003 URINALYSIS AUTO W/O SCOPE: CPT

## 2022-09-09 PROCEDURE — 82607 VITAMIN B-12: CPT

## 2022-09-09 PROCEDURE — 80053 COMPREHEN METABOLIC PANEL: CPT

## 2022-09-09 PROCEDURE — 80143 DRUG ASSAY ACETAMINOPHEN: CPT

## 2022-09-09 PROCEDURE — 74011250637 HC RX REV CODE- 250/637: Performed by: EMERGENCY MEDICINE

## 2022-09-09 PROCEDURE — 74011250637 HC RX REV CODE- 250/637: Performed by: PSYCHIATRY & NEUROLOGY

## 2022-09-09 PROCEDURE — 65220000003 HC RM SEMIPRIVATE PSYCH

## 2022-09-09 PROCEDURE — 87636 SARSCOV2 & INF A&B AMP PRB: CPT

## 2022-09-09 PROCEDURE — 82962 GLUCOSE BLOOD TEST: CPT

## 2022-09-09 PROCEDURE — 99285 EMERGENCY DEPT VISIT HI MDM: CPT

## 2022-09-09 RX ORDER — NITROGLYCERIN 0.4 MG/1
0.4 TABLET SUBLINGUAL AS NEEDED
Status: DISCONTINUED | OUTPATIENT
Start: 2022-09-09 | End: 2022-09-15 | Stop reason: HOSPADM

## 2022-09-09 RX ORDER — LORAZEPAM 1 MG/1
2 TABLET ORAL ONCE
Status: COMPLETED | OUTPATIENT
Start: 2022-09-09 | End: 2022-09-09

## 2022-09-09 RX ORDER — CARVEDILOL 3.12 MG/1
3.12 TABLET ORAL EVERY 12 HOURS
Status: DISCONTINUED | OUTPATIENT
Start: 2022-09-09 | End: 2022-09-15 | Stop reason: HOSPADM

## 2022-09-09 RX ORDER — INSULIN GLARGINE 100 [IU]/ML
38 INJECTION, SOLUTION SUBCUTANEOUS
Status: DISCONTINUED | OUTPATIENT
Start: 2022-09-09 | End: 2022-09-12

## 2022-09-09 RX ORDER — LATANOPROST 50 UG/ML
1 SOLUTION/ DROPS OPHTHALMIC EVERY EVENING
Status: DISCONTINUED | OUTPATIENT
Start: 2022-09-09 | End: 2022-09-15 | Stop reason: HOSPADM

## 2022-09-09 RX ORDER — GABAPENTIN 100 MG/1
100 CAPSULE ORAL 3 TIMES DAILY
Status: DISCONTINUED | OUTPATIENT
Start: 2022-09-09 | End: 2022-09-10

## 2022-09-09 RX ORDER — FAMOTIDINE 20 MG/1
20 TABLET, FILM COATED ORAL 2 TIMES DAILY
Status: DISCONTINUED | OUTPATIENT
Start: 2022-09-09 | End: 2022-09-15 | Stop reason: HOSPADM

## 2022-09-09 RX ORDER — POTASSIUM CHLORIDE 750 MG/1
10 TABLET, EXTENDED RELEASE ORAL DAILY
Status: DISCONTINUED | OUTPATIENT
Start: 2022-09-10 | End: 2022-09-15 | Stop reason: HOSPADM

## 2022-09-09 RX ORDER — CLOPIDOGREL BISULFATE 75 MG/1
75 TABLET ORAL DAILY
Status: DISCONTINUED | OUTPATIENT
Start: 2022-09-10 | End: 2022-09-15 | Stop reason: HOSPADM

## 2022-09-09 RX ORDER — TRAZODONE HYDROCHLORIDE 50 MG/1
25 TABLET ORAL
Status: DISCONTINUED | OUTPATIENT
Start: 2022-09-09 | End: 2022-09-10

## 2022-09-09 RX ORDER — ISOSORBIDE MONONITRATE 60 MG/1
60 TABLET, EXTENDED RELEASE ORAL DAILY
Status: DISCONTINUED | OUTPATIENT
Start: 2022-09-10 | End: 2022-09-15 | Stop reason: HOSPADM

## 2022-09-09 RX ORDER — ATORVASTATIN CALCIUM 20 MG/1
80 TABLET, FILM COATED ORAL
Status: DISCONTINUED | OUTPATIENT
Start: 2022-09-09 | End: 2022-09-15 | Stop reason: HOSPADM

## 2022-09-09 RX ORDER — ALBUTEROL SULFATE 90 UG/1
1 AEROSOL, METERED RESPIRATORY (INHALATION)
Status: DISCONTINUED | OUTPATIENT
Start: 2022-09-09 | End: 2022-09-15 | Stop reason: HOSPADM

## 2022-09-09 RX ORDER — MELATONIN
3000 DAILY
Status: DISCONTINUED | OUTPATIENT
Start: 2022-09-10 | End: 2022-09-10

## 2022-09-09 RX ADMIN — ATORVASTATIN CALCIUM 80 MG: 20 TABLET, FILM COATED ORAL at 23:47

## 2022-09-09 RX ADMIN — GABAPENTIN 100 MG: 100 CAPSULE ORAL at 23:48

## 2022-09-09 RX ADMIN — LORAZEPAM 2 MG: 1 TABLET ORAL at 12:30

## 2022-09-09 RX ADMIN — CARVEDILOL 3.12 MG: 3.12 TABLET, FILM COATED ORAL at 23:48

## 2022-09-09 RX ADMIN — TRAZODONE HYDROCHLORIDE 25 MG: 50 TABLET ORAL at 23:49

## 2022-09-09 RX ADMIN — FAMOTIDINE 20 MG: 20 TABLET ORAL at 23:47

## 2022-09-09 NOTE — ED PROVIDER NOTES
EMERGENCY DEPARTMENT HISTORY AND PHYSICAL EXAM      Date: 9/9/2022  Patient Name: Sheila Gibson      History of Presenting Illness     Chief Complaint   Patient presents with    Suicidal       History Provided By: Patient    Location/Duration/Severity/Modifying factors   Patient is a 65-year-old male who presents with a chief complaint of suicidal ideations. Patient states for the last several days he has been feeling increasingly suicidal without a plan. He states he has been smoking crack cocaine heavily over that timeframe. Denies any somatic symptoms. He does not really complain of being short of breath but feels like the crackle pains been affecting his lungs. Denies any chest pain or shortness of breath. He is otherwise been in his usual state of health. He states that he only uses crack cocaine does not really drink alcohol or use any other associated substances. No fevers or chills. No vomiting or diarrhea. Suicidal  Pertinent negatives include no chest pain, no abdominal pain, no headaches and no shortness of breath. There are no other complaints, changes, or physical findings at this time.     PCP: Zachary English MD    Current Facility-Administered Medications   Medication Dose Route Frequency Provider Last Rate Last Admin    insulin lispro (HUMALOG) injection   SubCUTAneous AC&HS Pa Donnelly MD        glucose chewable tablet 16 g  16 g Oral PRN Pa Donnelly MD        glucagon (GLUCAGEN) injection 1 mg  1 mg IntraMUSCular PRN Pa Donnelly MD        dextrose 10% infusion 0-250 mL  0-250 mL IntraVENous PRN Pa Donnelly MD        carvediloL (COREG) tablet 3.125 mg  3.125 mg Oral Q12H Pa Donnelly MD   3.125 mg at 09/10/22 1008    clopidogreL (PLAVIX) tablet 75 mg  75 mg Oral DAILY Pa Donnelly MD   75 mg at 09/10/22 1008    famotidine (PEPCID) tablet 20 mg  20 mg Oral BID Pa Donnelly MD   20 mg at 09/10/22 1008    atorvastatin (LIPITOR) tablet 80 mg  80 mg Oral QHS Tia Gastelum MD   80 mg at 09/09/22 2347    potassium chloride (KLOR-CON M10) tablet 10 mEq  10 mEq Oral DAILY Tia Gastelum MD   10 mEq at 09/10/22 1007    nitroglycerin (NITROSTAT) tablet 0.4 mg  0.4 mg SubLINGual PRN Tia Gastelum MD        isosorbide mononitrate ER (IMDUR) tablet 60 mg  60 mg Oral DAILY Tia Gastelum MD   60 mg at 09/10/22 1007    latanoprost (XALATAN) 0.005 % ophthalmic solution 1 Drop  1 Drop Both Eyes QPM Tia Gastelum MD        albuterol (PROVENTIL HFA, VENTOLIN HFA, PROAIR HFA) inhaler 1 Puff  1 Puff Inhalation QID PRN Tia Gastelum MD        insulin glargine (LANTUS) injection 38 Units  38 Units SubCUTAneous QHS Tia Gastelum MD   38 Units at 09/10/22 0009    traZODone (DESYREL) tablet 25 mg  25 mg Oral QHS PRN Tia Gastelum MD   25 mg at 09/09/22 2349    gabapentin (NEURONTIN) capsule 100 mg  100 mg Oral TID Tia Gastelum MD   100 mg at 09/10/22 1008    cholecalciferol (VITAMIN D3) (1000 Units /25 mcg) tablet 3,000 Units  3,000 Units Oral DAILY Tia Gastelum MD   3,000 Units at 09/10/22 1007       Past History     Past Medical History:  Past Medical History:   Diagnosis Date    Arthritis     Depression     Diabetes ([facility] [address]     ETOH abuse     Psychiatric disorder     PTSD       Past Surgical History:  No past surgical history on file. Family History:  No family history on file. Social History:  Social History     Tobacco Use    Smoking status: Former     Packs/day: 1.00     Types: Cigarettes    Smokeless tobacco: Never   Substance Use Topics    Alcohol use: Not Currently    Drug use: Not Currently     Types: Cocaine     Comment: crack cocaine last used in April 2021       Allergies: Allergies   Allergen Reactions    Iodine Unknown (comments)    Shellfish Derived Unknown (comments)         Review of Systems     Review of Systems   Constitutional:  Negative for fever.    HENT:  Negative for congestion and rhinorrhea. Eyes:  Negative for visual disturbance. Respiratory:  Negative for cough and shortness of breath. Cardiovascular:  Negative for chest pain. Gastrointestinal:  Negative for abdominal pain, diarrhea, nausea and vomiting. Genitourinary:  Negative for dysuria, frequency and urgency. Musculoskeletal:  Negative for myalgias. Skin:  Negative for rash. Neurological:  Negative for headaches. Psychiatric/Behavioral:  Positive for dysphoric mood and suicidal ideas. The patient is nervous/anxious. Physical Exam     Physical Exam  Constitutional:       General: He is not in acute distress. Appearance: Normal appearance. He is normal weight. He is not ill-appearing or toxic-appearing. Comments: Slightly hyperactive. Not in overt distress. HENT:      Head: Normocephalic and atraumatic. Right Ear: External ear normal.      Left Ear: External ear normal.      Nose: Nose normal. No congestion or rhinorrhea. Mouth/Throat:      Mouth: Mucous membranes are moist.      Pharynx: No oropharyngeal exudate or posterior oropharyngeal erythema. Eyes:      Conjunctiva/sclera: Conjunctivae normal.      Pupils: Pupils are equal, round, and reactive to light. Cardiovascular:      Rate and Rhythm: Normal rate and regular rhythm. Pulses: Normal pulses. Heart sounds: Normal heart sounds. No murmur heard. No friction rub. Pulmonary:      Effort: Pulmonary effort is normal.      Breath sounds: Normal breath sounds. No wheezing, rhonchi or rales. Abdominal:      General: Abdomen is flat. Tenderness: There is no abdominal tenderness. There is no guarding or rebound. Musculoskeletal:         General: No swelling or tenderness. Normal range of motion. Cervical back: Normal range of motion and neck supple. Right lower leg: No edema. Left lower leg: No edema. Comments: Legs are very restless.   No focal deficits   Skin:     General: Skin is warm and dry. Capillary Refill: Capillary refill takes less than 2 seconds. Findings: No rash. Neurological:      General: No focal deficit present. Mental Status: He is alert. Motor: No weakness. Psychiatric:         Mood and Affect: Mood is anxious and depressed. Affect is not labile or flat. Behavior: Behavior is hyperactive. Lab and Diagnostic Study Results     Labs -  Recent Results (from the past 24 hour(s))   GLUCOSE, POC    Collection Time: 09/09/22 11:52 PM   Result Value Ref Range    Glucose (POC) 122 (H) 70 - 110 mg/dL   GLUCOSE, POC    Collection Time: 09/10/22  6:22 AM   Result Value Ref Range    Glucose (POC) 86 70 - 110 mg/dL   TSH 3RD GENERATION    Collection Time: 09/10/22  6:56 AM   Result Value Ref Range    TSH 0.68 0.36 - 3.74 uIU/mL   LIPID PANEL    Collection Time: 09/10/22  6:56 AM   Result Value Ref Range    LIPID PROFILE          Cholesterol, total 111 <200 MG/DL    Triglyceride 95 <150 MG/DL    HDL Cholesterol 46 40 - 60 MG/DL    LDL, calculated 46 0 - 100 MG/DL    VLDL, calculated 19 MG/DL    CHOL/HDL Ratio 2.4 0 - 5.0     HEMOGLOBIN A1C W/O EAG    Collection Time: 09/10/22  6:56 AM   Result Value Ref Range    Hemoglobin A1c 6.0 (H) 4.2 - 5.6 %   GLUCOSE, FASTING    Collection Time: 09/10/22  6:56 AM   Result Value Ref Range    Glucose 85 74 - 99 MG/DL   IRON PROFILE    Collection Time: 09/10/22  6:56 AM   Result Value Ref Range    Iron 37 (L) 50 - 175 ug/dL    TIBC 188 (L) 250 - 450 ug/dL    Iron % saturation 20 20 - 50 %         Radiologic Studies -   No orders to display         Medical Decision Making and ED Course   - I am the first and primary provider for this patient AND AM THE PRIMARY PROVIDER OF RECORD. - I reviewed the vital signs, available nursing notes, past medical history, past surgical history, family history and social history. - Initial assessment performed.  The patients presenting problems have been discussed, and the staff are in agreement with the care plan formulated and outlined with them. I have encouraged them to ask questions as they arise throughout their visit. Vital Signs-Reviewed the patient's vital signs. Patient Vitals for the past 12 hrs:   Temp Pulse Resp BP SpO2   09/09/22 2356 97.1 °F (36.2 °C) 97 18 122/67 97 %   09/09/22 2348 -- 97 -- 122/67 --       EKG interpretation: none    Records Reviewed: Nursing Notes, Old Medical Records, Previous Radiology Studies, and Previous Laboratory Studies    Provider Notes (Medical Decision Making):   MDM  Number of Diagnoses or Management Options  Crack cocaine use  Suicidal ideation  Diagnosis management comments: Patient is a 79-year-old male who presents to the emergency department with a chief complaint of suicidal ideations and crack cocaine use. He does not have any somatic symptoms at this time. Check his labs no sign of an ELISABET which I consider a possibility given his crack cocaine use although it seems he is escape at this time. We will continue with medical screening and contact the crisis team for evaluation      Differential includes major depressive disorder, psychotic disorder, schizophrenia, bipolar disorder, emotional crisis, malingering, generalized anxiety disorder, schizoaffective disorder. Medical screening/medical clearance complete. The patient would like to be seen by crisis team.    Notified by the crisis team that the patient will be admitted for continued psychiatric care. ED Course:   ED Course as of 09/10/22 1055   Fri Sep 09, 2022   1400 Medically cleared, crisis to see. Pending COVID test [FATUMA]   7171 The plan for this patient is to place him here at [facility] after discussion with crisis.  [FATUMA]      ED Course User Index  [FATUMA] Michela Velazco, DO   ------------------------------------------------------------------------------------------------------------        Consultations:       Consultations: - NONE        Procedures and Critical Care       Performed by: Rafat Oliveros DO    Procedures             CRITICAL CARE NOTE :  12:02 PM  CRITICAL CARE TIME: none    Rafat Oliveros DO        Disposition         Diagnosis:   1. Suicidal ideation    2. Crack cocaine use          Disposition: Admitted to psychiatry    Follow-up Information    None         Current Discharge Medication List        CONTINUE these medications which have NOT CHANGED    Details   carvedilol (COREG) 6.25 mg tablet Take 3.125 mg by mouth two (2) times a day. atorvastatin (LIPITOR) 80 mg tablet Take 80 mg by mouth nightly. furosemide (LASIX) 20 mg tablet Take 20 mg by mouth daily. insulin glargine (LANTUS,BASAGLAR) 100 unit/mL (3 mL) inpn 38 Units by SubCUTAneous route nightly. traZODone (DESYREL) 50 mg tablet Take 50 mg by mouth nightly. lidocaine (Lidoderm) 5 % Apply patch to the affected area for 12 hours a day and remove for 12 hours a day. Qty: 14 Each, Refills: 0      acetaminophen (TYLENOL) 500 mg tablet Take 2 Tablets by mouth every six (6) hours as needed for Pain. Qty: 24 Tablet, Refills: 0      cholecalciferol (VITAMIN D3) (1000 Units /25 mcg) tablet Take 3,000 Units by mouth daily. amitriptyline (ELAVIL) 25 mg tablet Take 25 mg by mouth nightly. clopidogrel (PLAVIX) 75 mg tab Take 75 mg by mouth daily. pregabalin (LYRICA) 150 mg capsule Take 150 mg by mouth two (2) times a day. Omeprazole delayed release (PRILOSEC D/R) 20 mg tablet Take 20 mg by mouth daily. potassium chloride (K-DUR, KLOR-CON) 20 mEq tablet Take 10 mEq by mouth daily. empagliflozin (JARDIANCE) 10 mg tablet Take 10 mg by mouth every morning. Comments: prescribed by Dr. Saurabh Perez      cyanocobalamin (VITAMIN B12) 500 mcg tablet Take 100 mcg by mouth daily.       nitroglycerin (NITROSTAT) 0.4 mg SL tablet 0.4 mg by SubLINGual route every five (5) minutes as needed for Chest Pain.      isosorbide mononitrate ER (IMDUR) 60 mg CR tablet Take 30 mg by mouth every morning. fluticasone propionate (FLONASE) 50 mcg/actuation nasal spray 2 Sprays by Both Nostrils route daily as needed for Allergies or Rhinitis.      loratadine (CLARITIN) 10 mg tablet Take 10 mg by mouth daily as needed for Allergies or Rhinitis.      lidocaine-prilocaine (EMLA) topical cream Apply 1 Each to affected area as needed for Pain (foot). diclofenac (VOLTAREN) 1 % gel Apply 2 g to affected area daily. latanoprost (XALATAN) 0.005 % ophthalmic solution Administer 1 Drop to both eyes nightly. brinzolamide-brimonidine 1-0.2 % drps Administer 1 Drop to both eyes three (3) times daily. ammonium lactate (AMLACTIN) 12 % topical cream apply to the affected area      insulin aspart U-100 (NOVOLOG) 100 unit/mL (3 mL) inpn 8 Units by SubCUTAneous route Before breakfast, lunch, and dinner. ipratropium-albuteroL (COMBIVENT RESPIMAT)  mcg/actuation inhaler Take 1 Puff by inhalation four (4) times daily. albuterol (PROVENTIL HFA, VENTOLIN HFA, PROAIR HFA) 90 mcg/actuation inhaler Take 2 Puffs by inhalation every four (4) hours as needed for Shortness of Breath. aspirin 81 mg chewable tablet Take 1 Tab by mouth daily. Qty: 30 Tab, Refills: 0      oxyCODONE-acetaminophen (PERCOCET) 5-325 mg per tablet Take 2 Tabs by mouth every six (6) hours as needed for Pain. Qty: 30 Tab, Refills: 0      gabapentin (NEURONTIN) 100 mg capsule Take 100 mg by mouth three (3) times daily. FLUoxetine (PROZAC) 10 mg capsule Take 10 mg by mouth daily. Diagnosis     Clinical Impression:   1. Suicidal ideation    2. Crack cocaine use        Attestations:    Kim Heath, DO    Please note that this dictation was completed with [employer], the [employer] voice recognition software. Quite often unanticipated grammatical, syntax, homophones, and other interpretive errors are inadvertently transcribed by the computer software. Please disregard these errors.   Please excuse any errors that have escaped final proofreading. Thank you.

## 2022-09-09 NOTE — BSMART NOTE
Behavioral Health Crisis Assessment    Chief Complaint:   \"Feeling suicidal.\"          Voluntary or Involuntary Status:Voluntary. Voluntary      C-SSRS current suicide Risk (High, Moderate, Low): High      Past Suicide Attempts:  (specify): Patient denies      Self Injurious/Self Mutilation behaviors (specify): Patient denies      Protective Factors (specify): Wife       Risk Factors (specify) Substance use      Substance use (current or past): Crack cocaine      [facility]      Violence towards others (current and/or past:(specify): Patient denies      Legal issues (current or past) Patient denies. Access to weapons: Patient denies. Trauma or Abuse: (specify)Yes, \"im a [employer]. \"      Living Situation: Patient currently lives with wife. Employment:Patient is a disabled . Education level: Patient has 12th grade education. Brief Clinical Summary: Patient is a 50-year-old [facility] American male. Patient comes to the hospital with complaints of suicidal ideations. \"I been feeling suicidal since [place] war. \" Patient has a plan to overdose on drugs. Patient is also endorsing auditory and visual hallucinations. Patient is hearing voices telling him to kill himself. Patient reports having flashbacks of \"fire fights\" form the war. Patient reports being referred to the hospital after calling the [place] crisis hotline. Patient reported informing them her was feeling suicidal and being referred to this hospital for treatment. Patient informed crisis he has been on a 4 dy crack binge That reports having some health concerns as a result. He reported chest pains, not sleeping for 4 days, and not eating for 4 days as well. Patient does not report any other issues or concerns at this time.        Disposition: Patient case was presented to  on call psychiatrist for treatment. Patient ws accepted for inpatient treatment. Emergency room doctor and nurse made aware.

## 2022-09-09 NOTE — ED NOTES
Assumed care of patient from [facility]. Patient resting on stretcher with eyes closed. NAD.

## 2022-09-09 NOTE — ED TRIAGE NOTES
Per client, reports being SI with plan to overdose of oral medicationf. Client admits to using crack cocaine continuously over last 3 days. Client reports just being tired of life. NAD. AXOX4.

## 2022-09-10 PROBLEM — J44.9 COPD (CHRONIC OBSTRUCTIVE PULMONARY DISEASE) (HCC): Chronic | Status: ACTIVE | Noted: 2022-09-10

## 2022-09-10 PROBLEM — K21.9 GERD (GASTROESOPHAGEAL REFLUX DISEASE): Chronic | Status: ACTIVE | Noted: 2022-09-10

## 2022-09-10 PROBLEM — I10 HYPERTENSION: Chronic | Status: ACTIVE | Noted: 2022-09-10

## 2022-09-10 PROBLEM — M51.36 DEGENERATIVE DISC DISEASE, LUMBAR: Status: ACTIVE | Noted: 2022-09-10

## 2022-09-10 PROBLEM — K21.9 GERD (GASTROESOPHAGEAL REFLUX DISEASE): Status: ACTIVE | Noted: 2022-09-10

## 2022-09-10 PROBLEM — F33.1 MODERATE EPISODE OF RECURRENT MAJOR DEPRESSIVE DISORDER (HCC): Status: ACTIVE | Noted: 2022-09-09

## 2022-09-10 PROBLEM — M51.36 DEGENERATIVE DISC DISEASE, LUMBAR: Chronic | Status: ACTIVE | Noted: 2022-09-10

## 2022-09-10 PROBLEM — F10.20 ALCOHOL USE DISORDER, SEVERE, DEPENDENCE (HCC): Chronic | Status: ACTIVE | Noted: 2022-09-10

## 2022-09-10 PROBLEM — F10.20 ALCOHOL USE DISORDER, SEVERE, DEPENDENCE (HCC): Status: ACTIVE | Noted: 2022-09-10

## 2022-09-10 PROBLEM — I50.9 CHF (CONGESTIVE HEART FAILURE) (HCC): Status: ACTIVE | Noted: 2022-09-10

## 2022-09-10 PROBLEM — H40.9 GLAUCOMA: Status: ACTIVE | Noted: 2022-09-10

## 2022-09-10 PROBLEM — F14.20 COCAINE USE DISORDER, SEVERE, DEPENDENCE (HCC): Chronic | Status: ACTIVE | Noted: 2022-09-10

## 2022-09-10 PROBLEM — F14.20 COCAINE USE DISORDER, SEVERE, DEPENDENCE (HCC): Status: ACTIVE | Noted: 2022-09-10

## 2022-09-10 PROBLEM — F33.1 MODERATE EPISODE OF RECURRENT MAJOR DEPRESSIVE DISORDER (HCC): Chronic | Status: ACTIVE | Noted: 2022-09-09

## 2022-09-10 PROBLEM — M51.369 DEGENERATIVE DISC DISEASE, LUMBAR: Status: ACTIVE | Noted: 2022-09-10

## 2022-09-10 PROBLEM — F43.12 CHRONIC POST-TRAUMATIC STRESS DISORDER (PTSD) AFTER MILITARY COMBAT: Chronic | Status: ACTIVE | Noted: 2022-09-10

## 2022-09-10 PROBLEM — E11.9 TYPE II DIABETES MELLITUS (HCC): Chronic | Status: ACTIVE | Noted: 2022-09-10

## 2022-09-10 PROBLEM — E11.9 TYPE II DIABETES MELLITUS (HCC): Status: ACTIVE | Noted: 2022-09-10

## 2022-09-10 PROBLEM — H40.9 GLAUCOMA: Chronic | Status: ACTIVE | Noted: 2022-09-10

## 2022-09-10 PROBLEM — I50.9 CHF (CONGESTIVE HEART FAILURE) (HCC): Chronic | Status: ACTIVE | Noted: 2022-09-10

## 2022-09-10 PROBLEM — J44.9 COPD (CHRONIC OBSTRUCTIVE PULMONARY DISEASE) (HCC): Status: ACTIVE | Noted: 2022-09-10

## 2022-09-10 PROBLEM — F43.12 CHRONIC POST-TRAUMATIC STRESS DISORDER (PTSD) AFTER MILITARY COMBAT: Status: ACTIVE | Noted: 2022-09-10

## 2022-09-10 PROBLEM — I10 HYPERTENSION: Status: ACTIVE | Noted: 2022-09-10

## 2022-09-10 LAB
CHOLEST SERPL-MCNC: 111 MG/DL
GLUCOSE BLD STRIP.AUTO-MCNC: 160 MG/DL (ref 70–110)
GLUCOSE BLD STRIP.AUTO-MCNC: 162 MG/DL (ref 70–110)
GLUCOSE BLD STRIP.AUTO-MCNC: 245 MG/DL (ref 70–110)
GLUCOSE BLD STRIP.AUTO-MCNC: 86 MG/DL (ref 70–110)
GLUCOSE P FAST SERPL-MCNC: 85 MG/DL (ref 74–99)
HBA1C MFR BLD: 6 % (ref 4.2–5.6)
HDLC SERPL-MCNC: 46 MG/DL (ref 40–60)
HDLC SERPL: 2.4 {RATIO} (ref 0–5)
IRON SATN MFR SERPL: 20 % (ref 20–50)
IRON SERPL-MCNC: 37 UG/DL (ref 50–175)
LDLC SERPL CALC-MCNC: 46 MG/DL (ref 0–100)
LIPID PROFILE,FLP: NORMAL
TIBC SERPL-MCNC: 188 UG/DL (ref 250–450)
TRIGL SERPL-MCNC: 95 MG/DL (ref ?–150)
TSH SERPL DL<=0.05 MIU/L-ACNC: 0.68 UIU/ML (ref 0.36–3.74)
VLDLC SERPL CALC-MCNC: 19 MG/DL

## 2022-09-10 PROCEDURE — 74011250637 HC RX REV CODE- 250/637: Performed by: PSYCHIATRY & NEUROLOGY

## 2022-09-10 PROCEDURE — 36415 COLL VENOUS BLD VENIPUNCTURE: CPT

## 2022-09-10 PROCEDURE — 84443 ASSAY THYROID STIM HORMONE: CPT

## 2022-09-10 PROCEDURE — 65220000003 HC RM SEMIPRIVATE PSYCH

## 2022-09-10 PROCEDURE — 83540 ASSAY OF IRON: CPT

## 2022-09-10 PROCEDURE — 82962 GLUCOSE BLOOD TEST: CPT

## 2022-09-10 PROCEDURE — 82947 ASSAY GLUCOSE BLOOD QUANT: CPT

## 2022-09-10 PROCEDURE — 99222 1ST HOSP IP/OBS MODERATE 55: CPT | Performed by: PSYCHIATRY & NEUROLOGY

## 2022-09-10 PROCEDURE — 74011636637 HC RX REV CODE- 636/637: Performed by: PSYCHIATRY & NEUROLOGY

## 2022-09-10 PROCEDURE — 83036 HEMOGLOBIN GLYCOSYLATED A1C: CPT

## 2022-09-10 PROCEDURE — 93005 ELECTROCARDIOGRAM TRACING: CPT

## 2022-09-10 PROCEDURE — 74011000250 HC RX REV CODE- 250: Performed by: PSYCHIATRY & NEUROLOGY

## 2022-09-10 PROCEDURE — 80061 LIPID PANEL: CPT

## 2022-09-10 RX ORDER — AMITRIPTYLINE HYDROCHLORIDE 25 MG/1
25 TABLET, FILM COATED ORAL
Status: DISCONTINUED | OUTPATIENT
Start: 2022-09-10 | End: 2022-09-13

## 2022-09-10 RX ORDER — DEXTROSE MONOHYDRATE 100 MG/ML
0-250 INJECTION, SOLUTION INTRAVENOUS AS NEEDED
Status: DISCONTINUED | OUTPATIENT
Start: 2022-09-10 | End: 2022-09-15 | Stop reason: HOSPADM

## 2022-09-10 RX ORDER — INSULIN LISPRO 100 [IU]/ML
INJECTION, SOLUTION INTRAVENOUS; SUBCUTANEOUS
Status: DISCONTINUED | OUTPATIENT
Start: 2022-09-10 | End: 2022-09-15 | Stop reason: HOSPADM

## 2022-09-10 RX ORDER — PREGABALIN 50 MG/1
150 CAPSULE ORAL 2 TIMES DAILY
Status: DISCONTINUED | OUTPATIENT
Start: 2022-09-10 | End: 2022-09-15 | Stop reason: HOSPADM

## 2022-09-10 RX ORDER — ACETAMINOPHEN 325 MG/1
650 TABLET ORAL
Status: DISCONTINUED | OUTPATIENT
Start: 2022-09-10 | End: 2022-09-15 | Stop reason: HOSPADM

## 2022-09-10 RX ORDER — MAGNESIUM SULFATE 100 %
16 CRYSTALS MISCELLANEOUS AS NEEDED
Status: DISCONTINUED | OUTPATIENT
Start: 2022-09-10 | End: 2022-09-15 | Stop reason: HOSPADM

## 2022-09-10 RX ORDER — FUROSEMIDE 20 MG/1
20 TABLET ORAL DAILY
Status: DISCONTINUED | OUTPATIENT
Start: 2022-09-11 | End: 2022-09-15 | Stop reason: HOSPADM

## 2022-09-10 RX ORDER — MELATONIN
2000 DAILY
Status: DISCONTINUED | OUTPATIENT
Start: 2022-09-11 | End: 2022-09-15 | Stop reason: HOSPADM

## 2022-09-10 RX ORDER — ADHESIVE BANDAGE
30 BANDAGE TOPICAL DAILY PRN
Status: DISCONTINUED | OUTPATIENT
Start: 2022-09-10 | End: 2022-09-15 | Stop reason: HOSPADM

## 2022-09-10 RX ORDER — FLUOXETINE 10 MG/1
10 CAPSULE ORAL DAILY
Status: DISCONTINUED | OUTPATIENT
Start: 2022-09-11 | End: 2022-09-12

## 2022-09-10 RX ADMIN — MAGNESIUM HYDROXIDE 30 ML: 400 SUSPENSION ORAL at 15:33

## 2022-09-10 RX ADMIN — CARVEDILOL 3.12 MG: 3.12 TABLET, FILM COATED ORAL at 20:24

## 2022-09-10 RX ADMIN — ATORVASTATIN CALCIUM 80 MG: 20 TABLET, FILM COATED ORAL at 21:14

## 2022-09-10 RX ADMIN — FAMOTIDINE 20 MG: 20 TABLET ORAL at 10:08

## 2022-09-10 RX ADMIN — CHOLECALCIFEROL TAB 25 MCG (1000 UNIT) 3000 UNITS: 25 TAB at 10:07

## 2022-09-10 RX ADMIN — FAMOTIDINE 20 MG: 20 TABLET ORAL at 17:00

## 2022-09-10 RX ADMIN — CARVEDILOL 3.12 MG: 3.12 TABLET, FILM COATED ORAL at 10:08

## 2022-09-10 RX ADMIN — LATANOPROST 1 DROP: 50 SOLUTION OPHTHALMIC at 17:00

## 2022-09-10 RX ADMIN — CLOPIDOGREL BISULFATE 75 MG: 75 TABLET ORAL at 10:08

## 2022-09-10 RX ADMIN — INSULIN GLARGINE 38 UNITS: 100 INJECTION, SOLUTION SUBCUTANEOUS at 00:09

## 2022-09-10 RX ADMIN — PREGABALIN 150 MG: 50 CAPSULE ORAL at 20:24

## 2022-09-10 RX ADMIN — GABAPENTIN 100 MG: 100 CAPSULE ORAL at 10:08

## 2022-09-10 RX ADMIN — INSULIN GLARGINE 38 UNITS: 100 INJECTION, SOLUTION SUBCUTANEOUS at 21:14

## 2022-09-10 RX ADMIN — ISOSORBIDE MONONITRATE 60 MG: 60 TABLET, EXTENDED RELEASE ORAL at 10:07

## 2022-09-10 RX ADMIN — POTASSIUM CHLORIDE 10 MEQ: 750 TABLET, EXTENDED RELEASE ORAL at 10:07

## 2022-09-10 RX ADMIN — ACETAMINOPHEN 650 MG: 325 TABLET, FILM COATED ORAL at 15:23

## 2022-09-10 RX ADMIN — ALBUTEROL SULFATE 1 PUFF: 108 AEROSOL, METERED RESPIRATORY (INHALATION) at 18:15

## 2022-09-10 RX ADMIN — AMITRIPTYLINE HYDROCHLORIDE 25 MG: 25 TABLET, FILM COATED ORAL at 20:24

## 2022-09-10 NOTE — PROGRESS NOTES
Pt has been isolative today but is calm and pleasant. Pt complains of constipation, his last BM was 3 days ago. PRN milk of mag was given. Pt also complains of pain, Tylenol was given. Pt continues to refused his insulin despite him needing coverage. Dinner BS was 170. Pt states he only takes 1 unit, once a week. Pt continues to believe this even after education from doctor and writer.

## 2022-09-10 NOTE — BH NOTES
Voluntary pt transferred from ED to [address] [place] [address] via wheelchair with his belongings which were searched and inventoried. Pt reported that he has reading glasses, these glasses were not with his belonging. ED was called regarding pt's reading glasses but they did not answer the phone. Pt presents depressed w/ congruent affect. Currently denies si/hi and avh and does cfs. Reports previous inpt psych admission at the [facility] in [place], and requested to be transferred to the [place]. Pt was informed to discuss this matter w/ psychiatrist tomorrow. [facility] war . Endorses daily crack cocaine use. Currently lives with his wife. Pt was given a walker d/t unsteady gait. Significant medical hx, DM II, HTN, CAD, CABG x4 in 2016, COPD, MI, OA, and community acquired pneumonia, per chart review. Pt was cooperative w/ admission assessment. Tx process was reviewed with pt and he was oriented to the unit.     RNS WILL INITIATE, DEVELOP, IMPLEMENT REVIEW OR REVISE TREATMENT PLAN

## 2022-09-10 NOTE — PROGRESS NOTES
Problem: Suicide  Goal: *STG: Remains safe in hospital  Description: AEB pt not harming self or others, every shift  Outcome: Progressing Towards Goal  Goal: *STG/LTG: Complies with medication therapy  Description: AEB pt taking all scheduled medications, every shift  Outcome: Progressing Towards Goal     Problem: Falls - Risk of  Goal: *Absence of Falls  Description: Document Issac Fall Risk and appropriate interventions in the flowsheet, every shift. Outcome: Progressing Towards Goal  Note: Fall Risk Interventions:  Mobility Interventions: Utilize walker, cane, or other assistive device         Medication Interventions: Teach patient to arise slowly       Pt is med and meal compliant. Pt still reports suicidal thoughts. Pt reports pain 10/10 from arthritis. Writer will ask physician for PRN order.   [facility]

## 2022-09-10 NOTE — BH NOTES
Patient was missing his glasses. Staff called ED to look for them. ED [facility]) called back and stated that the sitter put them with his belonging but the wife must have took them home. When patient arrived on the unit staff  check his belonging there were no glasses.

## 2022-09-10 NOTE — PROGRESS NOTES
Pt's blood glucose before lunch was 245. Writer stated that they would be back with insulin. Pt stated \"No, I only take insulin once a week. \"  Writer stated that his insulin is scheduled before meals and nightly. Writer educated pt on high blood sugar and importance of insulin but pt still refused.

## 2022-09-11 LAB
ATRIAL RATE: 96 BPM
CALCULATED P AXIS, ECG09: 50 DEGREES
CALCULATED R AXIS, ECG10: -6 DEGREES
CALCULATED T AXIS, ECG11: 27 DEGREES
DIAGNOSIS, 93000: NORMAL
GLUCOSE BLD STRIP.AUTO-MCNC: 112 MG/DL (ref 70–110)
GLUCOSE BLD STRIP.AUTO-MCNC: 146 MG/DL (ref 70–110)
GLUCOSE BLD STRIP.AUTO-MCNC: 85 MG/DL (ref 70–110)
P-R INTERVAL, ECG05: 142 MS
Q-T INTERVAL, ECG07: 376 MS
QRS DURATION, ECG06: 68 MS
QTC CALCULATION (BEZET), ECG08: 475 MS
VENTRICULAR RATE, ECG03: 96 BPM

## 2022-09-11 PROCEDURE — 74011250637 HC RX REV CODE- 250/637: Performed by: PSYCHIATRY & NEUROLOGY

## 2022-09-11 PROCEDURE — 65220000003 HC RM SEMIPRIVATE PSYCH

## 2022-09-11 PROCEDURE — 82962 GLUCOSE BLOOD TEST: CPT

## 2022-09-11 PROCEDURE — 74011636637 HC RX REV CODE- 636/637: Performed by: PSYCHIATRY & NEUROLOGY

## 2022-09-11 PROCEDURE — 99231 SBSQ HOSP IP/OBS SF/LOW 25: CPT | Performed by: PSYCHIATRY & NEUROLOGY

## 2022-09-11 RX ADMIN — FUROSEMIDE 20 MG: 20 TABLET ORAL at 08:26

## 2022-09-11 RX ADMIN — AMITRIPTYLINE HYDROCHLORIDE 25 MG: 25 TABLET, FILM COATED ORAL at 22:25

## 2022-09-11 RX ADMIN — INSULIN GLARGINE 38 UNITS: 100 INJECTION, SOLUTION SUBCUTANEOUS at 21:40

## 2022-09-11 RX ADMIN — ACETAMINOPHEN 650 MG: 325 TABLET, FILM COATED ORAL at 10:23

## 2022-09-11 RX ADMIN — FLUOXETINE 10 MG: 10 CAPSULE ORAL at 08:26

## 2022-09-11 RX ADMIN — PREGABALIN 150 MG: 50 CAPSULE ORAL at 08:27

## 2022-09-11 RX ADMIN — CHOLECALCIFEROL TAB 25 MCG (1000 UNIT) 2000 UNITS: 25 TAB at 08:26

## 2022-09-11 RX ADMIN — PREGABALIN 150 MG: 50 CAPSULE ORAL at 21:40

## 2022-09-11 RX ADMIN — CLOPIDOGREL BISULFATE 75 MG: 75 TABLET ORAL at 08:26

## 2022-09-11 RX ADMIN — CARVEDILOL 3.12 MG: 3.12 TABLET, FILM COATED ORAL at 22:22

## 2022-09-11 RX ADMIN — FAMOTIDINE 20 MG: 20 TABLET ORAL at 08:27

## 2022-09-11 RX ADMIN — POTASSIUM CHLORIDE 10 MEQ: 750 TABLET, EXTENDED RELEASE ORAL at 08:26

## 2022-09-11 RX ADMIN — ATORVASTATIN CALCIUM 80 MG: 20 TABLET, FILM COATED ORAL at 21:40

## 2022-09-11 RX ADMIN — ACETAMINOPHEN 650 MG: 325 TABLET, FILM COATED ORAL at 21:46

## 2022-09-11 NOTE — PROGRESS NOTES
Problem: Suicide  Goal: *STG: Remains safe in hospital  Description: AEB pt not harming self or others, every shift  Outcome: Progressing Towards Goal  Goal: *STG: Seeks staff when feelings of self harm or harm towards others arise  Description: AEB pt seeking staff when feelings of self harm or harm towards others arise, as needed  Outcome: Progressing Towards Goal  Goal: *STG: Attends activities and groups  Description: AEB pt attending 3 groups, daily  Outcome: Progressing Towards Goal  Goal: *STG/LTG: Complies with medication therapy  Description: AEB pt taking all scheduled medications, every shift  Outcome: Progressing Towards Goal  Goal: *STG/LTG: No longer expresses self destructive or suicidal thoughts  Description: AEB pt no longer expressing self destructive or suicidal thoughts, every shfit  Outcome: Progressing Towards Goal  Goal: Interventions  Outcome: Progressing Towards Goal     Problem: Crack/Cocaine Withdrawal  Goal: *STG: Will identify negative impact of chemical dependency including the use of tobacco, alcohol, and other substances  Description: AEB pt identifying 3 negative impacts of chemical dependency, prior to discharge  Outcome: Progressing Towards Goal  Goal: *STG: Verbalizes abstinence as an achievable goal  Description: AEB pt verbalizing abstinence as an achievable goal, prior to discharge  Outcome: Progressing Towards Goal  Goal: Interventions  Outcome: Progressing Towards Goal     Problem: Hypertension  Goal: *Blood pressure within specified parameters  Description: AEB pt bp being within specified parameters, daily  Outcome: Progressing Towards Goal

## 2022-09-11 NOTE — BH NOTES
Pt appeared to have slept for 4.50 hours thus far; needs assessed and met during the shift. He was overheard coughing often; \"yeah I smoked for years\". His gait has been unsteady; staff ensured that he did not fall, while standing and also educated him on utilizing his walker at all times to prevent falls. He was receptive to the education provided. Pt is quite pleasant upon approach. Will continue to monitor for safety and changes in behavior.
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
## 2022-09-12 LAB
GLUCOSE BLD STRIP.AUTO-MCNC: 117 MG/DL (ref 70–110)
GLUCOSE BLD STRIP.AUTO-MCNC: 170 MG/DL (ref 70–110)

## 2022-09-12 PROCEDURE — 99231 SBSQ HOSP IP/OBS SF/LOW 25: CPT | Performed by: PSYCHIATRY & NEUROLOGY

## 2022-09-12 PROCEDURE — 74011636637 HC RX REV CODE- 636/637: Performed by: PSYCHIATRY & NEUROLOGY

## 2022-09-12 PROCEDURE — 74011250637 HC RX REV CODE- 250/637: Performed by: PSYCHIATRY & NEUROLOGY

## 2022-09-12 PROCEDURE — 65220000003 HC RM SEMIPRIVATE PSYCH

## 2022-09-12 PROCEDURE — 82962 GLUCOSE BLOOD TEST: CPT

## 2022-09-12 RX ORDER — FLUOXETINE HYDROCHLORIDE 20 MG/1
20 CAPSULE ORAL DAILY
Status: DISCONTINUED | OUTPATIENT
Start: 2022-09-13 | End: 2022-09-15 | Stop reason: HOSPADM

## 2022-09-12 RX ORDER — INSULIN GLARGINE 100 [IU]/ML
30 INJECTION, SOLUTION SUBCUTANEOUS
Status: DISCONTINUED | OUTPATIENT
Start: 2022-09-12 | End: 2022-09-15 | Stop reason: HOSPADM

## 2022-09-12 RX ADMIN — POTASSIUM CHLORIDE 10 MEQ: 750 TABLET, EXTENDED RELEASE ORAL at 09:01

## 2022-09-12 RX ADMIN — AMITRIPTYLINE HYDROCHLORIDE 25 MG: 25 TABLET, FILM COATED ORAL at 20:54

## 2022-09-12 RX ADMIN — CARVEDILOL 3.12 MG: 3.12 TABLET, FILM COATED ORAL at 09:06

## 2022-09-12 RX ADMIN — ACETAMINOPHEN 650 MG: 325 TABLET, FILM COATED ORAL at 21:15

## 2022-09-12 RX ADMIN — PREGABALIN 150 MG: 50 CAPSULE ORAL at 20:53

## 2022-09-12 RX ADMIN — ALBUTEROL SULFATE 1 PUFF: 108 AEROSOL, METERED RESPIRATORY (INHALATION) at 10:09

## 2022-09-12 RX ADMIN — ACETAMINOPHEN 650 MG: 325 TABLET, FILM COATED ORAL at 10:07

## 2022-09-12 RX ADMIN — CARVEDILOL 3.12 MG: 3.12 TABLET, FILM COATED ORAL at 20:54

## 2022-09-12 RX ADMIN — PREGABALIN 150 MG: 50 CAPSULE ORAL at 09:06

## 2022-09-12 RX ADMIN — ISOSORBIDE MONONITRATE 60 MG: 60 TABLET, EXTENDED RELEASE ORAL at 09:06

## 2022-09-12 RX ADMIN — FUROSEMIDE 20 MG: 20 TABLET ORAL at 09:10

## 2022-09-12 RX ADMIN — INSULIN LISPRO 2 UNITS: 100 INJECTION, SOLUTION INTRAVENOUS; SUBCUTANEOUS at 20:52

## 2022-09-12 RX ADMIN — FLUOXETINE 10 MG: 10 CAPSULE ORAL at 09:06

## 2022-09-12 RX ADMIN — ATORVASTATIN CALCIUM 80 MG: 20 TABLET, FILM COATED ORAL at 21:00

## 2022-09-12 RX ADMIN — CLOPIDOGREL BISULFATE 75 MG: 75 TABLET ORAL at 09:07

## 2022-09-12 RX ADMIN — FAMOTIDINE 20 MG: 20 TABLET ORAL at 09:07

## 2022-09-12 RX ADMIN — CHOLECALCIFEROL TAB 25 MCG (1000 UNIT) 2000 UNITS: 25 TAB at 09:06

## 2022-09-12 RX ADMIN — INSULIN GLARGINE 30 UNITS: 100 INJECTION, SOLUTION SUBCUTANEOUS at 20:45

## 2022-09-12 RX ADMIN — MAGNESIUM HYDROXIDE 30 ML: 400 SUSPENSION ORAL at 14:03

## 2022-09-12 RX ADMIN — LATANOPROST 1 DROP: 50 SOLUTION OPHTHALMIC at 23:00

## 2022-09-12 NOTE — BH NOTES
Patient has been visible in the milieu and observed socializing with peers . Patient watched football game with peers in the Day Area. Patient walks with a steady gait and would make his needs known to staff. Patient's hours of sleep blood glucose level was 146, requiring no humalog coverage , per insulin sliding scale. Patient however , received 38 units lantus , at bed time. Denies suicidal and homicidal ideations, passive death wish, pain,auditory and visual hallucinations. Will continue to monitor. Nursing will continue to monitor patient for health and safety and provide support.
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
## 2022-09-12 NOTE — PROGRESS NOTES
Patient sitting in day are when this provider took over care. Voiced no complaints or concerns that were not addressed. C/O constipation, PRN MOM 30 ml PO given at 1400. Followed by water and then juice x 2. Compliant with medications  Tylenol 650 mg PO given for \"pain of being old\"    Is not sure what his d/c plans are but he lives at home with wife and they got into argument and he went to hotel and overdosed on xanax, flexeril and alcohol, wrote a suicide note. Reports having treatment at the [place] in [place] and indicates that they treat him well. Says he has been suicidal since he \"returned from [place]" and that will never change. Wants to go to [place] to complete treatment. 4 day crack cocaine binge concerns him at his age and he does not want to repeat this action. Denies SI/HI/AVH or any harmful intent  Safety maintained with Q 15 minute checks,  Continue to monitor safety  Continue treatment plan.        Problem: Suicide  Goal: *STG: Remains safe in hospital  Description: AEB pt not harming self or others, every shift  Outcome: Progressing Towards Goal     Problem: Suicide  Goal: *STG: Seeks staff when feelings of self harm or harm towards others arise  Description: AEB pt seeking staff when feelings of self harm or harm towards others arise, as needed  Outcome: Progressing Towards Goal   Problem: Crack/Cocaine Withdrawal  Goal: *STG: Verbalizes abstinence as an achievable goal  Description: AEB pt verbalizing abstinence as an achievable goal, prior to discharge  Outcome: Progressing Towards Goal
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
## 2022-09-12 NOTE — H&P
Psychiatry History and Physical    Subjective:     Date of Evaluation:  9/12/2022    Reason for Referral:  Kevin Barnhart was referred to the examiners from  ED for SI. History of Presenting Problem: Kevin Barnhart is a 64 yo M with PMH Depression, PTSD, DM, substance abuse who was admitted for SI. Denies HI and hallucinations. Tox screen positive for cocaine. COVID test negative. He denies nay physical complaints. Patient Active Problem List    Diagnosis Date Noted    Chronic post-traumatic stress 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 09/10/2022    Cocaine use disorder, severe, dependence ([facility] 09/10/2022    Alcohol use disorder, severe, dependence ([facility] 09/10/2022    Hypertension 09/10/2022    CHF (congestive heart failure) ([facility] 09/10/2022    COPD (chronic obstructive pulmonary disease) ([facility] 09/10/2022    GERD (gastroesophageal reflux disease) 09/10/2022    Glaucoma 09/10/2022    Degenerative disc disease, lumbar 09/10/2022    Type II diabetes mellitus ([facility] 09/10/2022    Moderate episode of recurrent major depressive disorder ([facility] 09/09/2022    CAP (community acquired pneumonia) 07/29/2019     Past Medical History:   Diagnosis Date    Arthritis     Depression     Diabetes ([facility]     ETOH abuse     Psychiatric disorder     PTSD     No past surgical history on file. No family history on file. Social History     Tobacco Use    Smoking status: Former     Packs/day: 1.00     Types: Cigarettes    Smokeless tobacco: Never   Substance Use Topics    Alcohol use: Not Currently     Prior to Admission medications    Medication Sig Start Date End Date Taking? Authorizing Provider   carvedilol (COREG) 6.25 mg tablet Take 3.125 mg by mouth two (2) times a day. 6/26/19  Yes Fahringer, [place], PA   atorvastatin (LIPITOR) 80 mg tablet Take 80 mg by mouth nightly. 4/10/19  Yes Fahringer, [place], PA   furosemide (LASIX) 20 mg tablet Take 20 mg by mouth daily.  5/1/19  Yes RY Rodríguez   insulin glargine (LANTUS,BASAGLAR) 100 unit/mL (3 mL) inpn 38 Units by SubCUTAneous route nightly. Yes Provider, Historical   traZODone (DESYREL) 50 mg tablet Take 50 mg by mouth nightly. Yes Other, MD Amanda   lidocaine (Lidoderm) 5 % Apply patch to the affected area for 12 hours a day and remove for 12 hours a day. 6/29/22   Con Neas, DO   acetaminophen (TYLENOL) 500 mg tablet Take 2 Tablets by mouth every six (6) hours as needed for Pain. 6/29/22   Con Neas, DO   cholecalciferol (VITAMIN D3) (1000 Units /25 mcg) tablet Take 3,000 Units by mouth daily. 5/31/19   Fahringer, Ardean Hermes, PA   amitriptyline (ELAVIL) 25 mg tablet Take 25 mg by mouth nightly. 5/14/19   Fahringer, Ardean Hermes, PA   clopidogrel (PLAVIX) 75 mg tab Take 75 mg by mouth daily. 4/29/19   Fahringer, Ardean Hermes, PA   pregabalin (LYRICA) 150 mg capsule Take 150 mg by mouth two (2) times a day. 7/12/19   Fahringer, Ardean Hermes, PA   Omeprazole delayed release (PRILOSEC D/R) 20 mg tablet Take 20 mg by mouth daily. 5/14/19   Fahringer, Ardean Hermes, PA   potassium chloride (K-DUR, KLOR-CON) 20 mEq tablet Take 10 mEq by mouth daily. 5/14/19   Fahringer, Ardean Hermes, PA   empagliflozin (JARDIANCE) 10 mg tablet Take 10 mg by mouth every morning. 5/14/19   Provider, Historical   cyanocobalamin (VITAMIN B12) 500 mcg tablet Take 100 mcg by mouth daily. 1/1/19   Fahringer, Ardean Hermes, PA   nitroglycerin (NITROSTAT) 0.4 mg SL tablet 0.4 mg by SubLINGual route every five (5) minutes as needed for Chest Pain. 6/19/19   Claudell Sofia, MD   isosorbide mononitrate ER (IMDUR) 60 mg CR tablet Take 30 mg by mouth every morning. 6/19/19   Claudell Sofia, MD   fluticasone propionate (FLONASE) 50 mcg/actuation nasal spray 2 Sprays by Both Nostrils route daily as needed for Allergies or Rhinitis. Fahringer, Ardean Hermes, [place]   loratadine (CLARITIN) 10 mg tablet Take 10 mg by mouth daily as needed for Allergies or Rhinitis.     Fahringer, Ardean Hermes, [place]   lidocaine-prilocaine (EMLA) topical cream Apply 1 Each to affected area as needed for Pain (foot). Fahringer, [place], [place]   diclofenac (VOLTAREN) 1 % gel Apply 2 g to affected area daily. Fahringer, [place], PA   latanoprost (XALATAN) 0.005 % ophthalmic solution Administer 1 Drop to both eyes nightly. 6/6/19   Nia Perry MD   brinzolamide-brimonidine 1-0.2 % drps Administer 1 Drop to both eyes three (3) times daily. Provider, Historical   ammonium lactate (AMLACTIN) 12 % topical cream apply to the affected area    Fahringer, [place], PA   insulin aspart U-100 (NOVOLOG) 100 unit/mL (3 mL) inpn 8 Units by SubCUTAneous route Before breakfast, lunch, and dinner. Fahringer, [place], [place]   ipratropium-albuteroL (COMBIVENT RESPIMAT)  mcg/actuation inhaler Take 1 Puff by inhalation four (4) times daily. 4/16/18   Fahringer, [place], PA   albuterol (PROVENTIL HFA, VENTOLIN HFA, PROAIR HFA) 90 mcg/actuation inhaler Take 2 Puffs by inhalation every four (4) hours as needed for Shortness of Breath. 7/10/19   Fahringer, [place], PA   aspirin 81 mg chewable tablet Take 1 Tab by mouth daily. 7/30/19   Verneita Meckel, NP   oxyCODONE-acetaminophen (PERCOCET) 5-325 mg per tablet Take 2 Tabs by mouth every six (6) hours as needed for Pain. 8/3/14   RY Hernandez   gabapentin (NEURONTIN) 100 mg capsule Take 100 mg by mouth three (3) times daily. Amanda Dobbs MD   FLUoxetine (PROZAC) 10 mg capsule Take 10 mg by mouth daily.     Amanda Dobbs MD     Allergies   Allergen Reactions    Iodine Unknown (comments)    Shellfish Derived Unknown (comments)        Review of Systems - History obtained from chart review and the patient  General ROS: negative  Psychological ROS: positive for - depression and suicidal ideation  Ophthalmic ROS: negative  ENT ROS: negative  Respiratory ROS: negative  Cardiovascular ROS: negative  Gastrointestinal ROS: negative  Musculoskeletal ROS: negative  Neurological ROS: negative  Dermatological ROS: negative      Objective:     Patient Vitals for the past 8 hrs:   BP Temp Pulse Resp SpO2   09/12/22 0833 109/68 97 °F (36.1 °C) 86 18 100 %       Mental Status exam: WNL except for    Sensorium  oriented to time, place and person   Orientation situation   Relations cooperative   Eye Contact appropriate   Appearance:  age appropriate   Motor Behavior:  within normal limits   Speech:  soft   Vocabulary average   Thought Process: goal directed   Thought Content free of delusions and free of hallucinations   Suicidal ideations intention   Homicidal ideations none   Mood:  stable   Affect:  stable   Memory recent  adequate   Memory remote:  adequate   Concentration:  adequate   Abstraction:  concrete   Insight:  fair   Reliability fair   Judgment:  fair         Physical Exam:   Visit Vitals  /68 (BP 1 Location: Left upper arm, BP Patient Position: Sitting)   Pulse 86   Temp 97 °F (36.1 °C)   Resp 18   Ht 5' 7\" (1.702 m)   Wt 77.1 kg (170 lb)   SpO2 100%   BMI 26.63 kg/m²     General appearance: alert, cooperative, no distress, appears stated age  Head: Normocephalic, without obvious abnormality, atraumatic  Eyes: negative  Ears: normal TM's and external ear canals AU  Nose: Nares normal. Septum midline. Mucosa normal. No drainage or sinus tenderness. Throat: Lips, mucosa, and tongue normal. gums normal, poor dentition   Neck: supple, symmetrical, trachea midline and no adenopathy  Lungs: clear to auscultation bilaterally  Heart: regular rate and rhythm, S1, S2 normal, no murmur, click, rub or gallop  Abdomen: soft, non-tender. Extremities: extremities normal, atraumatic, no cyanosis or edema  Skin: Skin color, texture, turgor normal. No rashes or lesions  Neurologic: Grossly normal        Impression:      Principal Problem:     Moderate episode of recurrent major depressive disorder ([facility] (9/9/2022)    Active Problems:    Chronic post-traumatic stress 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 (9/10/2022) Cocaine use disorder, severe, dependence ([facility] (9/10/2022)      Alcohol use disorder, severe, dependence ([facility] (9/10/2022)      Hypertension (9/10/2022)      CHF (congestive heart failure) ([facility] (9/10/2022)      COPD (chronic obstructive pulmonary disease) ([facility] (9/10/2022)      GERD (gastroesophageal reflux disease) (9/10/2022)      Glaucoma (9/10/2022)      Degenerative disc disease, lumbar (9/10/2022)      Type II diabetes mellitus ([facility] (9/10/2022)          Plan:     Recommendations for Treatment/Conditions:  Psychiatric treatment recommended while in hospital  Admit to inpatient psych for SI    Referral To:    Inpatient psychiatric care        [place], [place]   9/12/2022 12:52 PM    *H&P completed 9/11/2022, note written 9/12/2022 due to power outage on 9/11/2022

## 2022-09-12 NOTE — DIABETES MGMT
Diabetes/ Glycemic Control Plan of Care    Recommendations:  1.) decrease lantus dose from 38 units to 30 units daily at bedtime (home dose). Order obtained. Assessment: Patient with history of T2DM on Lantus (Basaglar) pen insulin 30  units daily at bedtime and a non-insulin injectable once a week (every 7 days) which he can't remember the name at this time. He presented to ED on 9/09/2022 with report of suicidal ideation. He was transferred to behavioral health unit. POC BG 9/11/2022: 85, 112, 146  POC BG 9/12/2022: 117    Patient expressed concern about the order for lantus insulin because he is only prescribed to take 30 units daily at bedtime at home. He is followed by primary care provider at the [facility] in [place]. DX:   1. Suicidal ideation        2. Crack cocaine use           Fasting/ Morning blood glucose:   Lab Results   Component Value Date/Time    Glucose 85 09/10/2022 06:56 AM    Glucose (POC) 117 (H) 09/12/2022 06:55 AM     IV Fluids containing dextrose: none    Steroids:  None. Blood glucose values: Within target range (70-180mg/dL):  YES    Current insulin orders:   Basal lantus insulin 30 units daily at bedtime  Correctional lispro insulin. Normal sensitivity dose    Total Daily Dose previous 24 hours: 38 units lantus insulin. Current A1c:   Lab Results   Component Value Date/Time    Hemoglobin A1c 6.0 (H) 09/10/2022 06:56 AM      equivalent  to ave Blood Glucose of 125 mg/dl for 2-3 months prior to admission    Adequate glycemic control PTA: NO  Nutrition/Diet:   Active Orders   Diet    ADULT DIET Regular; 4 carb choices (60 gm/meal)      Meal Intake:  No data found. Supplement Intake:  No data found.     Home diabetes medications:  Verified with patient and he reported only taking Lantus insulin 30 units daily bedtime at home and a non-insulin injectable once a week (every  7  days) but he's unable to recall the name of the medicine at this  Key Antihyperglycemic Medications               insulin glargine (LANTUS,BASAGLAR) 100 unit/mL (3 mL) inpn (Taking) 38 Units by SubCUTAneous route nightly. empagliflozin (JARDIANCE) 10 mg tablet Take 10 mg by mouth every morning. insulin aspart U-100 (NOVOLOG) 100 unit/mL (3 mL) inpn 8 Units by SubCUTAneous route Before breakfast, lunch, and dinner. Plan/Goals:   Blood glucose will be within target of 70 - 180 mg/dl within 72 hours  Reinforce dietary and medication compliance at home.           Education:  [] Refer to Diabetes Education Record                       [x] Education not indicated at this time     Minh Vasquez RN CCM

## 2022-09-13 LAB
GLUCOSE BLD STRIP.AUTO-MCNC: 110 MG/DL (ref 70–110)
GLUCOSE BLD STRIP.AUTO-MCNC: 140 MG/DL (ref 70–110)
GLUCOSE BLD STRIP.AUTO-MCNC: 191 MG/DL (ref 70–110)
GLUCOSE BLD STRIP.AUTO-MCNC: 74 MG/DL (ref 70–110)

## 2022-09-13 PROCEDURE — 74011000250 HC RX REV CODE- 250: Performed by: PSYCHIATRY & NEUROLOGY

## 2022-09-13 PROCEDURE — 74011250637 HC RX REV CODE- 250/637: Performed by: PSYCHIATRY & NEUROLOGY

## 2022-09-13 PROCEDURE — 74011636637 HC RX REV CODE- 636/637: Performed by: PSYCHIATRY & NEUROLOGY

## 2022-09-13 PROCEDURE — 82962 GLUCOSE BLOOD TEST: CPT

## 2022-09-13 PROCEDURE — 65220000003 HC RM SEMIPRIVATE PSYCH

## 2022-09-13 PROCEDURE — 99231 SBSQ HOSP IP/OBS SF/LOW 25: CPT | Performed by: PSYCHIATRY & NEUROLOGY

## 2022-09-13 RX ORDER — LIDOCAINE 4 G/100G
1 PATCH TOPICAL EVERY 24 HOURS
Status: DISCONTINUED | OUTPATIENT
Start: 2022-09-13 | End: 2022-09-15 | Stop reason: HOSPADM

## 2022-09-13 RX ADMIN — PREGABALIN 150 MG: 50 CAPSULE ORAL at 20:57

## 2022-09-13 RX ADMIN — INSULIN GLARGINE 30 UNITS: 100 INJECTION, SOLUTION SUBCUTANEOUS at 22:18

## 2022-09-13 RX ADMIN — CHOLECALCIFEROL TAB 25 MCG (1000 UNIT) 2000 UNITS: 25 TAB at 08:11

## 2022-09-13 RX ADMIN — CARVEDILOL 3.12 MG: 3.12 TABLET, FILM COATED ORAL at 20:57

## 2022-09-13 RX ADMIN — CLOPIDOGREL BISULFATE 75 MG: 75 TABLET ORAL at 08:10

## 2022-09-13 RX ADMIN — FLUOXETINE 20 MG: 20 CAPSULE ORAL at 08:11

## 2022-09-13 RX ADMIN — ALBUTEROL SULFATE 1 PUFF: 108 AEROSOL, METERED RESPIRATORY (INHALATION) at 11:46

## 2022-09-13 RX ADMIN — ISOSORBIDE MONONITRATE 60 MG: 60 TABLET, EXTENDED RELEASE ORAL at 08:11

## 2022-09-13 RX ADMIN — FAMOTIDINE 20 MG: 20 TABLET ORAL at 17:37

## 2022-09-13 RX ADMIN — ACETAMINOPHEN 650 MG: 325 TABLET, FILM COATED ORAL at 19:47

## 2022-09-13 RX ADMIN — LATANOPROST 1 DROP: 50 SOLUTION OPHTHALMIC at 18:05

## 2022-09-13 RX ADMIN — ACETAMINOPHEN 650 MG: 325 TABLET, FILM COATED ORAL at 08:11

## 2022-09-13 RX ADMIN — CARVEDILOL 3.12 MG: 3.12 TABLET, FILM COATED ORAL at 08:10

## 2022-09-13 RX ADMIN — ALBUTEROL SULFATE 1 PUFF: 108 AEROSOL, METERED RESPIRATORY (INHALATION) at 08:30

## 2022-09-13 RX ADMIN — LACTULOSE 15 ML: 20 SOLUTION ORAL at 13:15

## 2022-09-13 RX ADMIN — POTASSIUM CHLORIDE 10 MEQ: 750 TABLET, EXTENDED RELEASE ORAL at 08:32

## 2022-09-13 RX ADMIN — PREGABALIN 150 MG: 50 CAPSULE ORAL at 08:10

## 2022-09-13 RX ADMIN — FUROSEMIDE 20 MG: 20 TABLET ORAL at 08:10

## 2022-09-13 RX ADMIN — MAGNESIUM HYDROXIDE 30 ML: 400 SUSPENSION ORAL at 08:32

## 2022-09-13 RX ADMIN — ATORVASTATIN CALCIUM 80 MG: 20 TABLET, FILM COATED ORAL at 20:56

## 2022-09-13 RX ADMIN — FAMOTIDINE 20 MG: 20 TABLET ORAL at 08:11

## 2022-09-13 NOTE — BSMART NOTE
ART THERAPY GROUP PROGRESS NOTE    PATIENT SCHEDULED FOR GROUP AT: 10:15    ATTENDANCE: Full    PARTICIPATION LEVEL: Participates fully in the art process    ATTENTION LEVEL : Able to focus on task    FOCUS: Mindfulness    SYMBOLIC & THEMATIC CONTENT AS NOTED IN IMAGERY: He was cheerful and sarcastic. He joked with peers and lacked investment in the task at hand. He displayed poor frustration tolerance and did not complete the task at hand.

## 2022-09-13 NOTE — BSMART NOTE
SW Contact:      Pt is a 79-year-old AA male. \"I been feeling suicidal since I returned from war\" with a plan to overdose on drugs. He  also experiencing [facility] telling him to kill himself. Patient reports having flashbacks of \"fire fights\" form the war. Recently on x4day Crack Binge. Hx Tx with V A but none last couple years. ASSESSMENT:  AXIS I:  Major depression moderate, recurrent. Post-traumatic stress disorder, delayed type, related to [employer]. Cocaine use disorder, severe. Alcohol use disorder, severe. AXIS II:  None. AXIS III:  Coronary artery disease. Status post infarct. Hypertension. Congestive heart failure history. Chronic obstructive pulmonary disease. Hyperlipidemia. Diabetes mellitus type 2. Gastroesophageal reflux disease. Glaucoma. Pt contact: Continue to process with pt the value of compliance with tx team goals, groups & activities to help give him more direction, support & relief. Pt agreed to comply. Dr & tx team updated.

## 2022-09-13 NOTE — PROGRESS NOTES
[address] [facility] [address]  Inpatient Progress Note     Date of Service: 09/13/22  Hospital Day: 4     Subjective/Interval History   09/13/22    Treatment Team Notes:  Notes reviewed and discussed and report that Lawrence Brewer is a 65-year-old male with history of major depressive disorder and PTSD who was admitted for suicidal ideation. No acute events overnight. Patient complains of back pain. Patient interview: Lawrence Brewer was interviewed by this writer today. The patient reports some improvement in mood today. He denies suicidal ideation. He says he still does not want to be here and wants to be transferred to a [facility].  The  has started the process. The patient is complaining of poor sleep but denies nightmares. He says he has severe back pain and neck pain which he rates as a 10. He is not willing to take any pill for this. He is willing to try the lidocaine patch. Patient reports improvement in appetite but has been complaining of constipation. Objective     Visit Vitals  /69   Pulse 94   Temp 97.4 °F (36.3 °C)   Resp 20   Ht 5' 7\" (1.702 m)   Wt 77.1 kg (170 lb)   SpO2 97%   BMI 26.63 kg/m²       Recent Results (from the past 24 hour(s))   GLUCOSE, POC    Collection Time: 09/12/22  8:34 PM   Result Value Ref Range    Glucose (POC) 170 (H) 70 - 110 mg/dL   GLUCOSE, POC    Collection Time: 09/13/22  7:08 AM   Result Value Ref Range    Glucose (POC) 74 70 - 110 mg/dL   GLUCOSE, POC    Collection Time: 09/13/22 11:28 AM   Result Value Ref Range    Glucose (POC) 140 (H) 70 - 110 mg/dL       Mental Status Examination     Appearance/Hygiene 65 y.o.  BLACK/ male  Hygiene: Fair   Behavior/Social Relatedness Appropriate   Musculoskeletal Gait/Station: appropriate  Tone (flaccid, cogwheeling, spastic): not assessed  Psychomotor (hyperkinetic, hypokinetic): calm   Involuntary movements (tics, dyskinesias, akathisa, stereotypies): none   Speech   Rate, rhythm, volume, fluency and articulation are appropriate   Mood   depressed but better   Affect    Congruent   Thought Process Linear and goal directed   Thought Content and Perceptual Disturbances Denies self-injurious behavior (SIB), suicidal ideation (SI), aggressive behavior or homicidal ideation (HI)    Denies auditory and visual hallucinations   Sensorium and Cognition  Grossly intact   Insight  fair   Judgment fair        Assessment/Plan      Psychiatric Diagnoses:   Patient Active Problem List   Diagnosis Code    CAP (community acquired pneumonia) J18.9    Moderate episode of recurrent major depressive disorder ([facility]) F33.1    Chronic post-traumatic stress 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 F43.12    Cocaine use disorder, severe, dependence ([facility] F14.20    Alcohol use disorder, severe, dependence ([facility] F10.20    Hypertension I10    CHF (congestive heart failure) ([facility]) I50.9    COPD (chronic obstructive pulmonary disease) ([facility]) J44.9    GERD (gastroesophageal reflux disease) K21.9    Glaucoma H40.9    Degenerative disc disease, lumbar M51.36    Type II diabetes mellitus ([facility] E11.9       Level of impairment/disability: Severe Christean Bourbon is a 65 y.o. who is currently admitted for suicidal ideation, depression and PTSD. The patient is still very depressed. 1.  Discontinue amitriptyline due to anticholinergic side effects. Trial of lidocaine patch once a day for pain. Continue Prozac 20 mg daily for depression and PTSD. 2.   has started referral process for the [employer] in [facility]. 3.  Disposition/Discharge Date: To be determined.     Zoila Del Valle MD DR. [facility]  Psychiatry

## 2022-09-13 NOTE — BH NOTES
Patient was in the Day Area , watching football with his peers, when this RN assumed the responsibility of his care by 1900. Patient disposition was pleasant and continues to be treatment focused. Patient's hours of sleep blood glucose level , was 170, requiring 2 units of humalog. Denies suicidal and homicidal ideations, passive death wish , auditory and visual hallucinations. Patient requested and received PRN tylenol for generalized pain. Rated the severity of his physical pain as 7 out of 10. Nursing will continue to monitor patient for health and safety and provide support.

## 2022-09-13 NOTE — PROGRESS NOTES
Pt calm and cooperative this AM.  Med and diet compliant. Currently engaged in art therapy. Problem: Suicide  Goal: *STG: Remains safe in hospital  Description: AEB pt not harming self or others, every shift  Outcome: Progressing Towards Goal  Goal: *STG: Seeks staff when feelings of self harm or harm towards others arise  Description: AEB pt seeking staff when feelings of self harm or harm towards others arise, as needed  Outcome: Progressing Towards Goal  Goal: *STG: Attends activities and groups  Description: AEB pt attending 3 groups, daily  Outcome: Progressing Towards Goal  Goal: *STG/LTG: Complies with medication therapy  Description: AEB pt taking all scheduled medications, every shift  Outcome: Progressing Towards Goal  Goal: *STG/LTG: No longer expresses self destructive or suicidal thoughts  Description: AEB pt no longer expressing self destructive or suicidal thoughts, every shfit  Outcome: Progressing Towards Goal  Goal: Interventions  Outcome: Progressing Towards Goal     Problem: Crack/Cocaine Withdrawal  Goal: *STG: Will identify negative impact of chemical dependency including the use of tobacco, alcohol, and other substances  Description: AEB pt identifying 3 negative impacts of chemical dependency, prior to discharge  Outcome: Progressing Towards Goal  Goal: *STG: Verbalizes abstinence as an achievable goal  Description: AEB pt verbalizing abstinence as an achievable goal, prior to discharge  Outcome: Progressing Towards Goal  Goal: Interventions  Outcome: Progressing Towards Goal     Problem: Hypertension  Goal: *Blood pressure within specified parameters  Description: AEB pt bp being within specified parameters, daily  Outcome: Progressing Towards Goal     Problem: Diabetes Self-Management  Goal: *Disease process and treatment process  Description: Define diabetes and identify own type of diabetes; list 3 options for treating diabetes.   Outcome: Progressing Towards Goal  Goal: *Using medications safely  Description: State effect of diabetes medications on diabetes; name diabetes medication taking, action and side effects. Outcome: Progressing Towards Goal     Problem: Falls - Risk of  Goal: *Absence of Falls  Description: Document Issac Fall Risk and appropriate interventions in the flowsheet, every shift.   Outcome: Progressing Towards Goal  Note: Fall Risk Interventions:  Mobility Interventions: Utilize walker, cane, or other assistive device         Medication Interventions: Teach patient to arise slowly                   Problem: Deep Venous Thrombosis - Risk of  Goal: *Absence of impaired coagulation signs and symptoms  Description: AEB pt being absent of impaired coagulation s/s, every shift  Outcome: Progressing Towards Goal  Goal: *Knowledge of prescribed medications  Description: AEB pt verbalizing knowledge of prescribe anti-coagulation medications, prior to discharge  Outcome: Progressing Towards Goal

## 2022-09-13 NOTE — DIABETES MGMT
Diabetes/ Glycemic Control Plan of Care    Patient with history of T2DM on Lantus (Basaglar) pen insulin 30  units daily at bedtime and a non-insulin injectable once a week (every 7 days) which he can't remember the name at this time. He presented to ED on 9/09/2022 with report of suicidal ideation. He was transferred to behavioral health unit. Recommendations:  1.) cont on current insulin orders. Assessment:  BG values within target. POC BG 9/12/2022: 117, 170  POC BG 9/13/2022: 74, 40      DX:   1. Suicidal ideation        2. Crack cocaine use           Fasting/ Morning blood glucose:   Lab Results   Component Value Date/Time    Glucose 85 09/10/2022 06:56 AM    Glucose (POC) 140 (H) 09/13/2022 11:28 AM     IV Fluids containing dextrose: none    Steroids:  None. Blood glucose values: Within target range (70-180mg/dL):  YES    Current insulin orders:   Basal lantus insulin 30 units daily at bedtime  Correctional lispro insulin. Normal sensitivity dose    Total Daily Dose previous 24 hours: 38 units lantus insulin. Current A1c:   Lab Results   Component Value Date/Time    Hemoglobin A1c 6.0 (H) 09/10/2022 06:56 AM      equivalent  to ave Blood Glucose of 125 mg/dl for 2-3 months prior to admission    Adequate glycemic control PTA: NO  Nutrition/Diet:   Active Orders   Diet    ADULT DIET Regular; 4 carb choices (60 gm/meal)      Meal Intake:  No data found. Supplement Intake:  No data found. Home diabetes medications:  Verified with patient and he reported only taking Lantus insulin 30 units daily bedtime at home and a non-insulin injectable once a week (every  7  days) but he's unable to recall the name of the medicine at this  Key Antihyperglycemic Medications               insulin glargine (LANTUS,BASAGLAR) 100 unit/mL (3 mL) inpn (Taking) 38 Units by SubCUTAneous route nightly. empagliflozin (JARDIANCE) 10 mg tablet Take 10 mg by mouth every morning.     insulin aspart U-100 (NOVOLOG) 100 unit/mL (3 mL) inpn 8 Units by SubCUTAneous route Before breakfast, lunch, and dinner. Plan/Goals:   Blood glucose will be within target of 70 - 180 mg/dl within 72 hours  Reinforce dietary and medication compliance at home.           Education:  [] Refer to Diabetes Education Record                       [x] Education not indicated at this time     Zach Hurt RN CCM

## 2022-09-13 NOTE — PROGRESS NOTES
Pt came up to nurse's station while nurse was on phone demanding nurse to talk to him; writer requested patient to hold on one second while nurse was on phone with another patient's family member. Pt began yelling and demanding \"No! Look at my eyes! \" And pulling on phone cord from across nurse's station desk. Nurse urged pt to let go and sit down until nurse was done with call, but patient became irate stating he was going to 'fall out' MHT called over to assist patient while nurse was occupied. Pt later came up to nurses station stating \"You ready to give me my meds yet?!\" When asked patient what medication he was talking about pt again became irate and very argumentative with nurse. Pt was told he was not going to yell and demand things from staff and could get his medication when he was able to calm himself and ask for things with respect. Pt finally calmed and received medication for heartburn.

## 2022-09-14 LAB
GLUCOSE BLD STRIP.AUTO-MCNC: 109 MG/DL (ref 70–110)
GLUCOSE BLD STRIP.AUTO-MCNC: 117 MG/DL (ref 70–110)
GLUCOSE BLD STRIP.AUTO-MCNC: 124 MG/DL (ref 70–110)
GLUCOSE BLD STRIP.AUTO-MCNC: 132 MG/DL (ref 70–110)

## 2022-09-14 PROCEDURE — 74011250637 HC RX REV CODE- 250/637: Performed by: PSYCHIATRY & NEUROLOGY

## 2022-09-14 PROCEDURE — 74011000250 HC RX REV CODE- 250: Performed by: PSYCHIATRY & NEUROLOGY

## 2022-09-14 PROCEDURE — 99231 SBSQ HOSP IP/OBS SF/LOW 25: CPT | Performed by: PSYCHIATRY & NEUROLOGY

## 2022-09-14 PROCEDURE — 82962 GLUCOSE BLOOD TEST: CPT

## 2022-09-14 PROCEDURE — 74011636637 HC RX REV CODE- 636/637: Performed by: PSYCHIATRY & NEUROLOGY

## 2022-09-14 PROCEDURE — 65220000003 HC RM SEMIPRIVATE PSYCH

## 2022-09-14 RX ORDER — CYCLOBENZAPRINE HCL 5 MG
5 TABLET ORAL
Status: DISCONTINUED | OUTPATIENT
Start: 2022-09-14 | End: 2022-09-15 | Stop reason: HOSPADM

## 2022-09-14 RX ORDER — TRAZODONE HYDROCHLORIDE 50 MG/1
25 TABLET ORAL
Status: DISCONTINUED | OUTPATIENT
Start: 2022-09-14 | End: 2022-09-14

## 2022-09-14 RX ADMIN — CLOPIDOGREL BISULFATE 75 MG: 75 TABLET ORAL at 08:01

## 2022-09-14 RX ADMIN — CARVEDILOL 3.12 MG: 3.12 TABLET, FILM COATED ORAL at 21:04

## 2022-09-14 RX ADMIN — ALBUTEROL SULFATE 1 PUFF: 108 AEROSOL, METERED RESPIRATORY (INHALATION) at 19:48

## 2022-09-14 RX ADMIN — POTASSIUM CHLORIDE 10 MEQ: 750 TABLET, EXTENDED RELEASE ORAL at 08:01

## 2022-09-14 RX ADMIN — FLUOXETINE 20 MG: 20 CAPSULE ORAL at 08:01

## 2022-09-14 RX ADMIN — ISOSORBIDE MONONITRATE 60 MG: 60 TABLET, EXTENDED RELEASE ORAL at 08:01

## 2022-09-14 RX ADMIN — ACETAMINOPHEN 650 MG: 325 TABLET, FILM COATED ORAL at 08:01

## 2022-09-14 RX ADMIN — ACETAMINOPHEN 650 MG: 325 TABLET, FILM COATED ORAL at 12:50

## 2022-09-14 RX ADMIN — CHOLECALCIFEROL TAB 25 MCG (1000 UNIT) 2000 UNITS: 25 TAB at 08:01

## 2022-09-14 RX ADMIN — FUROSEMIDE 20 MG: 20 TABLET ORAL at 08:01

## 2022-09-14 RX ADMIN — INSULIN GLARGINE 30 UNITS: 100 INJECTION, SOLUTION SUBCUTANEOUS at 21:07

## 2022-09-14 RX ADMIN — ALBUTEROL SULFATE 1 PUFF: 108 AEROSOL, METERED RESPIRATORY (INHALATION) at 08:01

## 2022-09-14 RX ADMIN — ACETAMINOPHEN 650 MG: 325 TABLET, FILM COATED ORAL at 17:52

## 2022-09-14 RX ADMIN — CARVEDILOL 3.12 MG: 3.12 TABLET, FILM COATED ORAL at 08:01

## 2022-09-14 RX ADMIN — PREGABALIN 150 MG: 50 CAPSULE ORAL at 21:04

## 2022-09-14 RX ADMIN — ATORVASTATIN CALCIUM 80 MG: 20 TABLET, FILM COATED ORAL at 21:04

## 2022-09-14 RX ADMIN — PREGABALIN 150 MG: 50 CAPSULE ORAL at 08:01

## 2022-09-14 RX ADMIN — FAMOTIDINE 20 MG: 20 TABLET ORAL at 08:01

## 2022-09-14 RX ADMIN — LATANOPROST 1 DROP: 50 SOLUTION OPHTHALMIC at 17:52

## 2022-09-14 RX ADMIN — FAMOTIDINE 20 MG: 20 TABLET ORAL at 17:52

## 2022-09-14 NOTE — BH NOTES
Patient has been visible in the milieu and would make his needs  known to staff . No incidence of poor frustration tolerance noted during this shift. Patient requested and received PRN tylenol for chronic back pain. Rated severity of pain as a 7 out of 10. Patient received 30 units of lantus. Refused 2 unit homalog coverage. Nursing will continue to monitor patient for health and safety and provide support.

## 2022-09-14 NOTE — PROGRESS NOTES
Problem: Suicide  Goal: *STG: Remains safe in hospital  Description: AEB pt not harming self or others, every shift  Outcome: Progressing Towards Goal  Goal: *STG/LTG: Complies with medication therapy  Description: AEB pt taking all scheduled medications, every shift  Outcome: Progressing Towards Goal     Problem: Diabetes Self-Management  Goal: *Using medications safely  Description: State effect of diabetes medications on diabetes; name diabetes medication taking, action and side effects. Outcome: Progressing Towards Goal       Pt is calm and cooperative. Pt is smiling and joking with staff. Pt is med and meal compliant. Pt reports pain 10/10 in his back.

## 2022-09-14 NOTE — PROGRESS NOTES
[address] [facility] [address] [facility] [address]  Inpatient Progress Note     Date of Service: 09/14/22  Hospital Day: 5     Subjective/Interval History   09/14/22    Treatment Team Notes: No acute events overnight per nursing report. Patient has been interacting appropriately on the milieu. He received Tylenol as needed for back pain. Patient interview: Peyton Feldman was interviewed by this writer today. The patient says he is doing better than yesterday. His mood is stable and euthymic and he denies suicidal ideation or psychotic symptoms. He denies nightmares. He would like to be discharged and feels that he would do better in a [place] hospital.  He is still complaining of severe pain in his neck. The lidocaine patch help with the back pain but he says his neck pain is stiff and painful with movement. He is also still complaining of constipation. Collateral information was obtained from his wife with whom he has had contact during this hospitalization. His wife says he seems to be better but she is worried that when he returns home he will engage in the same behavior. She says he has poor impulse control. He has not been following up with outpatient mental health treatment at the [place]. She says the [place] had provided some equipment for them to do virtual group therapy and psychiatric follow-up but they were not able to use the equipment properly so it was not working for them, so they returned it to the [place] and have not had any psychiatric follow-up since the pandemic began. She was advised to call the [place] this afternoon to see if she could get an appointment for psychotherapy and medication management.   Objective     Visit Vitals  /65 (BP 1 Location: Left upper arm, BP Patient Position: Sitting)   Pulse 96   Temp 98 °F (36.7 °C)   Resp 16   Ht 5' 7\" (1.702 m)   Wt 77.1 kg (170 lb)   SpO2 97%   BMI 26.63 kg/m²       Recent Results (from the past 24 hour(s))   GLUCOSE, POC    Collection Time: 09/13/22  4:09 PM Result Value Ref Range    Glucose (POC) 110 70 - 110 mg/dL   GLUCOSE, POC    Collection Time: 09/13/22  8:07 PM   Result Value Ref Range    Glucose (POC) 191 (H) 70 - 110 mg/dL   GLUCOSE, POC    Collection Time: 09/14/22  6:59 AM   Result Value Ref Range    Glucose (POC) 132 (H) 70 - 110 mg/dL       Mental Status Examination     Appearance/Hygiene 65 y.o. BLACK/ male  Hygiene: Fair   Behavior/Social Relatedness Appropriate   Musculoskeletal Gait/Station: appropriate  Tone (flaccid, cogwheeling, spastic): not assessed  Psychomotor (hyperkinetic, hypokinetic): calm   Involuntary movements (tics, dyskinesias, akathisa, stereotypies): none   Speech   Rate, rhythm, volume, fluency and articulation are appropriate   Mood   Euthymic   Affect    Congruent   Thought Process Linear and goal directed   Thought Content and Perceptual Disturbances Denies self-injurious behavior (SIB), suicidal ideation (SI), aggressive behavior or homicidal ideation (HI)    Denies auditory and visual hallucinations   Sensorium and Cognition  Grossly intact   Insight  fair   Judgment fair        Assessment/Plan      Psychiatric Diagnoses:   Patient Active Problem List   Diagnosis Code    CAP (community acquired pneumonia) J18.9    Moderate episode of recurrent major depressive disorder ([facility]) F33.1    Chronic post-traumatic stress 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 F43.12    Cocaine use disorder, severe, dependence ([address] F14.20    Alcohol use disorder, severe, dependence ([address] F10.20    Hypertension I10    CHF (congestive heart failure) ([facility]) I50.9    COPD (chronic obstructive pulmonary disease) ([facility]) J44.9    GERD (gastroesophageal reflux disease) K21.9    Glaucoma H40.9    Degenerative disc disease, lumbar M51.36    Type II diabetes mellitus ([address] E11.9       Level of impairment/disability: Severe Dee Eve is a 65 y.o. who is currently admitted for suicidal ideation, depression and PTSD.   The patient's mood is euthymic and he has been denying suicidal ideation for the past 2 days. 1.  Continue current medication regimen without changes. Lactulose has been added for constipation. Flexeril 5 mg twice daily will be added for muscle stiffness. 2.  Discharge planning for tomorrow. Patient believes that he will do better in the [place] setting with other veterans. He currently is not meeting acute inpatient psychiatric hospitalization criteria. Will see if he can follow up as an outpatient with the [place] clinics.     Ivanna Erwin MD [facility]  Psychiatry

## 2022-09-14 NOTE — PROGRESS NOTES
Pt has been social and cheerful this afternoon. Pt has played cards with peers. Pt is med and meal compliant.   Pt is still struggling with pain 10/10, Tylenol given on schedule

## 2022-09-14 NOTE — BSMART NOTE
ART THERAPY GROUP PROGRESS NOTE    PATIENT SCHEDULED FOR GROUP AT: [id number]    ATTENDANCE: Full    PARTICIPATION LEVEL: Participates fully in the art process    ATTENTION LEVEL : Able to focus on task    FOCUS: Mindfulness    SYMBOLIC & THEMATIC CONTENT AS NOTED IN IMAGERY: He was calm, compliant, and invested in the task at hand. He was able to effectively problem-solve on the task and appeared more invested than noted in yesterday's group.

## 2022-09-14 NOTE — BH NOTES
Pt appeared to have slept for 5.5 hours thus far during the night shift. Staff will continue to monitor Pt for safety and behavior.

## 2022-09-14 NOTE — MED STUDENT NOTES
*ATTENTION:  This note has been created by a medical student for educational purposes only. Please do not refer to the content of this note for clinical decision-making, billing, or other purposes. Please see attending physicians note to obtain clinical information on this patient. *     Date of Service: 09/14/22  Hospital Day: 5      Subjective/Interval History   09/14/22     Treatment Team Notes:  Notes were reviewed and demonstrate that Andrea Wynne is a 57-year-old male with history of major depressive disorder and PTSD who was admitted for suicidal ideation. During the day yesterday, the patient interacted with other patients, watched TV and played cards. Patient was aggressive towards nursing staff last night. Patient continues to complain of back and neck pain. Patient interview: Andrea Wynne was interviewed by this student doctor today. The patient reports that his mood would be really good if his neck and back pain would subside. He denies suicidal ideation. He says he still does not want to be here and wants to be transferred to a [facility].   The patient stated that he slept okay last night and that he dreamt a little, but denies nightmares. He says he has continual back and right sided neck pain. He tried the lidocaine patch on his back which helped significantly and he is interested in something similar for his neck. Patient reports that his appetite is okay but has been persistent constipation, despite stopping amitriptyline. He is interested in a laxative. Objective      Visit Vitals  /65 (BP 1 Location: Left upper arm, BP Patient Position: Sitting)   Pulse 96   Temp 98 °F (36.7 °C)   Resp 16   Ht 5' 7\" (1.702 m)   Wt 77.1 kg (170 lb)   SpO2 97%   BMI 26.63 kg/m²     Mental Status Examination      Appearance/Hygiene 65 y.o.  BLACK/ male  Hygiene: Fair   Behavior/Social Relatedness Appropriate   Musculoskeletal Gait/Station: appropriate  Tone (flaccid, cogwheeling, spastic): not assessed  Psychomotor (hyperkinetic, hypokinetic): calm   Involuntary movements (tics, dyskinesias, akathisa, stereotypies): none   Speech                Rate, rhythm, volume, fluency and articulation are appropriate   Mood                \"Good\"   Affect                               Congruent   Thought Process Linear and goal directed   Thought Content and Perceptual Disturbances Denies self-injurious behavior (SIB), suicidal ideation (SI), aggressive behavior or homicidal ideation (HI)     Denies auditory and visual hallucinations   Sensorium and Cognition    Grossly intact   Insight                fair   Judgment fair         Assessment/Plan     Patient Active Problem List   Diagnosis Code    CAP (community acquired pneumonia) J18.9    Moderate episode of recurrent major depressive disorder ([facility]) F33.1    Chronic post-traumatic stress 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 F43.12    Cocaine use disorder, severe, dependence ([facility] F14.20    Alcohol use disorder, severe, dependence ([address] F10.20    Hypertension I10    CHF (congestive heart failure) ([facility]) I50.9    COPD (chronic obstructive pulmonary disease) ([facility]) J44.9    GERD (gastroesophageal reflux disease) K21.9    Glaucoma H40.9    Degenerative disc disease, lumbar M51.36    Type II diabetes mellitus ([address] E11.9     Level of Impairment/Disability: Moderate    Rick Mathews is a 76-year-old male who is currently admitted for suicidal ideation, major depression and PTSD. Continue lidocaine patch once a day for back pain. Trial lidocaine patch once a day for neck pain.    Continue fluoxetine 20mg every day for PTSD and depression  Continue lactulose and milk of magnesia every day for constipation    Kerline Johnson  9/14/2022  8:36 AM

## 2022-09-15 VITALS
BODY MASS INDEX: 26.68 KG/M2 | HEIGHT: 67 IN | HEART RATE: 82 BPM | TEMPERATURE: 97.5 F | DIASTOLIC BLOOD PRESSURE: 67 MMHG | WEIGHT: 170 LBS | OXYGEN SATURATION: 97 % | RESPIRATION RATE: 18 BRPM | SYSTOLIC BLOOD PRESSURE: 121 MMHG

## 2022-09-15 LAB
GLUCOSE BLD STRIP.AUTO-MCNC: 111 MG/DL (ref 70–110)
GLUCOSE BLD STRIP.AUTO-MCNC: 133 MG/DL (ref 70–110)

## 2022-09-15 PROCEDURE — 74011250637 HC RX REV CODE- 250/637: Performed by: PSYCHIATRY & NEUROLOGY

## 2022-09-15 PROCEDURE — 82962 GLUCOSE BLOOD TEST: CPT

## 2022-09-15 PROCEDURE — 99239 HOSP IP/OBS DSCHRG MGMT >30: CPT | Performed by: PSYCHIATRY & NEUROLOGY

## 2022-09-15 PROCEDURE — 74011000250 HC RX REV CODE- 250: Performed by: PSYCHIATRY & NEUROLOGY

## 2022-09-15 RX ORDER — FLUOXETINE HYDROCHLORIDE 20 MG/1
20 CAPSULE ORAL DAILY
Qty: 30 CAPSULE | Refills: 0 | Status: SHIPPED | OUTPATIENT
Start: 2022-09-16

## 2022-09-15 RX ADMIN — POTASSIUM CHLORIDE 10 MEQ: 750 TABLET, EXTENDED RELEASE ORAL at 08:13

## 2022-09-15 RX ADMIN — ACETAMINOPHEN 650 MG: 325 TABLET, FILM COATED ORAL at 10:14

## 2022-09-15 RX ADMIN — PREGABALIN 150 MG: 50 CAPSULE ORAL at 08:12

## 2022-09-15 RX ADMIN — CHOLECALCIFEROL TAB 25 MCG (1000 UNIT) 2000 UNITS: 25 TAB at 08:13

## 2022-09-15 RX ADMIN — ALBUTEROL SULFATE 1 PUFF: 108 AEROSOL, METERED RESPIRATORY (INHALATION) at 10:39

## 2022-09-15 RX ADMIN — FLUOXETINE 20 MG: 20 CAPSULE ORAL at 08:13

## 2022-09-15 RX ADMIN — CARVEDILOL 3.12 MG: 3.12 TABLET, FILM COATED ORAL at 08:13

## 2022-09-15 RX ADMIN — ALBUTEROL SULFATE 1 PUFF: 108 AEROSOL, METERED RESPIRATORY (INHALATION) at 04:29

## 2022-09-15 RX ADMIN — FUROSEMIDE 20 MG: 20 TABLET ORAL at 08:13

## 2022-09-15 RX ADMIN — CLOPIDOGREL BISULFATE 75 MG: 75 TABLET ORAL at 08:13

## 2022-09-15 RX ADMIN — ISOSORBIDE MONONITRATE 60 MG: 60 TABLET, EXTENDED RELEASE ORAL at 08:13

## 2022-09-15 RX ADMIN — FAMOTIDINE 20 MG: 20 TABLET ORAL at 08:13

## 2022-09-15 NOTE — PROGRESS NOTES
Medicated with Tylenol at 1012 due to chronic h/o back pain. Pain level  6/10--Now -improved 4/10. Per patient he will follow-up with the [place] after discharge.

## 2022-09-15 NOTE — BSMART NOTE
SW Contact:      Pt is a 54-year-old AA male. \"I been feeling suicidal since I returned from war\" with a plan to overdose on drugs. He  also experiencing  [facility] telling him to kill himself. Patient reports having flashbacks of \"fire fights\" form the war. Recently on x4day Crack Binge. Hx Tx with [facility] but none last couple years. ASSESSMENT:  AXIS I:  Major depression moderate, recurrent. Post-traumatic stress disorder, delayed type, related to [employer]. Cocaine use disorder, severe. Alcohol use disorder, severe. AXIS II:  None. AXIS III:  Coronary artery disease. Status post infarct. Hypertension. Congestive heart failure history. Chronic obstructive pulmonary disease. Hyperlipidemia. Diabetes mellitus type 2. Gastroesophageal reflux disease. Glaucoma. Pt contact: He attended staffing with tx team & felt positive about feeling less dysphoric with less distraction from internal stimuli. Told his clinical; info will be forwarded to JOSE Cook who can assist with x30 day tx programs. All reviewed his d/c & safety plan. Pt will follow up with [facility]  His Clinical Packet has been Faxed & he'll maintain contact for x30day Tx with:    Toby Oppenheim, [address] / [facility]  # [phone]  Back up: return to [address] [place] [address] #[phone]. Dr & tx team updated.      ADDENDUM 9/16/22 Writer returned call to Toby Oppenheim at 3:30pm
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
## 2022-09-15 NOTE — DISCHARGE INSTRUCTIONS
BEHAVIORAL HEALTH NURSING DISCHARGE NOTE      The following personal items collected during your admission are returned to you:   Dental Appliance: Dental Appliances: None  Vision:    Hearing Aid:    Jewelry: Jewelry: None  Clothing: Clothing: Undergarments, Shirt, Hat, Footwear, Pants  Other Valuables: Other Valuables: Wallet (w/ ID's)  Valuables sent to safe:        PATIENT INSTRUCTIONS:             The discharge information has been reviewed with the patient. The patient verbalized understanding.         Patient armband removed and shredded

## 2022-09-15 NOTE — MED STUDENT NOTES
*ATTENTION:  This note has been created by a medical student for educational purposes only. Please do not refer to the content of this note for clinical decision-making, billing, or other purposes. Please see attending physicians note to obtain clinical information on this patient. *     Date of Service: 09/15/22  Hospital Day: 6      Subjective/Interval History   09/15/22     Treatment Team Notes:  Notes were reviewed and demonstrate that Rehana Bills is a 14-year-old male with history of major depressive disorder and PTSD who was admitted for suicidal ideation. During the day yesterday, the patient interacted with other patients, watched TV and played cards. He participated in group yesterday. Patient continues to complain of back and neck pain. Patient interview: Rehana Bills was interviewed by this student doctor today. The patient reports that he is feeling okay today. He denies suicidal ideation. He states that he wants to leave today and he will make his way over to the [facility] in [place] tomorrow morning. He plans on doing a 28 day program there. The patient stated that he slept 6 hours last night and denies nightmares. He says he has continual back and right sided neck pain. He tried the lidocaine patch on his back which helped significantly and he is interested in a cream for his neck. Patient reports that his appetite is good. His constipation has resolved. Objective      Visit Vitals  /67   Pulse 82   Temp 97.5 °F (36.4 °C)   Resp 18   Ht 5' 7\" (1.702 m)   Wt 77.1 kg (170 lb)   SpO2 97%   BMI 26.63 kg/m²     Mental Status Examination      Appearance/Hygiene 65 y.o.  BLACK/ male  Hygiene: Fair   Behavior/Social Relatedness Appropriate   Musculoskeletal Gait/Station: appropriate  Tone (flaccid, cogwheeling, spastic): not assessed  Psychomotor (hyperkinetic, hypokinetic): calm   Involuntary movements (tics, dyskinesias, akathisa, stereotypies): none   Speech Rate, rhythm, volume, fluency and articulation are appropriate   Mood                \"Good\"   Affect                               Congruent   Thought Process Linear and goal directed   Thought Content and Perceptual Disturbances Denies self-injurious behavior (SIB), suicidal ideation (SI), aggressive behavior or homicidal ideation (HI)     Denies auditory and visual hallucinations   Sensorium and Cognition    Grossly intact   Insight                fair   Judgment fair         Assessment/Plan     Patient Active Problem List   Diagnosis Code    CAP (community acquired pneumonia) J18.9    Moderate episode of recurrent major depressive disorder ([facility]) F33.1    Chronic post-traumatic stress 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 F43.12    Cocaine use disorder, severe, dependence ([facility] F14.20    Alcohol use disorder, severe, dependence ([facility] F10.20    Hypertension I10    CHF (congestive heart failure) ([facility]) I50.9    COPD (chronic obstructive pulmonary disease) ([facility]) J44.9    GERD (gastroesophageal reflux disease) K21.9    Glaucoma H40.9    Degenerative disc disease, lumbar M51.36    Type II diabetes mellitus ([facility] E11.9     Level of Impairment/Disability: Moderate    Anibal Kapoor is a 67-year-old male who is currently admitted for suicidal ideation, major depression and PTSD. Continue lidocaine patch once a day for back pain.    Continue fluoxetine 20mg every day for PTSD and depression  Discharge today -- patient will go home and then to [address]  9/15/2022  8:20 AM

## 2022-09-15 NOTE — GROUP NOTE
IP  GROUP DOCUMENTATION INDIVIDUAL                                                                          Group Therapy Note    Date: 9/14/2022    Group Start Time: 2000  Group End Time: 2015  Group Topic: Medication    [facility] 1 ADULT CHEM Rolo Bridges RN    IP [facility] GROUP DOCUMENTATION GROUP    Group Therapy Note    Attendees: 6       Attendance: Attended    Patient's Goal:   med compliance. Interventions/techniques: Informed    Follows Directions:  Followed directions    Interactions: Interacted appropriately    Mental Status: Calm    Behavior/appearance: Cooperative    Goals Achieved: Able to listen to others      Additional Notes:      Wanda Perdue RN

## 2022-12-08 ENCOUNTER — APPOINTMENT (OUTPATIENT)
Dept: GENERAL RADIOLOGY | Age: 77
End: 2022-12-08
Attending: EMERGENCY MEDICINE

## 2022-12-08 ENCOUNTER — HOSPITAL ENCOUNTER (EMERGENCY)
Age: 77
Discharge: HOME | End: 2022-12-08
Attending: EMERGENCY MEDICINE

## 2022-12-08 ENCOUNTER — APPOINTMENT (OUTPATIENT)
Dept: CT IMAGING | Age: 77
End: 2022-12-08
Attending: EMERGENCY MEDICINE

## 2022-12-08 VITALS
TEMPERATURE: 97.5 F | DIASTOLIC BLOOD PRESSURE: 51 MMHG | WEIGHT: 170 LBS | SYSTOLIC BLOOD PRESSURE: 106 MMHG | RESPIRATION RATE: 18 BRPM | HEIGHT: 67 IN | OXYGEN SATURATION: 98 % | HEART RATE: 75 BPM | BODY MASS INDEX: 26.68 KG/M2

## 2022-12-08 DIAGNOSIS — S22.41XA CLOSED FRACTURE OF MULTIPLE RIBS OF RIGHT SIDE, INITIAL ENCOUNTER: Primary | ICD-10-CM
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
## 2022-12-08 PROCEDURE — 71250 CT THORAX DX C-: CPT

## 2022-12-08 PROCEDURE — 71100 X-RAY EXAM RIBS UNI 2 VIEWS: CPT

## 2022-12-08 PROCEDURE — 72125 CT NECK SPINE W/O DYE: CPT

## 2022-12-08 PROCEDURE — 93005 ELECTROCARDIOGRAM TRACING: CPT

## 2022-12-08 PROCEDURE — 70450 CT HEAD/BRAIN W/O DYE: CPT

## 2022-12-08 PROCEDURE — 74011250637 HC RX REV CODE- 250/637: Performed by: EMERGENCY MEDICINE

## 2022-12-08 PROCEDURE — 73502 X-RAY EXAM HIP UNI 2-3 VIEWS: CPT

## 2022-12-08 PROCEDURE — 99284 EMERGENCY DEPT VISIT MOD MDM: CPT

## 2022-12-08 RX ORDER — OXYCODONE AND ACETAMINOPHEN 5; 325 MG/1; MG/1
1 TABLET ORAL
Qty: 9 TABLET | Refills: 0 | Status: SHIPPED | OUTPATIENT
Start: 2022-12-08 | End: 2022-12-11

## 2022-12-08 RX ORDER — OXYCODONE AND ACETAMINOPHEN 5; 325 MG/1; MG/1
1 TABLET ORAL
Status: COMPLETED | OUTPATIENT
Start: 2022-12-08 | End: 2022-12-08

## 2022-12-08 RX ADMIN — OXYCODONE HYDROCHLORIDE AND ACETAMINOPHEN 1 TABLET: 5; 325 TABLET ORAL at 14:58
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
## 2022-12-09 LAB
ATRIAL RATE: 72 BPM
CALCULATED P AXIS, ECG09: 74 DEGREES
CALCULATED R AXIS, ECG10: 105 DEGREES
CALCULATED T AXIS, ECG11: 47 DEGREES
DIAGNOSIS, 93000: NORMAL
P-R INTERVAL, ECG05: 138 MS
Q-T INTERVAL, ECG07: 406 MS
QRS DURATION, ECG06: 74 MS
QTC CALCULATION (BEZET), ECG08: 444 MS
VENTRICULAR RATE, ECG03: 72 BPM

## 2023-04-26 ENCOUNTER — HOSPITAL ENCOUNTER (EMERGENCY)
Facility: HOSPITAL | Age: 78
Discharge: HOME | End: 2023-04-26
Payer: OTHER GOVERNMENT

## 2023-04-26 ENCOUNTER — APPOINTMENT (OUTPATIENT)
Facility: HOSPITAL | Age: 78
End: 2023-04-26
Payer: OTHER GOVERNMENT

## 2023-04-26 VITALS
RESPIRATION RATE: 20 BRPM | HEART RATE: 78 BPM | BODY MASS INDEX: 27.25 KG/M2 | DIASTOLIC BLOOD PRESSURE: 65 MMHG | TEMPERATURE: 97.8 F | OXYGEN SATURATION: 97 % | WEIGHT: 174 LBS | SYSTOLIC BLOOD PRESSURE: 111 MMHG

## 2023-04-26 DIAGNOSIS — I95.1 PRIMARY ORTHOSTATIC HYPOTENSION: Primary | ICD-10-CM

## 2023-04-26 DIAGNOSIS — R42 DIZZINESS: ICD-10-CM

## 2023-04-26 LAB
ALBUMIN SERPL-MCNC: 3.6 G/DL (ref 3.4–5)
ALBUMIN/GLOB SERPL: 0.7 (ref 0.8–1.7)
ALP SERPL-CCNC: 136 U/L (ref 45–117)
ALT SERPL-CCNC: 14 U/L (ref 16–61)
ANION GAP SERPL CALC-SCNC: 5 MMOL/L (ref 3–18)
AST SERPL-CCNC: 11 U/L (ref 10–38)
BASOPHILS # BLD: 0.1 K/UL (ref 0–0.1)
BASOPHILS NFR BLD: 1 % (ref 0–2)
BILIRUB SERPL-MCNC: 0.9 MG/DL (ref 0.2–1)
BUN SERPL-MCNC: 19 MG/DL (ref 7–18)
BUN/CREAT SERPL: 18 (ref 12–20)
CALCIUM SERPL-MCNC: 9.3 MG/DL (ref 8.5–10.1)
CHLORIDE SERPL-SCNC: 106 MMOL/L (ref 100–111)
CO2 SERPL-SCNC: 26 MMOL/L (ref 21–32)
CREAT SERPL-MCNC: 1.03 MG/DL (ref 0.6–1.3)
DIFFERENTIAL METHOD BLD: ABNORMAL
EKG ATRIAL RATE: 81 BPM
EKG DIAGNOSIS: NORMAL
EKG P AXIS: 55 DEGREES
EKG P-R INTERVAL: 176 MS
EKG Q-T INTERVAL: 386 MS
EKG QRS DURATION: 72 MS
EKG QTC CALCULATION (BAZETT): 448 MS
EKG R AXIS: 0 DEGREES
EKG T AXIS: 58 DEGREES
EKG VENTRICULAR RATE: 81 BPM
EOSINOPHIL # BLD: 0.2 K/UL (ref 0–0.4)
EOSINOPHIL NFR BLD: 3 % (ref 0–5)
ERYTHROCYTE [DISTWIDTH] IN BLOOD BY AUTOMATED COUNT: 13.4 % (ref 11.6–14.5)
GLOBULIN SER CALC-MCNC: 5 G/DL (ref 2–4)
GLUCOSE SERPL-MCNC: 228 MG/DL (ref 74–99)
HCT VFR BLD AUTO: 43.1 % (ref 36–48)
HGB BLD-MCNC: 14 G/DL (ref 13–16)
IMM GRANULOCYTES # BLD AUTO: 0 K/UL (ref 0–0.04)
IMM GRANULOCYTES NFR BLD AUTO: 1 % (ref 0–0.5)
LYMPHOCYTES # BLD: 1.7 K/UL (ref 0.9–3.6)
LYMPHOCYTES NFR BLD: 19 % (ref 21–52)
MCH RBC QN AUTO: 31.9 PG (ref 24–34)
MCHC RBC AUTO-ENTMCNC: 32.5 G/DL (ref 31–37)
MCV RBC AUTO: 98.2 FL (ref 78–100)
MONOCYTES # BLD: 0.5 K/UL (ref 0.05–1.2)
MONOCYTES NFR BLD: 6 % (ref 3–10)
NEUTS SEG # BLD: 6.3 K/UL (ref 1.8–8)
NEUTS SEG NFR BLD: 71 % (ref 40–73)
NRBC # BLD: 0 K/UL (ref 0–0.01)
NRBC BLD-RTO: 0 PER 100 WBC
NT PRO BNP: 151 PG/ML (ref 0–1800)
PLATELET # BLD AUTO: 333 K/UL (ref 135–420)
PMV BLD AUTO: 8.9 FL (ref 9.2–11.8)
POTASSIUM SERPL-SCNC: 4.2 MMOL/L (ref 3.5–5.5)
PROT SERPL-MCNC: 8.6 G/DL (ref 6.4–8.2)
RBC # BLD AUTO: 4.39 M/UL (ref 4.35–5.65)
SODIUM SERPL-SCNC: 137 MMOL/L (ref 136–145)
TROPONIN I SERPL HS-MCNC: 6 NG/L (ref 0–78)
WBC # BLD AUTO: 8.8 K/UL (ref 4.6–13.2)

## 2023-04-26 PROCEDURE — 93005 ELECTROCARDIOGRAM TRACING: CPT | Performed by: EMERGENCY MEDICINE

## 2023-04-26 PROCEDURE — 83880 ASSAY OF NATRIURETIC PEPTIDE: CPT

## 2023-04-26 PROCEDURE — 84484 ASSAY OF TROPONIN QUANT: CPT

## 2023-04-26 PROCEDURE — 93010 ELECTROCARDIOGRAM REPORT: CPT | Performed by: INTERNAL MEDICINE

## 2023-04-26 PROCEDURE — 85025 COMPLETE CBC W/AUTO DIFF WBC: CPT

## 2023-04-26 PROCEDURE — 2580000003 HC RX 258: Performed by: EMERGENCY MEDICINE

## 2023-04-26 PROCEDURE — 80053 COMPREHEN METABOLIC PANEL: CPT

## 2023-04-26 PROCEDURE — 99285 EMERGENCY DEPT VISIT HI MDM: CPT

## 2023-04-26 PROCEDURE — 71045 X-RAY EXAM CHEST 1 VIEW: CPT

## 2023-04-26 PROCEDURE — 6370000000 HC RX 637 (ALT 250 FOR IP): Performed by: EMERGENCY MEDICINE

## 2023-04-26 RX ORDER — 0.9 % SODIUM CHLORIDE 0.9 %
1000 INTRAVENOUS SOLUTION INTRAVENOUS ONCE
Status: COMPLETED | OUTPATIENT
Start: 2023-04-26 | End: 2023-04-26

## 2023-04-26 RX ORDER — MECLIZINE HCL 12.5 MG/1
12.5 TABLET ORAL
Status: COMPLETED | OUTPATIENT
Start: 2023-04-26 | End: 2023-04-26

## 2023-04-26 RX ADMIN — SODIUM CHLORIDE 1000 ML: 9 INJECTION, SOLUTION INTRAVENOUS at 14:29

## 2023-04-26 RX ADMIN — MECLIZINE 12.5 MG: 12.5 TABLET ORAL at 13:58

## 2023-04-26 ASSESSMENT — ENCOUNTER SYMPTOMS
COUGH: 1
SHORTNESS OF BREATH: 1

## 2023-04-26 ASSESSMENT — PAIN - FUNCTIONAL ASSESSMENT: PAIN_FUNCTIONAL_ASSESSMENT: NONE - DENIES PAIN

## 2023-04-26 NOTE — ED TRIAGE NOTES
Patient states history of dizziness  and started having light-headiness x 3 days. Denies any other concerns. He states last episode was 7 months ago and was placed on medication but he no longer has any. Denies nausea but DID have some nausea this morning.  He states history of COPD and regularly has SOB and uses inhalers

## 2023-04-26 NOTE — ED NOTES
Orthostatics completed.    Lying 94/56  Sitting 96/56  Standing 77/54     Abhijeet Lloyd RN  04/26/23 1194

## 2023-04-26 NOTE — ED NOTES
Discharge instructions reviewed with patient. Patient verbalized understanding. Patient advised to follow up as directed on discharge instructions. Patient denies questions, needs or concerns at this time. Patient verbalized understanding. No s/sx of distress noted.        Kenroy Quigley RN  04/26/23 8513

## 2023-04-26 NOTE — ED PROVIDER NOTES
guarding. Musculoskeletal:         General: Normal range of motion. Cervical back: Normal range of motion and neck supple. Right lower leg: No edema. Left lower leg: No edema. Skin:     General: Skin is warm and dry. Capillary Refill: Capillary refill takes less than 2 seconds. Neurological:      General: No focal deficit present. Mental Status: He is alert and oriented to person, place, and time. Psychiatric:         Mood and Affect: Mood normal.         Behavior: Behavior normal.       DIAGNOSTIC RESULTS     EKG: All EKG's are interpreted by the Emergency Department Physician who either signs or Co-signs this chart in the absence of a cardiologist.    Normal sinus rhythm, rate 81, QRS 72, Qtc 448, non specific st abnormalities    RADIOLOGY:   Non-plain film images such as CT, Ultrasound and MRI are read by the radiologist. Plain radiographic images are visualized and preliminarily interpreted by the emergency physician with the below findings:        Interpretation per the Radiologist below, if available at the time of this note:    XR CHEST PORTABLE   Final Result    IMPRESSION:      1. Sequela of moderate failure. Follow-up plain imaging of the chest.               ED BEDSIDE ULTRASOUND:   Performed by ED Physician - none    LABS:  Labs Reviewed   CBC WITH AUTO DIFFERENTIAL - Abnormal; Notable for the following components:       Result Value    MPV 8.9 (*)     Lymphocytes 19 (*)     Immature Granulocytes 1 (*)     All other components within normal limits   COMPREHENSIVE METABOLIC PANEL - Abnormal; Notable for the following components:    Glucose 228 (*)     BUN 19 (*)     ALT 14 (*)     Alk Phosphatase 136 (*)     Total Protein 8.6 (*)     Globulin 5.0 (*)     Albumin/Globulin Ratio 0.7 (*)     All other components within normal limits   TROPONIN   BRAIN NATRIURETIC PEPTIDE       All other labs were within normal range or not returned as of this dictation.     EMERGENCY

## 2024-08-09 ENCOUNTER — APPOINTMENT (OUTPATIENT)
Facility: HOSPITAL | Age: 79
End: 2024-08-09
Payer: OTHER GOVERNMENT

## 2024-08-09 ENCOUNTER — HOSPITAL ENCOUNTER (EMERGENCY)
Facility: HOSPITAL | Age: 79
Discharge: HOME | End: 2024-08-09
Attending: STUDENT IN AN ORGANIZED HEALTH CARE EDUCATION/TRAINING PROGRAM
Payer: OTHER GOVERNMENT

## 2024-08-09 VITALS
BODY MASS INDEX: 26.68 KG/M2 | TEMPERATURE: 97.4 F | HEART RATE: 90 BPM | SYSTOLIC BLOOD PRESSURE: 101 MMHG | DIASTOLIC BLOOD PRESSURE: 43 MMHG | RESPIRATION RATE: 16 BRPM | WEIGHT: 170 LBS | HEIGHT: 67 IN | OXYGEN SATURATION: 96 %

## 2024-08-09 DIAGNOSIS — H57.11 PAIN OF RIGHT EYE: Primary | ICD-10-CM

## 2024-08-09 PROCEDURE — 2500000003 HC RX 250 WO HCPCS: Performed by: STUDENT IN AN ORGANIZED HEALTH CARE EDUCATION/TRAINING PROGRAM

## 2024-08-09 PROCEDURE — 70450 CT HEAD/BRAIN W/O DYE: CPT

## 2024-08-09 PROCEDURE — 99284 EMERGENCY DEPT VISIT MOD MDM: CPT

## 2024-08-09 PROCEDURE — 6370000000 HC RX 637 (ALT 250 FOR IP): Performed by: STUDENT IN AN ORGANIZED HEALTH CARE EDUCATION/TRAINING PROGRAM

## 2024-08-09 RX ORDER — PROPARACAINE HYDROCHLORIDE 5 MG/ML
1 SOLUTION/ DROPS OPHTHALMIC
Status: COMPLETED | OUTPATIENT
Start: 2024-08-09 | End: 2024-08-09

## 2024-08-09 RX ADMIN — PROPARACAINE HYDROCHLORIDE 1 DROP: 5 SOLUTION/ DROPS OPHTHALMIC at 13:47

## 2024-08-09 RX ADMIN — FLUORESCEIN SODIUM 1 MG: 1 STRIP OPHTHALMIC at 13:47

## 2024-08-09 ASSESSMENT — PAIN SCALES - GENERAL
PAINLEVEL_OUTOF10: 10
PAINLEVEL_OUTOF10: 10

## 2024-08-09 ASSESSMENT — PAIN - FUNCTIONAL ASSESSMENT: PAIN_FUNCTIONAL_ASSESSMENT: 0-10

## 2024-08-09 ASSESSMENT — VISUAL ACUITY
OU: 20/200
OS: 20/200

## 2024-08-09 ASSESSMENT — PAIN DESCRIPTION - DESCRIPTORS
DESCRIPTORS: THROBBING
DESCRIPTORS: ACHING

## 2024-08-09 ASSESSMENT — PAIN DESCRIPTION - LOCATION
LOCATION: HEAD;EYE
LOCATION: EYE

## 2024-08-09 ASSESSMENT — PAIN DESCRIPTION - ORIENTATION
ORIENTATION: RIGHT
ORIENTATION: RIGHT

## 2024-08-09 NOTE — ED NOTES
Called [facility] @[phone] reached . I provided patient information. And she asked me to hold while she checked to see if the on call was available, she returned back to the line and sttd she will have to send the message via email and the to advise our attending the doctor will be calling back. Relayed information to Dr. Malone

## 2024-08-09 NOTE — ED PROVIDER NOTES
NOT CHANGED    Details   acetaminophen (TYLENOL) 500 MG tablet Take 1,000 mg by mouth every 6 hours as neededHistorical Med      albuterol sulfate HFA (PROVENTIL;VENTOLIN;PROAIR) 108 (90 Base) MCG/ACT inhaler Inhale 2 puffs into the lungs every 4 hours as neededHistorical Med      aspirin 81 MG chewable tablet Take 81 mg by mouth dailyHistorical Med      atorvastatin (LIPITOR) 80 MG tablet Take 80 mg by mouthHistorical Med      carvedilol (COREG) 6.25 MG tablet Take 3.125 mg by mouth 2 times dailyHistorical Med      vitamin D (CHOLECALCIFEROL) 25 MCG (1000 UT) TABS tablet Take 3,000 Units by mouth dailyHistorical Med      clopidogrel (PLAVIX) 75 MG tablet Take 75 mg by mouth dailyHistorical Med      cyanocobalamin 500 MCG tablet Take 100 mcg by mouth dailyHistorical Med      diclofenac sodium (VOLTAREN) 1 % GEL Apply 2 g topically daily, Topical, DAILY, Historical Med      empagliflozin (JARDIANCE) 10 MG tablet Take 10 mg by mouthHistorical Med      FLUoxetine (PROZAC) 20 MG capsule Take 20 mg by mouth dailyHistorical Med      fluticasone (FLONASE) 50 MCG/ACT nasal spray 2 sprays by Nasal route daily as neededHistorical Med      furosemide (LASIX) 20 MG tablet Take 20 mg by mouth dailyHistorical Med      insulin aspart (NOVOLOG) 100 UNIT/ML injection pen Inject 8 Units into the skin 3 times daily (before meals)Historical Med      insulin glargine (LANTUS SOLOSTAR) 100 UNIT/ML injection pen Inject 38 Units into the skinHistorical Med      albuterol-ipratropium (COMBIVENT RESPIMAT)  MCG/ACT AERS inhaler Inhale 1 puff into the lungs 4 times dailyHistorical Med      isosorbide mononitrate (IMDUR) 60 MG extended release tablet Take 30 mg by mouthHistorical Med      latanoprost (XALATAN) 0.005 % ophthalmic solution Apply 1 drop to eyeHistorical Med      lidocaine (LIDODERM) 5 % Apply patch to the affected area for 12 hours a day and remove for 12 hours a day.Historical Med      lidocaine-prilocaine (EMLA) 2.5-2.5 %

## 2024-08-09 NOTE — ED NOTES
Called [facility] to follow up, states they would send information to [facility]. Patent was last seen by [facility]. Awaiting call return. Dr. Malone informed.

## 2024-08-09 NOTE — ED TRIAGE NOTES
Patient arrived to ER with complaints of right headache and right eye pain that is intermittent. Pain is worse today, [facility].

## 2024-08-09 NOTE — DISCHARGE INSTRUCTIONS
Can increase your ketorolac (gray top) eyedrop to up to 4 times a day as needed for pain.  Consider using cold compresses on your eyes to help with discomfort.    Recommend taking extra strength Tylenol every 4-6 hours and ibuprofen 600 mg every 6 hours as needed for pain and discomfort.    Please follow-up with your eye doctor

## 2024-08-19 DIAGNOSIS — R91.1 NODULE OF UPPER LOBE OF RIGHT LUNG: Primary | ICD-10-CM

## 2024-08-30 ENCOUNTER — TELEPHONE (OUTPATIENT)
Facility: HOSPITAL | Age: 79
End: 2024-08-30

## 2024-08-30 NOTE — TELEPHONE ENCOUNTER
Patient's wife called to confirm lung biopsy scheduled for 9/3 at 0900 (arrive at 0700). All pre-procedure instructions reviewed with patient. He does not take plavix anymore and has already stopped his 81 mg ASA. Patient also takes BC powders- I advised him to refrain from taking any for 2 days prior to the lung biopsy. All questions answered.

## 2024-09-03 ENCOUNTER — HOSPITAL ENCOUNTER (OUTPATIENT)
Facility: HOSPITAL | Age: 79
Discharge: HOME | End: 2024-09-03
Attending: RADIOLOGY | Admitting: RADIOLOGY
Payer: OTHER GOVERNMENT

## 2024-09-03 ENCOUNTER — APPOINTMENT (OUTPATIENT)
Facility: HOSPITAL | Age: 79
End: 2024-09-03
Attending: RADIOLOGY
Payer: OTHER GOVERNMENT

## 2024-09-03 VITALS
TEMPERATURE: 97.5 F | SYSTOLIC BLOOD PRESSURE: 123 MMHG | BODY MASS INDEX: 26.28 KG/M2 | DIASTOLIC BLOOD PRESSURE: 58 MMHG | OXYGEN SATURATION: 96 % | HEART RATE: 74 BPM | RESPIRATION RATE: 18 BRPM | WEIGHT: 167.8 LBS

## 2024-09-03 DIAGNOSIS — R91.1 NODULE OF UPPER LOBE OF RIGHT LUNG: ICD-10-CM

## 2024-09-03 LAB
ANION GAP SERPL CALC-SCNC: 5 MMOL/L (ref 3–18)
APTT PPP: 32.1 SEC (ref 23–36.4)
BASOPHILS # BLD: 0.1 K/UL (ref 0–0.1)
BASOPHILS NFR BLD: 1 % (ref 0–2)
BUN SERPL-MCNC: 25 MG/DL (ref 7–18)
BUN/CREAT SERPL: 22 (ref 12–20)
CALCIUM SERPL-MCNC: 9.9 MG/DL (ref 8.5–10.1)
CHLORIDE SERPL-SCNC: 111 MMOL/L (ref 100–111)
CO2 SERPL-SCNC: 23 MMOL/L (ref 21–32)
CREAT SERPL-MCNC: 1.13 MG/DL (ref 0.6–1.3)
DIFFERENTIAL METHOD BLD: ABNORMAL
EOSINOPHIL # BLD: 0.2 K/UL (ref 0–0.4)
EOSINOPHIL NFR BLD: 2 % (ref 0–5)
ERYTHROCYTE [DISTWIDTH] IN BLOOD BY AUTOMATED COUNT: 14.2 % (ref 11.6–14.5)
GLUCOSE BLD STRIP.AUTO-MCNC: 125 MG/DL (ref 70–110)
GLUCOSE BLD STRIP.AUTO-MCNC: 136 MG/DL (ref 70–110)
GLUCOSE SERPL-MCNC: 159 MG/DL (ref 74–99)
HCT VFR BLD AUTO: 43.3 % (ref 36–48)
HGB BLD-MCNC: 13.6 G/DL (ref 13–16)
IMM GRANULOCYTES # BLD AUTO: 0.1 K/UL (ref 0–0.04)
IMM GRANULOCYTES NFR BLD AUTO: 1 % (ref 0–0.5)
INR PPP: 1.1 (ref 0.9–1.1)
LYMPHOCYTES # BLD: 1.9 K/UL (ref 0.9–3.6)
LYMPHOCYTES NFR BLD: 20 % (ref 21–52)
MCH RBC QN AUTO: 31.4 PG (ref 24–34)
MCHC RBC AUTO-ENTMCNC: 31.4 G/DL (ref 31–37)
MCV RBC AUTO: 100 FL (ref 78–100)
MONOCYTES # BLD: 1.1 K/UL (ref 0.05–1.2)
MONOCYTES NFR BLD: 11 % (ref 3–10)
NEUTS SEG # BLD: 6.3 K/UL (ref 1.8–8)
NEUTS SEG NFR BLD: 65 % (ref 40–73)
NRBC # BLD: 0 K/UL (ref 0–0.01)
NRBC BLD-RTO: 0 PER 100 WBC
PLATELET # BLD AUTO: 255 K/UL (ref 135–420)
PMV BLD AUTO: 8.8 FL (ref 9.2–11.8)
POTASSIUM SERPL-SCNC: 5 MMOL/L (ref 3.5–5.5)
PROTHROMBIN TIME: 14.3 SEC (ref 11.9–14.9)
RBC # BLD AUTO: 4.33 M/UL (ref 4.35–5.65)
SODIUM SERPL-SCNC: 139 MMOL/L (ref 136–145)
WBC # BLD AUTO: 9.7 K/UL (ref 4.6–13.2)

## 2024-09-03 PROCEDURE — 85730 THROMBOPLASTIN TIME PARTIAL: CPT

## 2024-09-03 PROCEDURE — 85610 PROTHROMBIN TIME: CPT

## 2024-09-03 PROCEDURE — 7100000010 HC PHASE II RECOVERY - FIRST 15 MIN

## 2024-09-03 PROCEDURE — 88333 PATH CONSLTJ SURG CYTO XM 1: CPT

## 2024-09-03 PROCEDURE — 88334 PATH CONSLTJ SURG CYTO XM EA: CPT

## 2024-09-03 PROCEDURE — 71045 X-RAY EXAM CHEST 1 VIEW: CPT

## 2024-09-03 PROCEDURE — 85025 COMPLETE CBC W/AUTO DIFF WBC: CPT

## 2024-09-03 PROCEDURE — 88305 TISSUE EXAM BY PATHOLOGIST: CPT

## 2024-09-03 PROCEDURE — 82962 GLUCOSE BLOOD TEST: CPT

## 2024-09-03 PROCEDURE — 6360000002 HC RX W HCPCS: Performed by: RADIOLOGY

## 2024-09-03 PROCEDURE — 99153 MOD SED SAME PHYS/QHP EA: CPT

## 2024-09-03 PROCEDURE — 7100000011 HC PHASE II RECOVERY - ADDTL 15 MIN

## 2024-09-03 PROCEDURE — 80048 BASIC METABOLIC PNL TOTAL CA: CPT

## 2024-09-03 RX ORDER — SODIUM CHLORIDE 9 MG/ML
INJECTION, SOLUTION INTRAVENOUS CONTINUOUS
Status: DISCONTINUED | OUTPATIENT
Start: 2024-09-03 | End: 2024-09-03 | Stop reason: HOSPADM

## 2024-09-03 RX ORDER — MIDAZOLAM HYDROCHLORIDE 1 MG/ML
INJECTION INTRAMUSCULAR; INTRAVENOUS
Status: DISCONTINUED
Start: 2024-09-03 | End: 2024-09-03 | Stop reason: HOSPADM

## 2024-09-03 RX ORDER — FENTANYL CITRATE 50 UG/ML
INJECTION, SOLUTION INTRAMUSCULAR; INTRAVENOUS
Status: DISCONTINUED
Start: 2024-09-03 | End: 2024-09-03 | Stop reason: HOSPADM

## 2024-09-03 RX ORDER — FENTANYL CITRATE 50 UG/ML
INJECTION, SOLUTION INTRAMUSCULAR; INTRAVENOUS PRN
Status: COMPLETED | OUTPATIENT
Start: 2024-09-03 | End: 2024-09-03

## 2024-09-03 RX ORDER — MIDAZOLAM HYDROCHLORIDE 2 MG/2ML
INJECTION, SOLUTION INTRAMUSCULAR; INTRAVENOUS PRN
Status: COMPLETED | OUTPATIENT
Start: 2024-09-03 | End: 2024-09-03

## 2024-09-03 RX ADMIN — FENTANYL CITRATE 50 MCG: 50 INJECTION INTRAMUSCULAR; INTRAVENOUS at 10:39

## 2024-09-03 RX ADMIN — MIDAZOLAM HYDROCHLORIDE 1 MG: 1 INJECTION, SOLUTION INTRAMUSCULAR; INTRAVENOUS at 10:40

## 2024-09-03 ASSESSMENT — PAIN SCALES - GENERAL: PAINLEVEL_OUTOF10: 0

## 2024-09-03 NOTE — H&P
History and Physical    Patient: Travis Rogel           Sex: male       DOA: 9/3/2024  YOB: 1945      Age:  79 y.o.     LOS:  LOS: 0 days        HPI:     Travis Rogel is a 79 y.o. male with history of COPD with recent imaging finding of right upper lobe lung nodule presents for right upper lobe lung nodule biopsy with moderate sedation     Past Medical History:   Diagnosis Date    Arthritis     Depression     Diabetes (HCC)     ETOH abuse     Psychiatric disorder     PTSD       Past Surgical History:   Procedure Laterality Date    WA UNLISTED PROCEDURE CARDIAC SURGERY         History reviewed. No pertinent family history.    Social History     Socioeconomic History    Marital status:      Spouse name: None    Number of children: None    Years of education: None    Highest education level: None   Tobacco Use    Smoking status: Some Days     Current packs/day: 1.00     Types: Cigarettes    Smokeless tobacco: Never   Substance and Sexual Activity    Alcohol use: Yes    Drug use: Yes     Types: Cocaine       Prior to Admission medications    Medication Sig Start Date End Date Taking? Authorizing Provider   acetaminophen (TYLENOL) 500 MG tablet Take 2 tablets by mouth every 6 hours as needed 6/29/22  Yes Automatic Reconciliation, Ar   albuterol sulfate HFA (PROVENTIL;VENTOLIN;PROAIR) 108 (90 Base) MCG/ACT inhaler Inhale 2 puffs into the lungs every 4 hours as needed 7/10/19  Yes Automatic Reconciliation, Ar   aspirin 81 MG chewable tablet Take 1 tablet by mouth daily 7/30/19  Yes Automatic Reconciliation, Ar   atorvastatin (LIPITOR) 80 MG tablet Take 1 tablet by mouth 4/10/19  Yes Automatic Reconciliation, Ar   carvedilol (COREG) 6.25 MG tablet Take 0.5 tablets by mouth 2 times daily 6/26/19  Yes Automatic Reconciliation, Ar   vitamin D (CHOLECALCIFEROL) 25 MCG (1000 UT) TABS tablet Take 3 tablets by mouth daily 5/31/19  Yes Automatic Reconciliation, Ar   clopidogrel (PLAVIX) 75 MG tablet

## 2024-09-03 NOTE — PROCEDURES
RADIOLOGY POST PROCEDURE NOTE     September 3, 2024       Preoperative Diagnosis:  RUL pulmonary nodule.    Postoperative Diagnosis:  Same.    :  Dr. Musa    Assistant:  None.    Type of Anesthesia: 1% plain lidocaine and IV moderate sedation with Versed and Fentanyl.    Procedure/Description:  Image guided RUL pulmonary nodule core needle Bx,    Findings:   No PTX or bleeding.    Estimated blood Loss:  Minimal    Specimen Removed:  Yes    Blood transfusions:  None.    Implants:  None.    Complications: None    Condition: Stable    Discharge Plan:  discharge home  if stable and no PTX. Resume blood thinners if any in 48 hours.    Bennett Musa MD

## 2024-09-03 NOTE — DISCHARGE INSTRUCTIONS
May Resume 81mg ASA and Plavix in 48 Hours     Percutaneous Lung Biopsy: What to Expect at Home  Your Recovery     A needle biopsy of the lung is a procedure to take a sample (biopsy) of lung tissue. The doctor puts a long needle through your chest wall to get the sample. Another doctor will look at the lung tissue with a microscope to check for infection, cancer, or other lung problems.  You may be sore where the doctor made the cut (incision) in your skin and put in the biopsy needle. You may feel some pain in your lung when you take a deep breath. These symptoms usually get better in a few days. If you cough up mucus, there may be streaks of blood in the mucus for the first week after the procedure.  You may need to take it easy at home for a day or two after the procedure. For 1 week, try to avoid heavy lifting and strenuous activities. These activities could cause bleeding from the biopsy site.  It can take several days to get the results of the biopsy. The doctor or nurse will discuss the results with you.  This care sheet gives you a general idea about how long it will take for you to recover. But each person recovers at a different pace. Follow the steps below to feel better as quickly as possible.  How can you care for yourself at home?  Activity    Rest when you feel tired. Getting enough sleep will help you recover.     Try to walk each day. Start by walking a little more than you did the day before. Bit by bit, increase the amount you walk. Walking boosts blood flow and helps prevent pneumonia and constipation.     Avoid strenuous activities, such as bicycle riding, jogging, weight lifting, or aerobic exercise, for 1 week or until your doctor says it is okay.     For 1 week, avoid lifting anything that would make you strain. This may include a child, heavy grocery bags and milk containers, a heavy briefcase or backpack, cat litter or dog food bags, or a vacuum .     Ask your doctor when you can

## 2024-09-03 NOTE — PROGRESS NOTES
TRANSFER - OUT REPORT:    Verbal report given to Jai DAMON on Travis Rogel  being transferred to Phase 2 for routine post-op       Report consisted of patient's Situation, Background, Assessment and   Recommendations(SBAR).     Information from the following report(s) Nurse Handoff Report was reviewed with the receiving nurse.           Lines:   Peripheral IV 09/03/24 Left Antecubital (Active)        Opportunity for questions and clarification was provided.      Patient transported with:  Registered Nurse

## 2025-02-14 ENCOUNTER — HOSPITAL ENCOUNTER (INPATIENT)
Facility: HOSPITAL | Age: 80
LOS: 4 days | Discharge: HOME HEALTH | DRG: 871 | End: 2025-02-18
Attending: EMERGENCY MEDICINE | Admitting: STUDENT IN AN ORGANIZED HEALTH CARE EDUCATION/TRAINING PROGRAM
Payer: OTHER GOVERNMENT

## 2025-02-14 ENCOUNTER — APPOINTMENT (OUTPATIENT)
Facility: HOSPITAL | Age: 80
DRG: 871 | End: 2025-02-14
Payer: OTHER GOVERNMENT

## 2025-02-14 DIAGNOSIS — R09.02 HYPOXIA: ICD-10-CM

## 2025-02-14 DIAGNOSIS — I42.9 CARDIOMYOPATHY, UNSPECIFIED TYPE (HCC): ICD-10-CM

## 2025-02-14 DIAGNOSIS — J18.9 PNEUMONIA DUE TO INFECTIOUS ORGANISM, UNSPECIFIED LATERALITY, UNSPECIFIED PART OF LUNG: Primary | ICD-10-CM

## 2025-02-14 PROBLEM — J44.9 COPD WITHOUT EXACERBATION (HCC): Status: ACTIVE | Noted: 2022-09-10

## 2025-02-14 PROBLEM — A41.9 SEPSIS WITH ACUTE HYPOXIC RESPIRATORY FAILURE WITHOUT SEPTIC SHOCK (HCC): Status: ACTIVE | Noted: 2025-02-14

## 2025-02-14 PROBLEM — J96.01 ACUTE RESPIRATORY FAILURE WITH HYPOXEMIA (HCC): Status: ACTIVE | Noted: 2025-02-14

## 2025-02-14 PROBLEM — I10 ESSENTIAL HYPERTENSION: Status: ACTIVE | Noted: 2022-09-10

## 2025-02-14 PROBLEM — C34.90 PRIMARY LUNG ADENOCARCINOMA (HCC): Status: ACTIVE | Noted: 2025-02-14

## 2025-02-14 PROBLEM — Z79.4 TYPE 2 DIABETES MELLITUS, WITH LONG-TERM CURRENT USE OF INSULIN (HCC): Status: ACTIVE | Noted: 2022-09-10

## 2025-02-14 PROBLEM — J96.01 SEPSIS WITH ACUTE HYPOXIC RESPIRATORY FAILURE WITHOUT SEPTIC SHOCK (HCC): Status: ACTIVE | Noted: 2025-02-14

## 2025-02-14 PROBLEM — R65.20 SEPSIS WITH ACUTE HYPOXIC RESPIRATORY FAILURE WITHOUT SEPTIC SHOCK (HCC): Status: ACTIVE | Noted: 2025-02-14

## 2025-02-14 PROBLEM — I10 ESSENTIAL HYPERTENSION: Status: RESOLVED | Noted: 2022-09-10 | Resolved: 2025-02-14

## 2025-02-14 PROBLEM — F33.40 RECURRENT MAJOR DEPRESSIVE DISORDER, IN REMISSION: Status: ACTIVE | Noted: 2025-02-14

## 2025-02-14 PROBLEM — E11.9 TYPE 2 DIABETES MELLITUS, WITH LONG-TERM CURRENT USE OF INSULIN (HCC): Status: ACTIVE | Noted: 2022-09-10

## 2025-02-14 PROBLEM — E87.20 LACTIC ACIDEMIA: Status: ACTIVE | Noted: 2025-02-14

## 2025-02-14 PROBLEM — N18.2 CKD (CHRONIC KIDNEY DISEASE), STAGE II: Status: ACTIVE | Noted: 2025-02-14

## 2025-02-14 PROBLEM — I25.10 CORONARY ARTERY DISEASE INVOLVING NATIVE CORONARY ARTERY OF NATIVE HEART WITHOUT ANGINA PECTORIS: Status: ACTIVE | Noted: 2025-02-14

## 2025-02-14 LAB
ALBUMIN SERPL-MCNC: 2.8 G/DL (ref 3.4–5)
ALBUMIN/GLOB SERPL: 0.4 (ref 0.8–1.7)
ALP SERPL-CCNC: 157 U/L (ref 45–117)
ALT SERPL-CCNC: 13 U/L (ref 16–61)
ANION GAP BLD CALC-SCNC: ABNORMAL MMOL/L (ref 10–20)
ANION GAP SERPL CALC-SCNC: 6 MMOL/L (ref 3–18)
APPEARANCE UR: CLEAR
ARTERIAL PATENCY WRIST A: POSITIVE
AST SERPL-CCNC: 21 U/L (ref 10–38)
B PERT DNA SPEC QL NAA+PROBE: NOT DETECTED
BASE DEFICIT BLD-SCNC: 4.9 MMOL/L
BASOPHILS # BLD: 0.05 K/UL (ref 0–0.1)
BASOPHILS NFR BLD: 0.5 % (ref 0–2)
BDY SITE: ABNORMAL
BILIRUB SERPL-MCNC: 1.2 MG/DL (ref 0.2–1)
BILIRUB UR QL: NEGATIVE
BORDETELLA PARAPERTUSSIS BY PCR: NOT DETECTED
BUN SERPL-MCNC: 24 MG/DL (ref 7–18)
BUN/CREAT SERPL: 21 (ref 12–20)
C PNEUM DNA SPEC QL NAA+PROBE: NOT DETECTED
CA-I BLD-MCNC: 1.17 MMOL/L (ref 1.15–1.33)
CALCIUM SERPL-MCNC: 9.2 MG/DL (ref 8.5–10.1)
CHLORIDE BLD-SCNC: 113 MMOL/L (ref 98–107)
CHLORIDE SERPL-SCNC: 108 MMOL/L (ref 100–111)
CO2 SERPL-SCNC: 23 MMOL/L (ref 21–32)
COLOR UR: YELLOW
CREAT BLD-MCNC: 0.92 MG/DL (ref 0.6–1.3)
CREAT SERPL-MCNC: 1.15 MG/DL (ref 0.6–1.3)
DIFFERENTIAL METHOD BLD: ABNORMAL
EKG ATRIAL RATE: 107 BPM
EKG DIAGNOSIS: NORMAL
EKG P AXIS: 60 DEGREES
EKG P-R INTERVAL: 162 MS
EKG Q-T INTERVAL: 342 MS
EKG QRS DURATION: 66 MS
EKG QTC CALCULATION (BAZETT): 456 MS
EKG R AXIS: 15 DEGREES
EKG T AXIS: 54 DEGREES
EKG VENTRICULAR RATE: 107 BPM
EOSINOPHIL # BLD: 0.18 K/UL (ref 0–0.4)
EOSINOPHIL NFR BLD: 1.6 % (ref 0–5)
ERYTHROCYTE [DISTWIDTH] IN BLOOD BY AUTOMATED COUNT: 14.6 % (ref 11.6–14.5)
FLUAV SUBTYP SPEC NAA+PROBE: NOT DETECTED
FLUBV RNA SPEC QL NAA+PROBE: NOT DETECTED
GAS FLOW.O2 O2 DELIVERY SYS: ABNORMAL
GAS FLOW.O2 SETTING OXYMISER: 35 BPM
GLOBULIN SER CALC-MCNC: 6.3 G/DL (ref 2–4)
GLUCOSE BLD STRIP.AUTO-MCNC: 151 MG/DL (ref 70–110)
GLUCOSE BLD STRIP.AUTO-MCNC: 216 MG/DL (ref 70–110)
GLUCOSE BLD-MCNC: 368 MG/DL (ref 74–99)
GLUCOSE SERPL-MCNC: 328 MG/DL (ref 74–99)
GLUCOSE UR STRIP.AUTO-MCNC: >1000 MG/DL
HADV DNA SPEC QL NAA+PROBE: NOT DETECTED
HCO3 BLD-SCNC: 18.3 MMOL/L (ref 21–28)
HCOV 229E RNA SPEC QL NAA+PROBE: NOT DETECTED
HCOV HKU1 RNA SPEC QL NAA+PROBE: NOT DETECTED
HCOV NL63 RNA SPEC QL NAA+PROBE: NOT DETECTED
HCOV OC43 RNA SPEC QL NAA+PROBE: NOT DETECTED
HCT VFR BLD AUTO: 44.6 % (ref 36–48)
HGB BLD-MCNC: 14.3 G/DL (ref 13–16)
HGB UR QL STRIP: NEGATIVE
HMPV RNA SPEC QL NAA+PROBE: NOT DETECTED
HPIV1 RNA SPEC QL NAA+PROBE: NOT DETECTED
HPIV2 RNA SPEC QL NAA+PROBE: NOT DETECTED
HPIV3 RNA SPEC QL NAA+PROBE: NOT DETECTED
HPIV4 RNA SPEC QL NAA+PROBE: NOT DETECTED
IMM GRANULOCYTES # BLD AUTO: 0.06 K/UL (ref 0–0.04)
IMM GRANULOCYTES NFR BLD AUTO: 0.5 % (ref 0–0.5)
KETONES UR QL STRIP.AUTO: ABNORMAL MG/DL
L PNEUMO AG UR QL IA: NEGATIVE
LACTATE BLD-SCNC: 1.05 MMOL/L (ref 0.4–2)
LACTATE BLD-SCNC: 2.36 MMOL/L (ref 0.4–2)
LEUKOCYTE ESTERASE UR QL STRIP.AUTO: NEGATIVE
LYMPHOCYTES # BLD: 0.72 K/UL (ref 0.9–3.6)
LYMPHOCYTES NFR BLD: 6.6 % (ref 21–52)
M PNEUMO DNA SPEC QL NAA+PROBE: NOT DETECTED
MCH RBC QN AUTO: 31.2 PG (ref 24–34)
MCHC RBC AUTO-ENTMCNC: 32.1 G/DL (ref 31–37)
MCV RBC AUTO: 97.2 FL (ref 78–100)
MONOCYTES # BLD: 0.85 K/UL (ref 0.05–1.2)
MONOCYTES NFR BLD: 7.8 % (ref 3–10)
NEUTS SEG # BLD: 9.05 K/UL (ref 1.8–8)
NEUTS SEG NFR BLD: 83 % (ref 40–73)
NITRITE UR QL STRIP.AUTO: NEGATIVE
NRBC # BLD: 0 K/UL (ref 0–0.01)
NRBC BLD-RTO: 0 PER 100 WBC
NT PRO BNP: 387 PG/ML (ref 0–1800)
O2/TOTAL GAS SETTING VFR VENT: 28 %
PCO2 BLD: 28.4 MMHG (ref 35–48)
PH BLD: 7.42 (ref 7.35–7.45)
PH UR STRIP: 5 (ref 5–8)
PLATELET # BLD AUTO: 383 K/UL (ref 135–420)
PMV BLD AUTO: 9.6 FL (ref 9.2–11.8)
PO2 BLD: 61 MMHG (ref 83–108)
POTASSIUM BLD-SCNC: 5.6 MMOL/L (ref 3.5–5.1)
POTASSIUM SERPL-SCNC: 4.8 MMOL/L (ref 3.5–5.5)
PROCALCITONIN SERPL-MCNC: 0.59 NG/ML
PROT SERPL-MCNC: 9.1 G/DL (ref 6.4–8.2)
PROT UR STRIP-MCNC: NEGATIVE MG/DL
RBC # BLD AUTO: 4.59 M/UL (ref 4.35–5.65)
RESPIRATORY RATE, POC: 35 (ref 5–40)
RSV RNA SPEC QL NAA+PROBE: NOT DETECTED
RV+EV RNA SPEC QL NAA+PROBE: NOT DETECTED
S PNEUM AG UR QL: NEGATIVE
SAO2 % BLD: 92 % (ref 92–97)
SARS-COV-2 RNA RESP QL NAA+PROBE: NOT DETECTED
SERVICE CMNT-IMP: ABNORMAL
SERVICE CMNT-IMP: ABNORMAL
SODIUM BLD-SCNC: 143 MMOL/L (ref 136–145)
SODIUM SERPL-SCNC: 137 MMOL/L (ref 136–145)
SP GR UR REFRACTOMETRY: 1.03 (ref 1–1.03)
SPECIMEN SITE: ABNORMAL
SPECIMEN TYPE: ABNORMAL
UROBILINOGEN UR QL STRIP.AUTO: 0.2 EU/DL (ref 0.2–1)
WBC # BLD AUTO: 10.9 K/UL (ref 4.6–13.2)

## 2025-02-14 PROCEDURE — 93010 ELECTROCARDIOGRAM REPORT: CPT | Performed by: INTERNAL MEDICINE

## 2025-02-14 PROCEDURE — 6360000002 HC RX W HCPCS

## 2025-02-14 PROCEDURE — 6360000002 HC RX W HCPCS: Performed by: STUDENT IN AN ORGANIZED HEALTH CARE EDUCATION/TRAINING PROGRAM

## 2025-02-14 PROCEDURE — 82803 BLOOD GASES ANY COMBINATION: CPT

## 2025-02-14 PROCEDURE — 71045 X-RAY EXAM CHEST 1 VIEW: CPT

## 2025-02-14 PROCEDURE — 83605 ASSAY OF LACTIC ACID: CPT

## 2025-02-14 PROCEDURE — 84145 PROCALCITONIN (PCT): CPT

## 2025-02-14 PROCEDURE — 94640 AIRWAY INHALATION TREATMENT: CPT

## 2025-02-14 PROCEDURE — 93005 ELECTROCARDIOGRAM TRACING: CPT | Performed by: EMERGENCY MEDICINE

## 2025-02-14 PROCEDURE — 96374 THER/PROPH/DIAG INJ IV PUSH: CPT

## 2025-02-14 PROCEDURE — 36600 WITHDRAWAL OF ARTERIAL BLOOD: CPT

## 2025-02-14 PROCEDURE — 80053 COMPREHEN METABOLIC PANEL: CPT

## 2025-02-14 PROCEDURE — 2580000003 HC RX 258: Performed by: STUDENT IN AN ORGANIZED HEALTH CARE EDUCATION/TRAINING PROGRAM

## 2025-02-14 PROCEDURE — 87449 NOS EACH ORGANISM AG IA: CPT

## 2025-02-14 PROCEDURE — 2140000001 HC CVICU INTERMEDIATE R&B

## 2025-02-14 PROCEDURE — 99223 1ST HOSP IP/OBS HIGH 75: CPT | Performed by: STUDENT IN AN ORGANIZED HEALTH CARE EDUCATION/TRAINING PROGRAM

## 2025-02-14 PROCEDURE — 81003 URINALYSIS AUTO W/O SCOPE: CPT

## 2025-02-14 PROCEDURE — 99222 1ST HOSP IP/OBS MODERATE 55: CPT | Performed by: HOSPITALIST

## 2025-02-14 PROCEDURE — 6370000000 HC RX 637 (ALT 250 FOR IP): Performed by: STUDENT IN AN ORGANIZED HEALTH CARE EDUCATION/TRAINING PROGRAM

## 2025-02-14 PROCEDURE — 71250 CT THORAX DX C-: CPT

## 2025-02-14 PROCEDURE — 2700000000 HC OXYGEN THERAPY PER DAY

## 2025-02-14 PROCEDURE — 82962 GLUCOSE BLOOD TEST: CPT

## 2025-02-14 PROCEDURE — 0202U NFCT DS 22 TRGT SARS-COV-2: CPT

## 2025-02-14 PROCEDURE — 85025 COMPLETE CBC W/AUTO DIFF WBC: CPT

## 2025-02-14 PROCEDURE — 6370000000 HC RX 637 (ALT 250 FOR IP)

## 2025-02-14 PROCEDURE — 99285 EMERGENCY DEPT VISIT HI MDM: CPT

## 2025-02-14 PROCEDURE — 83880 ASSAY OF NATRIURETIC PEPTIDE: CPT

## 2025-02-14 PROCEDURE — 87040 BLOOD CULTURE FOR BACTERIA: CPT

## 2025-02-14 PROCEDURE — 6360000002 HC RX W HCPCS: Performed by: EMERGENCY MEDICINE

## 2025-02-14 PROCEDURE — 2500000003 HC RX 250 WO HCPCS: Performed by: STUDENT IN AN ORGANIZED HEALTH CARE EDUCATION/TRAINING PROGRAM

## 2025-02-14 PROCEDURE — 2580000003 HC RX 258: Performed by: EMERGENCY MEDICINE

## 2025-02-14 PROCEDURE — 80047 BASIC METABLC PNL IONIZED CA: CPT

## 2025-02-14 RX ORDER — ARFORMOTEROL TARTRATE 15 UG/2ML
15 SOLUTION RESPIRATORY (INHALATION)
Status: DISCONTINUED | OUTPATIENT
Start: 2025-02-14 | End: 2025-02-18 | Stop reason: HOSPADM

## 2025-02-14 RX ORDER — INSULIN LISPRO 100 [IU]/ML
0-4 INJECTION, SOLUTION INTRAVENOUS; SUBCUTANEOUS
Status: DISCONTINUED | OUTPATIENT
Start: 2025-02-14 | End: 2025-02-18 | Stop reason: HOSPADM

## 2025-02-14 RX ORDER — PREGABALIN 50 MG/1
150 CAPSULE ORAL 2 TIMES DAILY
Status: DISCONTINUED | OUTPATIENT
Start: 2025-02-14 | End: 2025-02-18 | Stop reason: HOSPADM

## 2025-02-14 RX ORDER — GUAIFENESIN/DEXTROMETHORPHAN 100-10MG/5
5 SYRUP ORAL EVERY 4 HOURS PRN
Status: DISCONTINUED | OUTPATIENT
Start: 2025-02-14 | End: 2025-02-18 | Stop reason: HOSPADM

## 2025-02-14 RX ORDER — FUROSEMIDE 10 MG/ML
40 INJECTION INTRAMUSCULAR; INTRAVENOUS ONCE
Status: COMPLETED | OUTPATIENT
Start: 2025-02-14 | End: 2025-02-14

## 2025-02-14 RX ORDER — ONDANSETRON 4 MG/1
4 TABLET, ORALLY DISINTEGRATING ORAL EVERY 8 HOURS PRN
Status: DISCONTINUED | OUTPATIENT
Start: 2025-02-14 | End: 2025-02-18 | Stop reason: HOSPADM

## 2025-02-14 RX ORDER — ACETAMINOPHEN 325 MG/1
650 TABLET ORAL EVERY 6 HOURS PRN
Status: DISCONTINUED | OUTPATIENT
Start: 2025-02-14 | End: 2025-02-18 | Stop reason: HOSPADM

## 2025-02-14 RX ORDER — SODIUM CHLORIDE 0.9 % (FLUSH) 0.9 %
5-40 SYRINGE (ML) INJECTION EVERY 12 HOURS SCHEDULED
Status: DISCONTINUED | OUTPATIENT
Start: 2025-02-14 | End: 2025-02-18 | Stop reason: HOSPADM

## 2025-02-14 RX ORDER — DEXTROSE MONOHYDRATE 100 MG/ML
INJECTION, SOLUTION INTRAVENOUS CONTINUOUS PRN
Status: DISCONTINUED | OUTPATIENT
Start: 2025-02-14 | End: 2025-02-18 | Stop reason: HOSPADM

## 2025-02-14 RX ORDER — SODIUM CHLORIDE 9 MG/ML
INJECTION, SOLUTION INTRAVENOUS PRN
Status: DISCONTINUED | OUTPATIENT
Start: 2025-02-14 | End: 2025-02-18 | Stop reason: HOSPADM

## 2025-02-14 RX ORDER — VITAMIN B COMPLEX
3000 TABLET ORAL DAILY
Status: DISCONTINUED | OUTPATIENT
Start: 2025-02-14 | End: 2025-02-18 | Stop reason: HOSPADM

## 2025-02-14 RX ORDER — CARVEDILOL 3.12 MG/1
3.12 TABLET ORAL 2 TIMES DAILY
Status: DISCONTINUED | OUTPATIENT
Start: 2025-02-14 | End: 2025-02-18 | Stop reason: HOSPADM

## 2025-02-14 RX ORDER — ONDANSETRON 2 MG/ML
4 INJECTION INTRAMUSCULAR; INTRAVENOUS EVERY 6 HOURS PRN
Status: DISCONTINUED | OUTPATIENT
Start: 2025-02-14 | End: 2025-02-18 | Stop reason: HOSPADM

## 2025-02-14 RX ORDER — INSULIN GLARGINE 100 [IU]/ML
15 INJECTION, SOLUTION SUBCUTANEOUS NIGHTLY
Status: DISCONTINUED | OUTPATIENT
Start: 2025-02-14 | End: 2025-02-18 | Stop reason: HOSPADM

## 2025-02-14 RX ORDER — IPRATROPIUM BROMIDE AND ALBUTEROL SULFATE 2.5; .5 MG/3ML; MG/3ML
1 SOLUTION RESPIRATORY (INHALATION)
Status: DISCONTINUED | OUTPATIENT
Start: 2025-02-14 | End: 2025-02-16

## 2025-02-14 RX ORDER — INSULIN LISPRO 100 [IU]/ML
8 INJECTION, SOLUTION INTRAVENOUS; SUBCUTANEOUS
Status: DISCONTINUED | OUTPATIENT
Start: 2025-02-14 | End: 2025-02-14

## 2025-02-14 RX ORDER — ATORVASTATIN CALCIUM 40 MG/1
80 TABLET, FILM COATED ORAL NIGHTLY
Status: DISCONTINUED | OUTPATIENT
Start: 2025-02-14 | End: 2025-02-18 | Stop reason: HOSPADM

## 2025-02-14 RX ORDER — ACETAMINOPHEN 650 MG/1
650 SUPPOSITORY RECTAL EVERY 6 HOURS PRN
Status: DISCONTINUED | OUTPATIENT
Start: 2025-02-14 | End: 2025-02-18 | Stop reason: HOSPADM

## 2025-02-14 RX ORDER — CLOPIDOGREL BISULFATE 75 MG/1
75 TABLET ORAL DAILY
Status: DISCONTINUED | OUTPATIENT
Start: 2025-02-14 | End: 2025-02-18 | Stop reason: HOSPADM

## 2025-02-14 RX ORDER — INSULIN LISPRO 100 [IU]/ML
5 INJECTION, SOLUTION INTRAVENOUS; SUBCUTANEOUS
Status: DISCONTINUED | OUTPATIENT
Start: 2025-02-14 | End: 2025-02-18 | Stop reason: HOSPADM

## 2025-02-14 RX ORDER — POLYETHYLENE GLYCOL 3350 17 G/17G
17 POWDER, FOR SOLUTION ORAL DAILY PRN
Status: DISCONTINUED | OUTPATIENT
Start: 2025-02-14 | End: 2025-02-18 | Stop reason: HOSPADM

## 2025-02-14 RX ORDER — POTASSIUM CHLORIDE 1500 MG/1
40 TABLET, EXTENDED RELEASE ORAL PRN
Status: DISCONTINUED | OUTPATIENT
Start: 2025-02-14 | End: 2025-02-18 | Stop reason: HOSPADM

## 2025-02-14 RX ORDER — LATANOPROST 50 UG/ML
1 SOLUTION/ DROPS OPHTHALMIC DAILY
Status: DISCONTINUED | OUTPATIENT
Start: 2025-02-14 | End: 2025-02-18 | Stop reason: HOSPADM

## 2025-02-14 RX ORDER — FUROSEMIDE 20 MG/1
20 TABLET ORAL DAILY
Status: DISCONTINUED | OUTPATIENT
Start: 2025-02-14 | End: 2025-02-14

## 2025-02-14 RX ORDER — ASPIRIN 81 MG/1
81 TABLET, CHEWABLE ORAL DAILY
Status: DISCONTINUED | OUTPATIENT
Start: 2025-02-14 | End: 2025-02-18 | Stop reason: HOSPADM

## 2025-02-14 RX ORDER — MAGNESIUM SULFATE IN WATER 40 MG/ML
2000 INJECTION, SOLUTION INTRAVENOUS PRN
Status: DISCONTINUED | OUTPATIENT
Start: 2025-02-14 | End: 2025-02-18 | Stop reason: HOSPADM

## 2025-02-14 RX ORDER — INSULIN GLARGINE 100 [IU]/ML
25 INJECTION, SOLUTION SUBCUTANEOUS NIGHTLY
Status: DISCONTINUED | OUTPATIENT
Start: 2025-02-14 | End: 2025-02-14

## 2025-02-14 RX ORDER — BUDESONIDE 1 MG/2ML
1 INHALANT ORAL 2 TIMES DAILY
Status: DISCONTINUED | OUTPATIENT
Start: 2025-02-14 | End: 2025-02-18 | Stop reason: HOSPADM

## 2025-02-14 RX ORDER — IPRATROPIUM BROMIDE AND ALBUTEROL SULFATE 2.5; .5 MG/3ML; MG/3ML
1 SOLUTION RESPIRATORY (INHALATION) EVERY 4 HOURS PRN
Status: DISCONTINUED | OUTPATIENT
Start: 2025-02-14 | End: 2025-02-18 | Stop reason: HOSPADM

## 2025-02-14 RX ORDER — ENOXAPARIN SODIUM 100 MG/ML
40 INJECTION SUBCUTANEOUS DAILY
Status: DISCONTINUED | OUTPATIENT
Start: 2025-02-14 | End: 2025-02-18 | Stop reason: HOSPADM

## 2025-02-14 RX ORDER — POTASSIUM CHLORIDE 7.45 MG/ML
10 INJECTION INTRAVENOUS PRN
Status: DISCONTINUED | OUTPATIENT
Start: 2025-02-14 | End: 2025-02-18 | Stop reason: HOSPADM

## 2025-02-14 RX ORDER — VANCOMYCIN 1.25 G/250ML
1750 INJECTION, SOLUTION INTRAVENOUS
Status: COMPLETED | OUTPATIENT
Start: 2025-02-14 | End: 2025-02-14

## 2025-02-14 RX ORDER — UBIDECARENONE 75 MG
100 CAPSULE ORAL DAILY
Status: DISCONTINUED | OUTPATIENT
Start: 2025-02-14 | End: 2025-02-18 | Stop reason: HOSPADM

## 2025-02-14 RX ORDER — SODIUM CHLORIDE 0.9 % (FLUSH) 0.9 %
5-40 SYRINGE (ML) INJECTION PRN
Status: DISCONTINUED | OUTPATIENT
Start: 2025-02-14 | End: 2025-02-18 | Stop reason: HOSPADM

## 2025-02-14 RX ORDER — PANTOPRAZOLE SODIUM 40 MG/1
40 TABLET, DELAYED RELEASE ORAL
Status: DISCONTINUED | OUTPATIENT
Start: 2025-02-15 | End: 2025-02-18 | Stop reason: HOSPADM

## 2025-02-14 RX ORDER — BUDESONIDE 1 MG/2ML
1 INHALANT ORAL 2 TIMES DAILY
Status: DISCONTINUED | OUTPATIENT
Start: 2025-02-14 | End: 2025-02-14

## 2025-02-14 RX ADMIN — IPRATROPIUM BROMIDE AND ALBUTEROL SULFATE 1 DOSE: .5; 3 SOLUTION RESPIRATORY (INHALATION) at 15:46

## 2025-02-14 RX ADMIN — ACETAMINOPHEN 650 MG: 325 TABLET ORAL at 20:40

## 2025-02-14 RX ADMIN — ENOXAPARIN SODIUM 40 MG: 100 INJECTION SUBCUTANEOUS at 14:55

## 2025-02-14 RX ADMIN — PIPERACILLIN AND TAZOBACTAM 4500 MG: 4; .5 INJECTION, POWDER, FOR SOLUTION INTRAVENOUS at 11:46

## 2025-02-14 RX ADMIN — CEFEPIME 2000 MG: 2 INJECTION, POWDER, FOR SOLUTION INTRAVENOUS at 16:41

## 2025-02-14 RX ADMIN — BUDESONIDE 1 MG: 1 SUSPENSION RESPIRATORY (INHALATION) at 21:31

## 2025-02-14 RX ADMIN — VANCOMYCIN 1750 MG: 1.25 INJECTION, SOLUTION INTRAVENOUS at 14:53

## 2025-02-14 RX ADMIN — LATANOPROST 1 DROP: 50 SOLUTION OPHTHALMIC at 21:10

## 2025-02-14 RX ADMIN — INSULIN LISPRO 5 UNITS: 100 INJECTION, SOLUTION INTRAVENOUS; SUBCUTANEOUS at 16:52

## 2025-02-14 RX ADMIN — FUROSEMIDE 40 MG: 10 INJECTION, SOLUTION INTRAMUSCULAR; INTRAVENOUS at 14:55

## 2025-02-14 RX ADMIN — INSULIN LISPRO 1 UNITS: 100 INJECTION, SOLUTION INTRAVENOUS; SUBCUTANEOUS at 16:52

## 2025-02-14 RX ADMIN — PREGABALIN 150 MG: 75 CAPSULE ORAL at 14:55

## 2025-02-14 RX ADMIN — IPRATROPIUM BROMIDE AND ALBUTEROL SULFATE 1 DOSE: .5; 3 SOLUTION RESPIRATORY (INHALATION) at 21:31

## 2025-02-14 RX ADMIN — SODIUM CHLORIDE, PRESERVATIVE FREE 10 ML: 5 INJECTION INTRAVENOUS at 20:43

## 2025-02-14 RX ADMIN — INSULIN GLARGINE 15 UNITS: 100 INJECTION, SOLUTION SUBCUTANEOUS at 20:41

## 2025-02-14 RX ADMIN — PREGABALIN 150 MG: 75 CAPSULE ORAL at 20:40

## 2025-02-14 RX ADMIN — ATORVASTATIN CALCIUM 80 MG: 40 TABLET, FILM COATED ORAL at 20:40

## 2025-02-14 RX ADMIN — CARVEDILOL 3.12 MG: 3.12 TABLET, FILM COATED ORAL at 20:40

## 2025-02-14 RX ADMIN — ARFORMOTEROL TARTRATE 15 MCG: 15 SOLUTION RESPIRATORY (INHALATION) at 21:31

## 2025-02-14 ASSESSMENT — PAIN SCALES - GENERAL
PAINLEVEL_OUTOF10: 8
PAINLEVEL_OUTOF10: 0

## 2025-02-14 ASSESSMENT — PAIN DESCRIPTION - LOCATION: LOCATION: HEAD

## 2025-02-14 ASSESSMENT — PAIN DESCRIPTION - DESCRIPTORS: DESCRIPTORS: ACHING

## 2025-02-14 NOTE — H&P
History and Physical          Chief Complaint: \" I've been sick \"     HPI: The patient is a pleasant 80-year-old male with primary lung adenocarcinoma, COPD, coronary artery disease status post CABG, type 2 diabetes mellitus, suspected stage II chronic kidney disease, visual impairment and depression who presented to the emergency department today with complaints of shortness of breath.  He was accompanied by his wife who provided most of the history.  The patient has chronic shortness of breath from COPD but for the last few days, he has had increasing dyspnea.  The patient also complained of a cough with yellowish expectoration.  His wife said that she also saw a few streaks of blood mixed with sputum yesterday.  He reported associated bilateral chest pain which he described as a \"lump \", as well as chills.  The patient could not endorse any aggravating or relieving factors for the chest pain.  He denied any fever, diaphoresis, nausea, vomiting, orthopnea, PND, runny eyes, runny nose, sore throat, palpitations or lower extremity swelling.  He also denied choking on food or oral secretions.  The patient was diagnosed with lung cancer back in September 2024.  He underwent radiation therapy in January 2025 in [place].  The patient has a greater than 50-pack-year history but he quit tobacco 2 years ago.  He drinks alcohol occasionally but denied any recreational drug use.  The patient would like his wife to be his surrogate decision-maker.  His CODE STATUS is DNR.      In the ED, the patient was afebrile but tachycardic and tachypneic.  He was also noted to be hypoxic on room air and was placed on high flow nasal cannula.  Lab work was notable for a blood glucose level of 328 and a lactic acid level of 2.36.  The BNP level was 387.  Chest x-ray revealed bilateral infiltrates, concerning for pneumonia.  The patient is being admitted to the hospital for management of sepsis and acute respiratory failure secondary

## 2025-02-14 NOTE — CONSULTS
[facility]  Pulmonary, Critical Care, and Sleep Medicine    Name: Travis Rogel MRN: [id number]   : 1945 Hospital: [facility]   Date: 2025        Pulmonary Medicine: Initial Patient Consult    Admission Date:   2025  LOS: 0  MAR reviewed and pertinent medications noted or modified as needed    IMPRESSION:   Acute Hypoxic respiratory failure, SOB on presentation, ABG with hypoxemia, pO2 61% on FiO2 28%  Lung Cancer with Biopsy on 24, highly suspicious for adenocarcinoma,  s/p radiation therapy 2025. [facility]         COPD, possible exacerbation with hypoxia and increased sputum production. No PFT on record, [facility].  History of tobacco use disorder with 50 pack year, quit in . Hx of of cocaine use disorder  CAD s/p CABG  DM2 with hyperglycemia, last A1-C was 6.0% in   GERD  Basilar atelectasis vs effusion secondary to infiltrate vs edema.  Elevated alkaline phosphate, biliary disease vs bony breakdown    CODE STATUS: DNR No additional code details       Patient Active Problem List   Diagnosis    Community acquired pneumonia    Moderate episode of recurrent major depressive disorder (HCC)    Chronic post-traumatic stress 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    Cocaine use disorder, severe, dependence (HCC)    Alcohol use disorder, severe, dependence (HCC)    CHF (congestive heart failure) (HCC)    COPD without exacerbation (HCC)    GERD (gastroesophageal reflux disease)    Glaucoma    Degenerative disc disease, lumbar    Type 2 diabetes mellitus, with long-term current use of insulin (HCC)    Acute respiratory failure with hypoxemia    Sepsis with acute hypoxic respiratory failure without septic shock (HCC)    Primary lung adenocarcinoma (HCC)    Recurrent major depressive disorder, in remission (HCC)    Lactic acidemia    CKD (chronic kidney disease), stage II    Coronary artery disease involving native coronary artery of

## 2025-02-14 NOTE — ED NOTES
TRANSFER - OUT REPORT:    Verbal report given to NELSON Love on Travis Rogel  being transferred to [facility] for routine progression of patient care       Report consisted of patient's Situation, Background, Assessment and   Recommendations(SBAR).     Information from the following report(s) Nurse Handoff Report, ED SBAR, Recent Results, Med Rec Status, Cardiac Rhythm ST -> NSR , and Neuro Assessment was reviewed with the receiving nurse.    [place] Fall Assessment:    Presents to emergency department  because of falls (Syncope, seizure, or loss of consciousness): No  Age > 70: Yes  Altered Mental Status, Intoxication with alcohol or substance confusion (Disorientation, impaired judgment, poor safety awaremess, or inability to follow instructions): No  Impaired Mobility: Ambulates or transfers with assistive devices or assistance; Unable to ambulate or transer.: Yes  Nursing Judgement: Yes          Lines:   Peripheral IV 02/14/25 Left Antecubital (Active)        CRITICAL LABS:     Verbally repeated the following test results Lab/POC: lactic 2.36 -> 1.05  to NELSON Love    Orders for critical lab are (pin progress IV antibiotics    Repeat lab draw for critical (enter time)      Additional comments:n/a    Penny Fine RN     Opportunity for questions and clarification was provided.      Patient transported with:  Monitor and Registered Nurse

## 2025-02-14 NOTE — ED PROVIDER NOTES
0.005 % ophthalmic solution 1 drop (has no administration in time range)   pantoprazole (PROTONIX) tablet 40 mg (has no administration in time range)   pregabalin (LYRICA) capsule 150 mg (150 mg Oral Given 2/14/25 1455)   Vitamin D (CHOLECALCIFEROL) tablet 3,000 Units (3,000 Units Oral Not Given 2/14/25 1416)   ipratropium 0.5 mg-albuterol 2.5 mg (DUONEB) nebulizer solution 1 Dose (has no administration in time range)   glucose chewable tablet 16 g (has no administration in time range)   dextrose bolus 10% 125 mL (has no administration in time range)     Or   dextrose bolus 10% 250 mL (has no administration in time range)   glucagon injection 1 mg (has no administration in time range)   dextrose 10 % infusion (has no administration in time range)   insulin lispro (HUMALOG,ADMELOG) injection vial 0-4 Units (1 Units SubCUTAneous Given 2/14/25 1652)   ceFEPIme (MAXIPIME) 2,000 mg in sodium chloride 0.9 % 100 mL IVPB (mini-bag) (has no administration in time range)   azithromycin (ZITHROMAX) 500 mg in sodium chloride 0.9 % 250 mL IVPB ([id number]) (500 mg IntraVENous Not Given 2/14/25 1330)   guaiFENesin-dextromethorphan (ROBITUSSIN DM) 100-10 MG/5ML syrup 5 mL (has no administration in time range)   methylPREDNISolone sodium succ (SOLU-MEDROL) 40 mg in sterile water 1 mL injection (has no administration in time range)   arformoterol tartrate (BROVANA) nebulizer solution 15 mcg (has no administration in time range)   ipratropium 0.5 mg-albuterol 2.5 mg (DUONEB) nebulizer solution 1 Dose (1 Dose Inhalation Given 2/14/25 1546)   dextromethorphan-guaiFENesin (MUCINEX DM)  MG per extended release tablet 1 tablet (1 tablet Oral Not Given 2/14/25 1641)   insulin glargine (LANTUS) injection vial 15 Units (has no administration in time range)   insulin lispro (HUMALOG,ADMELOG) injection vial 5 Units (5 Units SubCUTAneous Given 2/14/25 1652)   budesonide (PULMICORT) nebulizer suspension 1 mg (has no administration in time

## 2025-02-15 ENCOUNTER — APPOINTMENT (OUTPATIENT)
Facility: HOSPITAL | Age: 80
DRG: 871 | End: 2025-02-15
Attending: HOSPITALIST
Payer: OTHER GOVERNMENT

## 2025-02-15 ENCOUNTER — APPOINTMENT (OUTPATIENT)
Facility: HOSPITAL | Age: 80
DRG: 871 | End: 2025-02-15
Payer: OTHER GOVERNMENT

## 2025-02-15 LAB
ALBUMIN SERPL-MCNC: 2.5 G/DL (ref 3.4–5)
ALBUMIN/GLOB SERPL: 0.5 (ref 0.8–1.7)
ALP SERPL-CCNC: 131 U/L (ref 45–117)
ALT SERPL-CCNC: 11 U/L (ref 16–61)
ANION GAP SERPL CALC-SCNC: 7 MMOL/L (ref 3–18)
AST SERPL-CCNC: 20 U/L (ref 10–38)
BASOPHILS # BLD: 0.05 K/UL (ref 0–0.1)
BASOPHILS NFR BLD: 0.5 % (ref 0–2)
BILIRUB SERPL-MCNC: 1 MG/DL (ref 0.2–1)
BUN SERPL-MCNC: 28 MG/DL (ref 7–18)
BUN/CREAT SERPL: 27 (ref 12–20)
CALCIUM SERPL-MCNC: 8.8 MG/DL (ref 8.5–10.1)
CHLORIDE SERPL-SCNC: 108 MMOL/L (ref 100–111)
CO2 SERPL-SCNC: 20 MMOL/L (ref 21–32)
CREAT SERPL-MCNC: 1.05 MG/DL (ref 0.6–1.3)
DIFFERENTIAL METHOD BLD: ABNORMAL
EOSINOPHIL # BLD: 0.35 K/UL (ref 0–0.4)
EOSINOPHIL NFR BLD: 3.8 % (ref 0–5)
ERYTHROCYTE [DISTWIDTH] IN BLOOD BY AUTOMATED COUNT: 14.4 % (ref 11.6–14.5)
EST. AVERAGE GLUCOSE BLD GHB EST-MCNC: 183 MG/DL
GLOBULIN SER CALC-MCNC: 5.4 G/DL (ref 2–4)
GLUCOSE BLD STRIP.AUTO-MCNC: 134 MG/DL (ref 70–110)
GLUCOSE BLD STRIP.AUTO-MCNC: 192 MG/DL (ref 70–110)
GLUCOSE BLD STRIP.AUTO-MCNC: 216 MG/DL (ref 70–110)
GLUCOSE BLD STRIP.AUTO-MCNC: 305 MG/DL (ref 70–110)
GLUCOSE BLD STRIP.AUTO-MCNC: 306 MG/DL (ref 70–110)
GLUCOSE SERPL-MCNC: 178 MG/DL (ref 74–99)
HBA1C MFR BLD: 8 % (ref 4.2–5.6)
HCT VFR BLD AUTO: 40.8 % (ref 36–48)
HGB BLD-MCNC: 12.7 G/DL (ref 13–16)
IMM GRANULOCYTES # BLD AUTO: 0.07 K/UL (ref 0–0.04)
IMM GRANULOCYTES NFR BLD AUTO: 0.8 % (ref 0–0.5)
LACTATE SERPL-SCNC: 2.1 MMOL/L (ref 0.4–2)
LYMPHOCYTES # BLD: 1.02 K/UL (ref 0.9–3.6)
LYMPHOCYTES NFR BLD: 11.2 % (ref 21–52)
MCH RBC QN AUTO: 30.9 PG (ref 24–34)
MCHC RBC AUTO-ENTMCNC: 31.1 G/DL (ref 31–37)
MCV RBC AUTO: 99.3 FL (ref 78–100)
MONOCYTES # BLD: 1.02 K/UL (ref 0.05–1.2)
MONOCYTES NFR BLD: 11.2 % (ref 3–10)
NEUTS SEG # BLD: 6.62 K/UL (ref 1.8–8)
NEUTS SEG NFR BLD: 72.5 % (ref 40–73)
NRBC # BLD: 0 K/UL (ref 0–0.01)
NRBC BLD-RTO: 0 PER 100 WBC
PLATELET # BLD AUTO: 343 K/UL (ref 135–420)
PMV BLD AUTO: 10 FL (ref 9.2–11.8)
POTASSIUM SERPL-SCNC: 4.1 MMOL/L (ref 3.5–5.5)
PROT SERPL-MCNC: 7.9 G/DL (ref 6.4–8.2)
RBC # BLD AUTO: 4.11 M/UL (ref 4.35–5.65)
SODIUM SERPL-SCNC: 135 MMOL/L (ref 136–145)
WBC # BLD AUTO: 9.1 K/UL (ref 4.6–13.2)

## 2025-02-15 PROCEDURE — 97165 OT EVAL LOW COMPLEX 30 MIN: CPT

## 2025-02-15 PROCEDURE — 6370000000 HC RX 637 (ALT 250 FOR IP)

## 2025-02-15 PROCEDURE — 97535 SELF CARE MNGMENT TRAINING: CPT

## 2025-02-15 PROCEDURE — 6360000002 HC RX W HCPCS: Performed by: STUDENT IN AN ORGANIZED HEALTH CARE EDUCATION/TRAINING PROGRAM

## 2025-02-15 PROCEDURE — 97530 THERAPEUTIC ACTIVITIES: CPT

## 2025-02-15 PROCEDURE — 36415 COLL VENOUS BLD VENIPUNCTURE: CPT

## 2025-02-15 PROCEDURE — 94640 AIRWAY INHALATION TREATMENT: CPT

## 2025-02-15 PROCEDURE — 2580000003 HC RX 258: Performed by: STUDENT IN AN ORGANIZED HEALTH CARE EDUCATION/TRAINING PROGRAM

## 2025-02-15 PROCEDURE — 80053 COMPREHEN METABOLIC PANEL: CPT

## 2025-02-15 PROCEDURE — 6370000000 HC RX 637 (ALT 250 FOR IP): Performed by: STUDENT IN AN ORGANIZED HEALTH CARE EDUCATION/TRAINING PROGRAM

## 2025-02-15 PROCEDURE — 2500000003 HC RX 250 WO HCPCS: Performed by: HOSPITALIST

## 2025-02-15 PROCEDURE — 2500000003 HC RX 250 WO HCPCS: Performed by: STUDENT IN AN ORGANIZED HEALTH CARE EDUCATION/TRAINING PROGRAM

## 2025-02-15 PROCEDURE — 85025 COMPLETE CBC W/AUTO DIFF WBC: CPT

## 2025-02-15 PROCEDURE — 2500000003 HC RX 250 WO HCPCS

## 2025-02-15 PROCEDURE — 83605 ASSAY OF LACTIC ACID: CPT

## 2025-02-15 PROCEDURE — 99232 SBSQ HOSP IP/OBS MODERATE 35: CPT | Performed by: HOSPITALIST

## 2025-02-15 PROCEDURE — 93306 TTE W/DOPPLER COMPLETE: CPT

## 2025-02-15 PROCEDURE — 6360000002 HC RX W HCPCS: Performed by: HOSPITALIST

## 2025-02-15 PROCEDURE — 6360000002 HC RX W HCPCS

## 2025-02-15 PROCEDURE — 2700000000 HC OXYGEN THERAPY PER DAY

## 2025-02-15 PROCEDURE — 71045 X-RAY EXAM CHEST 1 VIEW: CPT

## 2025-02-15 PROCEDURE — 2140000001 HC CVICU INTERMEDIATE R&B

## 2025-02-15 PROCEDURE — 94761 N-INVAS EAR/PLS OXIMETRY MLT: CPT

## 2025-02-15 PROCEDURE — 83036 HEMOGLOBIN GLYCOSYLATED A1C: CPT

## 2025-02-15 RX ORDER — FUROSEMIDE 10 MG/ML
40 INJECTION INTRAMUSCULAR; INTRAVENOUS ONCE
Status: COMPLETED | OUTPATIENT
Start: 2025-02-15 | End: 2025-02-15

## 2025-02-15 RX ADMIN — ARFORMOTEROL TARTRATE 15 MCG: 15 SOLUTION RESPIRATORY (INHALATION) at 20:47

## 2025-02-15 RX ADMIN — INSULIN LISPRO 1 UNITS: 100 INJECTION, SOLUTION INTRAVENOUS; SUBCUTANEOUS at 17:13

## 2025-02-15 RX ADMIN — CARVEDILOL 3.12 MG: 3.12 TABLET, FILM COATED ORAL at 08:32

## 2025-02-15 RX ADMIN — INSULIN LISPRO 5 UNITS: 100 INJECTION, SOLUTION INTRAVENOUS; SUBCUTANEOUS at 08:34

## 2025-02-15 RX ADMIN — BUDESONIDE 1 MG: 1 SUSPENSION RESPIRATORY (INHALATION) at 20:47

## 2025-02-15 RX ADMIN — CARVEDILOL 3.12 MG: 3.12 TABLET, FILM COATED ORAL at 20:35

## 2025-02-15 RX ADMIN — WATER 1000 MG: 1 INJECTION INTRAMUSCULAR; INTRAVENOUS; SUBCUTANEOUS at 10:49

## 2025-02-15 RX ADMIN — ATORVASTATIN CALCIUM 80 MG: 40 TABLET, FILM COATED ORAL at 20:35

## 2025-02-15 RX ADMIN — IPRATROPIUM BROMIDE AND ALBUTEROL SULFATE 1 DOSE: .5; 3 SOLUTION RESPIRATORY (INHALATION) at 11:19

## 2025-02-15 RX ADMIN — SODIUM CHLORIDE, PRESERVATIVE FREE 10 ML: 5 INJECTION INTRAVENOUS at 08:34

## 2025-02-15 RX ADMIN — LATANOPROST 1 DROP: 50 SOLUTION OPHTHALMIC at 21:18

## 2025-02-15 RX ADMIN — CEFEPIME 2000 MG: 2 INJECTION, POWDER, FOR SOLUTION INTRAVENOUS at 06:12

## 2025-02-15 RX ADMIN — WATER 40 MG: 1 INJECTION INTRAMUSCULAR; INTRAVENOUS; SUBCUTANEOUS at 08:33

## 2025-02-15 RX ADMIN — BUDESONIDE 1 MG: 1 SUSPENSION RESPIRATORY (INHALATION) at 07:46

## 2025-02-15 RX ADMIN — PREGABALIN 150 MG: 75 CAPSULE ORAL at 20:35

## 2025-02-15 RX ADMIN — ENOXAPARIN SODIUM 40 MG: 100 INJECTION SUBCUTANEOUS at 08:33

## 2025-02-15 RX ADMIN — CLOPIDOGREL BISULFATE 75 MG: 75 TABLET ORAL at 08:32

## 2025-02-15 RX ADMIN — IPRATROPIUM BROMIDE AND ALBUTEROL SULFATE 1 DOSE: .5; 3 SOLUTION RESPIRATORY (INHALATION) at 20:47

## 2025-02-15 RX ADMIN — IPRATROPIUM BROMIDE AND ALBUTEROL SULFATE 1 DOSE: .5; 3 SOLUTION RESPIRATORY (INHALATION) at 07:46

## 2025-02-15 RX ADMIN — INSULIN GLARGINE 15 UNITS: 100 INJECTION, SOLUTION SUBCUTANEOUS at 20:34

## 2025-02-15 RX ADMIN — VITAM B12 100 MCG: 100 TAB at 08:33

## 2025-02-15 RX ADMIN — INSULIN LISPRO 3 UNITS: 100 INJECTION, SOLUTION INTRAVENOUS; SUBCUTANEOUS at 20:35

## 2025-02-15 RX ADMIN — INSULIN LISPRO 5 UNITS: 100 INJECTION, SOLUTION INTRAVENOUS; SUBCUTANEOUS at 17:13

## 2025-02-15 RX ADMIN — INSULIN LISPRO 5 UNITS: 100 INJECTION, SOLUTION INTRAVENOUS; SUBCUTANEOUS at 12:39

## 2025-02-15 RX ADMIN — GUAIFENESIN, DEXTROMETHORPHAN HBR 1 TABLET: 600; 30 TABLET ORAL at 20:35

## 2025-02-15 RX ADMIN — ACETAMINOPHEN 650 MG: 325 TABLET ORAL at 21:18

## 2025-02-15 RX ADMIN — PANTOPRAZOLE SODIUM 40 MG: 40 TABLET, DELAYED RELEASE ORAL at 06:09

## 2025-02-15 RX ADMIN — Medication 3000 UNITS: at 08:32

## 2025-02-15 RX ADMIN — ACETAMINOPHEN 650 MG: 325 TABLET ORAL at 10:49

## 2025-02-15 RX ADMIN — GUAIFENESIN AND DEXTROMETHORPHAN 5 ML: 100; 10 SYRUP ORAL at 23:52

## 2025-02-15 RX ADMIN — SODIUM CHLORIDE, PRESERVATIVE FREE 10 ML: 5 INJECTION INTRAVENOUS at 21:19

## 2025-02-15 RX ADMIN — FLUOXETINE HYDROCHLORIDE 20 MG: 20 CAPSULE ORAL at 08:33

## 2025-02-15 RX ADMIN — PREGABALIN 150 MG: 75 CAPSULE ORAL at 08:33

## 2025-02-15 RX ADMIN — INSULIN LISPRO 1 UNITS: 100 INJECTION, SOLUTION INTRAVENOUS; SUBCUTANEOUS at 08:34

## 2025-02-15 RX ADMIN — ARFORMOTEROL TARTRATE 15 MCG: 15 SOLUTION RESPIRATORY (INHALATION) at 07:46

## 2025-02-15 RX ADMIN — GUAIFENESIN, DEXTROMETHORPHAN HBR 1 TABLET: 600; 30 TABLET ORAL at 08:33

## 2025-02-15 RX ADMIN — AZITHROMYCIN MONOHYDRATE 500 MG: 500 INJECTION, POWDER, LYOPHILIZED, FOR SOLUTION INTRAVENOUS at 12:41

## 2025-02-15 RX ADMIN — ASPIRIN 81 MG CHEWABLE TABLET 81 MG: 81 TABLET CHEWABLE at 08:33

## 2025-02-15 RX ADMIN — FUROSEMIDE 40 MG: 10 INJECTION, SOLUTION INTRAMUSCULAR; INTRAVENOUS at 10:50

## 2025-02-15 ASSESSMENT — PAIN SCALES - GENERAL
PAINLEVEL_OUTOF10: 0
PAINLEVEL_OUTOF10: 8
PAINLEVEL_OUTOF10: 0
PAINLEVEL_OUTOF10: 0
PAINLEVEL_OUTOF10: 3
PAINLEVEL_OUTOF10: 0
PAINLEVEL_OUTOF10: 0

## 2025-02-15 ASSESSMENT — PAIN DESCRIPTION - ORIENTATION: ORIENTATION: ANTERIOR

## 2025-02-15 ASSESSMENT — PAIN - FUNCTIONAL ASSESSMENT: PAIN_FUNCTIONAL_ASSESSMENT: ACTIVITIES ARE NOT PREVENTED

## 2025-02-15 ASSESSMENT — PAIN DESCRIPTION - LOCATION
LOCATION: HEAD
LOCATION: HEAD

## 2025-02-15 ASSESSMENT — PAIN DESCRIPTION - DESCRIPTORS
DESCRIPTORS: ACHING
DESCRIPTORS: ACHING

## 2025-02-15 NOTE — PLAN OF CARE
Problem: Occupational Therapy - Adult  Goal: By Discharge: Performs self-care activities at highest level of function for planned discharge setting.  See evaluation for individualized goals.  Description: Occupational Therapy Goals:  Initiated 2/15/2025 to be met within 7-10 days.    1.  Patient will perform grooming with supervision/set-up while standing at the sink for > 2 min with Good balance.   2.  Patient will perform bathing with supervision/set-up.  3.  Patient will perform lower body dressing with supervision/set-up for seated and standing aspects.  4.  Patient will perform toilet transfers with supervision/set-up.  5.  Patient will perform all aspects of toileting with supervision/set-up.  6.  Patient will participate in upper extremity therapeutic exercise/activities with supervision/set-up for 8 minutes to improve endurance and UB strength needed for ADLs    7.  Patient will utilize energy conservation techniques during functional activities with verbal cues.    PLOF: Pt lives with spouse who assists him with IADLs and provides supervision for ADLs and functional mobility. Pt uses rollator or electric scooter for functional mobility.  Outcome: Progressing  OCCUPATIONAL THERAPY EVALUATION    Patient: Travis Rogel (80 y.o. male)  Date: 2/15/2025  Primary Diagnosis: Hypoxia [R09.02]  Pneumonia due to infectious organism, unspecified laterality, unspecified part of lung [J18.9]       Precautions: Fall Risk    ASSESSMENT :  Pt is pleasant and cooperative, on 35 HFNC. O2 sats 92% at rest. Pt requires VCs for bed mobility for hands placement and lines management due to vision deficits (pt is blind in R eye and has minimal vision in L eye). Min A to pull up socks in prep for functional txfrs. Min A to std and for toileting hygiene with FWW for balance. Pt is slightly impulsive, benefits from VCs to focus on safety and to attend to task at hand. O2 sats after ~ 1 min of standing 88%. Educated pt on PLB

## 2025-02-15 NOTE — PLAN OF CARE
Problem: Chronic Conditions and Co-morbidities  Goal: Patient's chronic conditions and co-morbidity symptoms are monitored and maintained or improved  Outcome: Progressing  Flowsheets (Taken 2/14/2025 2142)  Care Plan - Patient's Chronic Conditions and Co-Morbidity Symptoms are Monitored and Maintained or Improved:   Monitor and assess patient's chronic conditions and comorbid symptoms for stability, deterioration, or improvement   Collaborate with multidisciplinary team to address chronic and comorbid conditions and prevent exacerbation or deterioration   Update acute care plan with appropriate goals if chronic or comorbid symptoms are exacerbated and prevent overall improvement and discharge     Problem: Discharge Planning  Goal: Discharge to home or other facility with appropriate resources  Outcome: Progressing  Flowsheets (Taken 2/14/2025 2142)  Discharge to home or other facility with appropriate resources: Identify barriers to discharge with patient and caregiver     Problem: Safety - Adult  Goal: Free from fall injury  Outcome: Progressing  Flowsheets (Taken 2/14/2025 2142)  Free From Fall Injury: Instruct family/caregiver on patient safety

## 2025-02-16 LAB
ANION GAP SERPL CALC-SCNC: 7 MMOL/L (ref 3–18)
BUN SERPL-MCNC: 29 MG/DL (ref 7–18)
BUN/CREAT SERPL: 27 (ref 12–20)
CALCIUM SERPL-MCNC: 9.2 MG/DL (ref 8.5–10.1)
CHLORIDE SERPL-SCNC: 107 MMOL/L (ref 100–111)
CO2 SERPL-SCNC: 23 MMOL/L (ref 21–32)
CREAT SERPL-MCNC: 1.06 MG/DL (ref 0.6–1.3)
ECHO BSA: 1.82 M2
ECHO EST RA PRESSURE: 3 MMHG
ECHO LA DIAMETER INDEX: 1.44 CM/M2
ECHO LA DIAMETER: 2.6 CM
ECHO LV E' LATERAL VELOCITY: 6.07 CM/S
ECHO LV EF PHYSICIAN: 55 %
ECHO LV FRACTIONAL SHORTENING: 45 % (ref 28–44)
ECHO LV INTERNAL DIMENSION DIASTOLE INDEX: 1.71 CM/M2
ECHO LV INTERNAL DIMENSION DIASTOLIC: 3.1 CM (ref 4.2–5.9)
ECHO LV INTERNAL DIMENSION SYSTOLIC INDEX: 0.94 CM/M2
ECHO LV INTERNAL DIMENSION SYSTOLIC: 1.7 CM
ECHO LV IVSD: 1.2 CM (ref 0.6–1)
ECHO LV MASS 2D: 106.8 G (ref 88–224)
ECHO LV MASS INDEX 2D: 59 G/M2 (ref 49–115)
ECHO LV POSTERIOR WALL DIASTOLIC: 1.1 CM (ref 0.6–1)
ECHO LV RELATIVE WALL THICKNESS RATIO: 0.71
ECHO MV A VELOCITY: 0.68 M/S
ECHO MV E DECELERATION TIME (DT): 125 MS
ECHO MV E VELOCITY: 0.55 M/S
ECHO MV E/A RATIO: 0.81
ECHO MV E/E' LATERAL: 9.06
ECHO PULMONARY ARTERY SYSTOLIC PRESSURE (PASP): 44 MMHG
ECHO RIGHT VENTRICULAR SYSTOLIC PRESSURE (RVSP): 44 MMHG
ECHO TV REGURGITANT MAX VELOCITY: 3.22 M/S
ECHO TV REGURGITANT PEAK GRADIENT: 41 MMHG
ERYTHROCYTE [DISTWIDTH] IN BLOOD BY AUTOMATED COUNT: 13.9 % (ref 11.6–14.5)
GLUCOSE BLD STRIP.AUTO-MCNC: 174 MG/DL (ref 70–110)
GLUCOSE BLD STRIP.AUTO-MCNC: 205 MG/DL (ref 70–110)
GLUCOSE BLD STRIP.AUTO-MCNC: 224 MG/DL (ref 70–110)
GLUCOSE BLD STRIP.AUTO-MCNC: 271 MG/DL (ref 70–110)
GLUCOSE SERPL-MCNC: 258 MG/DL (ref 74–99)
HCT VFR BLD AUTO: 38.3 % (ref 36–48)
HGB BLD-MCNC: 12.2 G/DL (ref 13–16)
MCH RBC QN AUTO: 31.4 PG (ref 24–34)
MCHC RBC AUTO-ENTMCNC: 31.9 G/DL (ref 31–37)
MCV RBC AUTO: 98.7 FL (ref 78–100)
NRBC # BLD: 0 K/UL (ref 0–0.01)
NRBC BLD-RTO: 0 PER 100 WBC
PLATELET # BLD AUTO: 358 K/UL (ref 135–420)
PMV BLD AUTO: 9.9 FL (ref 9.2–11.8)
POTASSIUM SERPL-SCNC: 4.6 MMOL/L (ref 3.5–5.5)
RBC # BLD AUTO: 3.88 M/UL (ref 4.35–5.65)
SODIUM SERPL-SCNC: 137 MMOL/L (ref 136–145)
WBC # BLD AUTO: 9.2 K/UL (ref 4.6–13.2)

## 2025-02-16 PROCEDURE — 6360000002 HC RX W HCPCS

## 2025-02-16 PROCEDURE — 6370000000 HC RX 637 (ALT 250 FOR IP)

## 2025-02-16 PROCEDURE — 97116 GAIT TRAINING THERAPY: CPT

## 2025-02-16 PROCEDURE — 2500000003 HC RX 250 WO HCPCS

## 2025-02-16 PROCEDURE — 2500000003 HC RX 250 WO HCPCS: Performed by: STUDENT IN AN ORGANIZED HEALTH CARE EDUCATION/TRAINING PROGRAM

## 2025-02-16 PROCEDURE — 6370000000 HC RX 637 (ALT 250 FOR IP): Performed by: STUDENT IN AN ORGANIZED HEALTH CARE EDUCATION/TRAINING PROGRAM

## 2025-02-16 PROCEDURE — 36415 COLL VENOUS BLD VENIPUNCTURE: CPT

## 2025-02-16 PROCEDURE — 6370000000 HC RX 637 (ALT 250 FOR IP): Performed by: HOSPITALIST

## 2025-02-16 PROCEDURE — 6360000002 HC RX W HCPCS: Performed by: STUDENT IN AN ORGANIZED HEALTH CARE EDUCATION/TRAINING PROGRAM

## 2025-02-16 PROCEDURE — 99232 SBSQ HOSP IP/OBS MODERATE 35: CPT | Performed by: HOSPITALIST

## 2025-02-16 PROCEDURE — 94640 AIRWAY INHALATION TREATMENT: CPT

## 2025-02-16 PROCEDURE — 85027 COMPLETE CBC AUTOMATED: CPT

## 2025-02-16 PROCEDURE — 97161 PT EVAL LOW COMPLEX 20 MIN: CPT

## 2025-02-16 PROCEDURE — 2140000001 HC CVICU INTERMEDIATE R&B

## 2025-02-16 PROCEDURE — 82962 GLUCOSE BLOOD TEST: CPT

## 2025-02-16 PROCEDURE — 94761 N-INVAS EAR/PLS OXIMETRY MLT: CPT

## 2025-02-16 PROCEDURE — 2500000003 HC RX 250 WO HCPCS: Performed by: HOSPITALIST

## 2025-02-16 PROCEDURE — 6360000002 HC RX W HCPCS: Performed by: HOSPITALIST

## 2025-02-16 PROCEDURE — 80048 BASIC METABOLIC PNL TOTAL CA: CPT

## 2025-02-16 PROCEDURE — 2580000003 HC RX 258: Performed by: STUDENT IN AN ORGANIZED HEALTH CARE EDUCATION/TRAINING PROGRAM

## 2025-02-16 PROCEDURE — 2700000000 HC OXYGEN THERAPY PER DAY

## 2025-02-16 RX ORDER — IPRATROPIUM BROMIDE AND ALBUTEROL SULFATE 2.5; .5 MG/3ML; MG/3ML
1 SOLUTION RESPIRATORY (INHALATION)
Status: DISCONTINUED | OUTPATIENT
Start: 2025-02-16 | End: 2025-02-18 | Stop reason: HOSPADM

## 2025-02-16 RX ORDER — CARBOXYMETHYLCELLULOSE SODIUM 10 MG/ML
1 GEL OPHTHALMIC 3 TIMES DAILY
Status: DISCONTINUED | OUTPATIENT
Start: 2025-02-16 | End: 2025-02-18 | Stop reason: HOSPADM

## 2025-02-16 RX ADMIN — GUAIFENESIN, DEXTROMETHORPHAN HBR 1 TABLET: 600; 30 TABLET ORAL at 09:06

## 2025-02-16 RX ADMIN — VITAM B12 100 MCG: 100 TAB at 09:06

## 2025-02-16 RX ADMIN — ARFORMOTEROL TARTRATE 15 MCG: 15 SOLUTION RESPIRATORY (INHALATION) at 20:37

## 2025-02-16 RX ADMIN — LATANOPROST 1 DROP: 50 SOLUTION OPHTHALMIC at 21:13

## 2025-02-16 RX ADMIN — WATER 40 MG: 1 INJECTION INTRAMUSCULAR; INTRAVENOUS; SUBCUTANEOUS at 09:05

## 2025-02-16 RX ADMIN — GUAIFENESIN AND DEXTROMETHORPHAN 5 ML: 100; 10 SYRUP ORAL at 11:37

## 2025-02-16 RX ADMIN — GUAIFENESIN, DEXTROMETHORPHAN HBR 1 TABLET: 600; 30 TABLET ORAL at 21:12

## 2025-02-16 RX ADMIN — PANTOPRAZOLE SODIUM 40 MG: 40 TABLET, DELAYED RELEASE ORAL at 06:03

## 2025-02-16 RX ADMIN — BUDESONIDE 1 MG: 1 SUSPENSION RESPIRATORY (INHALATION) at 08:24

## 2025-02-16 RX ADMIN — FLUOXETINE HYDROCHLORIDE 20 MG: 20 CAPSULE ORAL at 09:06

## 2025-02-16 RX ADMIN — CARVEDILOL 3.12 MG: 3.12 TABLET, FILM COATED ORAL at 09:06

## 2025-02-16 RX ADMIN — SODIUM CHLORIDE, PRESERVATIVE FREE 10 ML: 5 INJECTION INTRAVENOUS at 21:14

## 2025-02-16 RX ADMIN — CARVEDILOL 3.12 MG: 3.12 TABLET, FILM COATED ORAL at 21:12

## 2025-02-16 RX ADMIN — PREGABALIN 150 MG: 75 CAPSULE ORAL at 21:13

## 2025-02-16 RX ADMIN — IPRATROPIUM BROMIDE AND ALBUTEROL SULFATE 1 DOSE: .5; 3 SOLUTION RESPIRATORY (INHALATION) at 20:37

## 2025-02-16 RX ADMIN — INSULIN LISPRO 1 UNITS: 100 INJECTION, SOLUTION INTRAVENOUS; SUBCUTANEOUS at 21:09

## 2025-02-16 RX ADMIN — INSULIN LISPRO 2 UNITS: 100 INJECTION, SOLUTION INTRAVENOUS; SUBCUTANEOUS at 16:31

## 2025-02-16 RX ADMIN — INSULIN LISPRO 5 UNITS: 100 INJECTION, SOLUTION INTRAVENOUS; SUBCUTANEOUS at 16:30

## 2025-02-16 RX ADMIN — WATER 1000 MG: 1 INJECTION INTRAMUSCULAR; INTRAVENOUS; SUBCUTANEOUS at 11:37

## 2025-02-16 RX ADMIN — PREGABALIN 150 MG: 75 CAPSULE ORAL at 09:05

## 2025-02-16 RX ADMIN — CLOPIDOGREL BISULFATE 75 MG: 75 TABLET ORAL at 09:06

## 2025-02-16 RX ADMIN — GUAIFENESIN AND DEXTROMETHORPHAN 5 ML: 100; 10 SYRUP ORAL at 21:12

## 2025-02-16 RX ADMIN — AZITHROMYCIN MONOHYDRATE 500 MG: 500 INJECTION, POWDER, LYOPHILIZED, FOR SOLUTION INTRAVENOUS at 13:27

## 2025-02-16 RX ADMIN — IPRATROPIUM BROMIDE AND ALBUTEROL SULFATE 1 DOSE: .5; 3 SOLUTION RESPIRATORY (INHALATION) at 08:23

## 2025-02-16 RX ADMIN — IPRATROPIUM BROMIDE AND ALBUTEROL SULFATE 1 DOSE: .5; 3 SOLUTION RESPIRATORY (INHALATION) at 12:46

## 2025-02-16 RX ADMIN — ASPIRIN 81 MG CHEWABLE TABLET 81 MG: 81 TABLET CHEWABLE at 09:06

## 2025-02-16 RX ADMIN — GUAIFENESIN AND DEXTROMETHORPHAN 5 ML: 100; 10 SYRUP ORAL at 04:00

## 2025-02-16 RX ADMIN — INSULIN LISPRO 5 UNITS: 100 INJECTION, SOLUTION INTRAVENOUS; SUBCUTANEOUS at 11:38

## 2025-02-16 RX ADMIN — ACETAMINOPHEN 650 MG: 325 TABLET ORAL at 21:12

## 2025-02-16 RX ADMIN — SODIUM CHLORIDE, PRESERVATIVE FREE 10 ML: 5 INJECTION INTRAVENOUS at 09:06

## 2025-02-16 RX ADMIN — ACETAMINOPHEN 650 MG: 325 TABLET ORAL at 03:54

## 2025-02-16 RX ADMIN — ACETAMINOPHEN 650 MG: 325 TABLET ORAL at 11:42

## 2025-02-16 RX ADMIN — INSULIN LISPRO 1 UNITS: 100 INJECTION, SOLUTION INTRAVENOUS; SUBCUTANEOUS at 11:38

## 2025-02-16 RX ADMIN — BUDESONIDE 1 MG: 1 SUSPENSION RESPIRATORY (INHALATION) at 20:37

## 2025-02-16 RX ADMIN — ARFORMOTEROL TARTRATE 15 MCG: 15 SOLUTION RESPIRATORY (INHALATION) at 08:24

## 2025-02-16 RX ADMIN — ATORVASTATIN CALCIUM 80 MG: 40 TABLET, FILM COATED ORAL at 21:11

## 2025-02-16 RX ADMIN — CARBOXYMETHYLCELLULOSE SODIUM 1 DROP: 10 GEL OPHTHALMIC at 21:13

## 2025-02-16 RX ADMIN — INSULIN GLARGINE 15 UNITS: 100 INJECTION, SOLUTION SUBCUTANEOUS at 21:10

## 2025-02-16 RX ADMIN — Medication 3000 UNITS: at 09:06

## 2025-02-16 RX ADMIN — INSULIN LISPRO 5 UNITS: 100 INJECTION, SOLUTION INTRAVENOUS; SUBCUTANEOUS at 09:08

## 2025-02-16 RX ADMIN — ENOXAPARIN SODIUM 40 MG: 100 INJECTION SUBCUTANEOUS at 09:05

## 2025-02-16 ASSESSMENT — PAIN DESCRIPTION - ORIENTATION
ORIENTATION: RIGHT
ORIENTATION: ANTERIOR
ORIENTATION: RIGHT
ORIENTATION: ANTERIOR

## 2025-02-16 ASSESSMENT — PAIN DESCRIPTION - PAIN TYPE
TYPE: CHRONIC PAIN
TYPE: CHRONIC PAIN

## 2025-02-16 ASSESSMENT — PAIN - FUNCTIONAL ASSESSMENT
PAIN_FUNCTIONAL_ASSESSMENT: ACTIVITIES ARE NOT PREVENTED

## 2025-02-16 ASSESSMENT — PAIN SCALES - GENERAL
PAINLEVEL_OUTOF10: 0
PAINLEVEL_OUTOF10: 3
PAINLEVEL_OUTOF10: 6
PAINLEVEL_OUTOF10: 3
PAINLEVEL_OUTOF10: 3

## 2025-02-16 ASSESSMENT — PAIN DESCRIPTION - DESCRIPTORS
DESCRIPTORS: ACHING

## 2025-02-16 ASSESSMENT — PAIN DESCRIPTION - FREQUENCY
FREQUENCY: INTERMITTENT
FREQUENCY: INTERMITTENT

## 2025-02-16 ASSESSMENT — PAIN DESCRIPTION - ONSET
ONSET: ON-GOING
ONSET: GRADUAL

## 2025-02-16 ASSESSMENT — PAIN DESCRIPTION - LOCATION
LOCATION: HEAD;EYE
LOCATION: HEAD
LOCATION: HEAD
LOCATION: HEAD;EYE

## 2025-02-16 NOTE — PLAN OF CARE
Problem: Chronic Conditions and Co-morbidities  Goal: Patient's chronic conditions and co-morbidity symptoms are monitored and maintained or improved  Outcome: Progressing  Flowsheets (Taken 2/15/2025 2013)  Care Plan - Patient's Chronic Conditions and Co-Morbidity Symptoms are Monitored and Maintained or Improved:   Monitor and assess patient's chronic conditions and comorbid symptoms for stability, deterioration, or improvement   Collaborate with multidisciplinary team to address chronic and comorbid conditions and prevent exacerbation or deterioration   Update acute care plan with appropriate goals if chronic or comorbid symptoms are exacerbated and prevent overall improvement and discharge     Problem: Discharge Planning  Goal: Discharge to home or other facility with appropriate resources  Outcome: Progressing  Flowsheets (Taken 2/15/2025 2013)  Discharge to home or other facility with appropriate resources: Identify barriers to discharge with patient and caregiver     Problem: Safety - Adult  Goal: Free from fall injury  2/16/2025 0003 by Sonido Geronimo, RN  Outcome: Progressing  2/15/2025 1315 by Adam Kilpatrick, RN  Outcome: Progressing  Flowsheets (Taken 2/15/2025 1315)  Free From Fall Injury:   Instruct family/caregiver on patient safety   Based on caregiver fall risk screen, instruct family/caregiver to ask for assistance with transferring infant if caregiver noted to have fall risk factors     Problem: Pain  Goal: Verbalizes/displays adequate comfort level or baseline comfort level  Outcome: Progressing  Flowsheets (Taken 2/15/2025 2013)  Verbalizes/displays adequate comfort level or baseline comfort level:   Encourage patient to monitor pain and request assistance   Assess pain using appropriate pain scale   Administer analgesics based on type and severity of pain and evaluate response   Implement non-pharmacological measures as appropriate and evaluate response   Consider cultural and social

## 2025-02-16 NOTE — CARE COORDINATION
[facility] [phone].        JOSE MARTIN PradoN RN  Care Management

## 2025-02-16 NOTE — PLAN OF CARE
Problem: Physical Therapy - Adult  Goal: By Discharge: Performs mobility at highest level of function for planned discharge setting.  See evaluation for individualized goals.  Description: Physical Therapy Goals:  Initiated 2/16/2025 to be met within 7-10 days.    1.  Patient will move from supine to sit and sit to supine  in bed with modified independence.    2.  Patient will transfer from bed to chair and chair to bed with modified independence using the least restrictive device.  3.  Patient will perform sit to stand with modified independence.  4.  Patient will ambulate with modified independence for 50 feet with the least restrictive device.     PLOF: low vision, lives with wife who can assist as needed, level entry apartment, 1 story, uses rollator or scooter outside the home, no equipment in home     Outcome: Progressing   PHYSICAL THERAPY EVALUATION    Patient: Travis Rogel (80 y.o. male)  Date: 2/16/2025  Primary Diagnosis: Hypoxia [R09.02]  Pneumonia due to infectious organism, unspecified laterality, unspecified part of lung [J18.9]       Precautions: Fall Risk,     ASSESSMENT :  Pt cleared by nursing prior to eval. RN reported pt having decreased vision: blind in right eye and 30% vision on left eye. Pt was semi reclined in bed, in NAD and agreeable to eval. Pt transferred to EOB with supervision. Pt transferred to standing at RW with CGA. Pt ambulated ~10 ft with stand by assist and RW but when walking an additional ~10 ft back to bed, pt attempted to abandon walker. Verbal cues provided to encourage walker use due to unsteadiness and pt ambulated with stand by back to EOB. Pt able to remain standing at RW for ~ 1 min while therapist changed linens. Pt transferred back to bed with stand by assistance. Pt began experiencing a coughing fit requiring suction which helped. O2 was measured at 94% and HR was 90 bpm following activity. Pt to benefit from skilled PT during admission to maximize pt safe

## 2025-02-16 NOTE — CARE COORDINATION
02/16/25 0923   Service Assessment   Patient Orientation Alert and Oriented   Cognition Alert   History Provided By Patient   Primary Caregiver Self   Support Systems Spouse/Significant Other;Children;Family Members   Patient's Healthcare Decision Maker is: Legal Next of Kin   PCP Verified by CM No  (per pt, [facility] and he [facility] about 6 months ago)   Prior Functional Level Independent in ADLs/IADLs   Current Functional Level Independent in ADLs/IADLs   Can patient return to prior living arrangement Yes   Ability to make needs known: Good   Family able to assist with home care needs: Yes   Would you like for me to discuss the discharge plan with any other family members/significant others, and if so, who? Yes  (his wife Alondra Braden)   Financial Resources [place] ([facility])   Community Resources None   Social/Functional History   Lives With Spouse   Type of Home Apartment   Home Layout One level   Home Access Level entry   Bathroom Shower/Tub Tub/Shower unit   Bathroom Toilet Standard   Bathroom Equipment Shower chair   Home Equipment Electric scooter;Rollator  (nebulizer)   Receives Help From Family   Prior Level of Assist for ADLs Independent   Prior Level of Assist for Homemaking Independent   Ambulation Assistance Independent   Prior Level of Assist for Transfers Independent   Active  No   Patient's  Info wife   Mode of Transportation Car   Occupation Retired   Discharge Planning   Type of Residence Apartment   Living Arrangements Spouse/Significant Other   Current Services Prior To Admission None   Potential Assistance Needed Home Care   DME Ordered? No   Potential Assistance Purchasing Medications No   Type of Home Care Services None   Patient expects to be discharged to: Apartment   Services At/After Discharge   Transition of Care Consult (CM Consult) Other  ([facility]   Services At/After Discharge Home Health   [place] Resource Information Provided? Yes   Mode of Transport

## 2025-02-16 NOTE — PLAN OF CARE
Problem: Chronic Conditions and Co-morbidities  Goal: Patient's chronic conditions and co-morbidity symptoms are monitored and maintained or improved  2/16/2025 1039 by Kate Lucia RN  Outcome: Progressing  Flowsheets (Taken 2/16/2025 0900)  Care Plan - Patient's Chronic Conditions and Co-Morbidity Symptoms are Monitored and Maintained or Improved:   Monitor and assess patient's chronic conditions and comorbid symptoms for stability, deterioration, or improvement   Collaborate with multidisciplinary team to address chronic and comorbid conditions and prevent exacerbation or deterioration   Update acute care plan with appropriate goals if chronic or comorbid symptoms are exacerbated and prevent overall improvement and discharge  2/16/2025 0003 by Sonido Geronimo RN  Outcome: Progressing  Flowsheets (Taken 2/15/2025 2013)  Care Plan - Patient's Chronic Conditions and Co-Morbidity Symptoms are Monitored and Maintained or Improved:   Monitor and assess patient's chronic conditions and comorbid symptoms for stability, deterioration, or improvement   Collaborate with multidisciplinary team to address chronic and comorbid conditions and prevent exacerbation or deterioration   Update acute care plan with appropriate goals if chronic or comorbid symptoms are exacerbated and prevent overall improvement and discharge     Problem: Discharge Planning  Goal: Discharge to home or other facility with appropriate resources  2/16/2025 1039 by Kate Lucia, RN  Outcome: Progressing  Flowsheets (Taken 2/16/2025 0900)  Discharge to home or other facility with appropriate resources: Identify barriers to discharge with patient and caregiver  2/16/2025 0003 by Sonido Geronimo, RN  Outcome: Progressing  Flowsheets (Taken 2/15/2025 2013)  Discharge to home or other facility with appropriate resources: Identify barriers to discharge with patient and caregiver     Problem: Safety - Adult  Goal: Free from fall injury  2/16/2025 1039

## 2025-02-17 LAB
ANION GAP SERPL CALC-SCNC: 6 MMOL/L (ref 3–18)
BUN SERPL-MCNC: 22 MG/DL (ref 7–18)
BUN/CREAT SERPL: 28 (ref 12–20)
CALCIUM SERPL-MCNC: 8.9 MG/DL (ref 8.5–10.1)
CHLORIDE SERPL-SCNC: 109 MMOL/L (ref 100–111)
CO2 SERPL-SCNC: 23 MMOL/L (ref 21–32)
CREAT SERPL-MCNC: 0.79 MG/DL (ref 0.6–1.3)
ERYTHROCYTE [DISTWIDTH] IN BLOOD BY AUTOMATED COUNT: 14 % (ref 11.6–14.5)
GLUCOSE BLD STRIP.AUTO-MCNC: 115 MG/DL (ref 70–110)
GLUCOSE BLD STRIP.AUTO-MCNC: 154 MG/DL (ref 70–110)
GLUCOSE BLD STRIP.AUTO-MCNC: 236 MG/DL (ref 70–110)
GLUCOSE BLD STRIP.AUTO-MCNC: 276 MG/DL (ref 70–110)
GLUCOSE SERPL-MCNC: 109 MG/DL (ref 74–99)
HCT VFR BLD AUTO: 37.4 % (ref 36–48)
HGB BLD-MCNC: 12.1 G/DL (ref 13–16)
MCH RBC QN AUTO: 31.7 PG (ref 24–34)
MCHC RBC AUTO-ENTMCNC: 32.4 G/DL (ref 31–37)
MCV RBC AUTO: 97.9 FL (ref 78–100)
NRBC # BLD: 0 K/UL (ref 0–0.01)
NRBC BLD-RTO: 0 PER 100 WBC
PLATELET # BLD AUTO: 379 K/UL (ref 135–420)
PMV BLD AUTO: 9.8 FL (ref 9.2–11.8)
POTASSIUM SERPL-SCNC: 3.9 MMOL/L (ref 3.5–5.5)
RBC # BLD AUTO: 3.82 M/UL (ref 4.35–5.65)
SODIUM SERPL-SCNC: 138 MMOL/L (ref 136–145)
WBC # BLD AUTO: 10.9 K/UL (ref 4.6–13.2)

## 2025-02-17 PROCEDURE — 2140000001 HC CVICU INTERMEDIATE R&B

## 2025-02-17 PROCEDURE — 6370000000 HC RX 637 (ALT 250 FOR IP): Performed by: HOSPITALIST

## 2025-02-17 PROCEDURE — 6360000002 HC RX W HCPCS: Performed by: STUDENT IN AN ORGANIZED HEALTH CARE EDUCATION/TRAINING PROGRAM

## 2025-02-17 PROCEDURE — 2580000003 HC RX 258: Performed by: STUDENT IN AN ORGANIZED HEALTH CARE EDUCATION/TRAINING PROGRAM

## 2025-02-17 PROCEDURE — 2500000003 HC RX 250 WO HCPCS: Performed by: STUDENT IN AN ORGANIZED HEALTH CARE EDUCATION/TRAINING PROGRAM

## 2025-02-17 PROCEDURE — 6370000000 HC RX 637 (ALT 250 FOR IP): Performed by: STUDENT IN AN ORGANIZED HEALTH CARE EDUCATION/TRAINING PROGRAM

## 2025-02-17 PROCEDURE — 80048 BASIC METABOLIC PNL TOTAL CA: CPT

## 2025-02-17 PROCEDURE — 6360000002 HC RX W HCPCS

## 2025-02-17 PROCEDURE — 94640 AIRWAY INHALATION TREATMENT: CPT

## 2025-02-17 PROCEDURE — 6370000000 HC RX 637 (ALT 250 FOR IP)

## 2025-02-17 PROCEDURE — 99232 SBSQ HOSP IP/OBS MODERATE 35: CPT | Performed by: HOSPITALIST

## 2025-02-17 PROCEDURE — 6360000002 HC RX W HCPCS: Performed by: HOSPITALIST

## 2025-02-17 PROCEDURE — 85027 COMPLETE CBC AUTOMATED: CPT

## 2025-02-17 PROCEDURE — 2500000003 HC RX 250 WO HCPCS

## 2025-02-17 PROCEDURE — 2700000000 HC OXYGEN THERAPY PER DAY

## 2025-02-17 PROCEDURE — 36415 COLL VENOUS BLD VENIPUNCTURE: CPT

## 2025-02-17 PROCEDURE — 82962 GLUCOSE BLOOD TEST: CPT

## 2025-02-17 PROCEDURE — 94761 N-INVAS EAR/PLS OXIMETRY MLT: CPT

## 2025-02-17 PROCEDURE — 2500000003 HC RX 250 WO HCPCS: Performed by: HOSPITALIST

## 2025-02-17 RX ORDER — ARFORMOTEROL TARTRATE 15 UG/2ML
15 SOLUTION RESPIRATORY (INHALATION)
Qty: 120 ML | Refills: 3 | Status: SHIPPED | OUTPATIENT
Start: 2025-02-17 | End: 2025-02-18

## 2025-02-17 RX ORDER — HYDROCODONE POLISTIREX AND CHLORPHENIRAMINE POLISTIREX 10; 8 MG/5ML; MG/5ML
5 SUSPENSION, EXTENDED RELEASE ORAL EVERY 12 HOURS PRN
Status: DISCONTINUED | OUTPATIENT
Start: 2025-02-17 | End: 2025-02-18 | Stop reason: HOSPADM

## 2025-02-17 RX ORDER — FUROSEMIDE 40 MG/1
40 TABLET ORAL DAILY
Qty: 30 TABLET | Refills: 0 | Status: SHIPPED | OUTPATIENT
Start: 2025-02-17 | End: 2025-02-18

## 2025-02-17 RX ORDER — PREDNISONE 20 MG/1
20 TABLET ORAL DAILY
Qty: 5 TABLET | Refills: 0 | Status: SHIPPED | OUTPATIENT
Start: 2025-02-17 | End: 2025-02-18

## 2025-02-17 RX ORDER — AZITHROMYCIN 500 MG/1
500 TABLET, FILM COATED ORAL DAILY
Qty: 4 TABLET | Refills: 0 | Status: SHIPPED | OUTPATIENT
Start: 2025-02-17 | End: 2025-02-18

## 2025-02-17 RX ORDER — GUAIFENESIN/DEXTROMETHORPHAN 100-10MG/5
5 SYRUP ORAL EVERY 4 HOURS PRN
Qty: 120 ML | Refills: 0 | Status: SHIPPED | OUTPATIENT
Start: 2025-02-17 | End: 2025-02-18

## 2025-02-17 RX ADMIN — ARFORMOTEROL TARTRATE 15 MCG: 15 SOLUTION RESPIRATORY (INHALATION) at 19:51

## 2025-02-17 RX ADMIN — SODIUM CHLORIDE, PRESERVATIVE FREE 10 ML: 5 INJECTION INTRAVENOUS at 23:45

## 2025-02-17 RX ADMIN — GUAIFENESIN AND DEXTROMETHORPHAN 5 ML: 100; 10 SYRUP ORAL at 23:46

## 2025-02-17 RX ADMIN — PREGABALIN 150 MG: 75 CAPSULE ORAL at 20:48

## 2025-02-17 RX ADMIN — ACETAMINOPHEN 650 MG: 325 TABLET ORAL at 11:21

## 2025-02-17 RX ADMIN — LATANOPROST 1 DROP: 50 SOLUTION OPHTHALMIC at 23:54

## 2025-02-17 RX ADMIN — VITAM B12 100 MCG: 100 TAB at 09:19

## 2025-02-17 RX ADMIN — ATORVASTATIN CALCIUM 80 MG: 40 TABLET, FILM COATED ORAL at 20:49

## 2025-02-17 RX ADMIN — Medication 3000 UNITS: at 09:19

## 2025-02-17 RX ADMIN — AZITHROMYCIN MONOHYDRATE 500 MG: 500 INJECTION, POWDER, LYOPHILIZED, FOR SOLUTION INTRAVENOUS at 14:00

## 2025-02-17 RX ADMIN — PANTOPRAZOLE SODIUM 40 MG: 40 TABLET, DELAYED RELEASE ORAL at 05:35

## 2025-02-17 RX ADMIN — INSULIN LISPRO 2 UNITS: 100 INJECTION, SOLUTION INTRAVENOUS; SUBCUTANEOUS at 11:18

## 2025-02-17 RX ADMIN — ASPIRIN 81 MG CHEWABLE TABLET 81 MG: 81 TABLET CHEWABLE at 09:19

## 2025-02-17 RX ADMIN — FLUOXETINE HYDROCHLORIDE 20 MG: 20 CAPSULE ORAL at 09:19

## 2025-02-17 RX ADMIN — IPRATROPIUM BROMIDE AND ALBUTEROL SULFATE 1 DOSE: .5; 3 SOLUTION RESPIRATORY (INHALATION) at 07:10

## 2025-02-17 RX ADMIN — GUAIFENESIN, DEXTROMETHORPHAN HBR 1 TABLET: 600; 30 TABLET ORAL at 09:19

## 2025-02-17 RX ADMIN — INSULIN LISPRO 1 UNITS: 100 INJECTION, SOLUTION INTRAVENOUS; SUBCUTANEOUS at 16:40

## 2025-02-17 RX ADMIN — ACETAMINOPHEN 650 MG: 325 TABLET ORAL at 23:53

## 2025-02-17 RX ADMIN — INSULIN GLARGINE 15 UNITS: 100 INJECTION, SOLUTION SUBCUTANEOUS at 20:51

## 2025-02-17 RX ADMIN — INSULIN LISPRO 5 UNITS: 100 INJECTION, SOLUTION INTRAVENOUS; SUBCUTANEOUS at 16:40

## 2025-02-17 RX ADMIN — IPRATROPIUM BROMIDE AND ALBUTEROL SULFATE 1 DOSE: .5; 3 SOLUTION RESPIRATORY (INHALATION) at 14:24

## 2025-02-17 RX ADMIN — CARVEDILOL 3.12 MG: 3.12 TABLET, FILM COATED ORAL at 09:20

## 2025-02-17 RX ADMIN — PREGABALIN 150 MG: 75 CAPSULE ORAL at 09:19

## 2025-02-17 RX ADMIN — ACETAMINOPHEN 650 MG: 325 TABLET ORAL at 05:35

## 2025-02-17 RX ADMIN — CLOPIDOGREL BISULFATE 75 MG: 75 TABLET ORAL at 09:19

## 2025-02-17 RX ADMIN — HYDROCODONE POLISTIREX AND CHLORPHENIRAMINE POLISTIREX 5 ML: 10; 8 SUSPENSION, EXTENDED RELEASE ORAL at 16:39

## 2025-02-17 RX ADMIN — BUDESONIDE 1 MG: 1 SUSPENSION RESPIRATORY (INHALATION) at 07:10

## 2025-02-17 RX ADMIN — WATER 40 MG: 1 INJECTION INTRAMUSCULAR; INTRAVENOUS; SUBCUTANEOUS at 09:18

## 2025-02-17 RX ADMIN — ENOXAPARIN SODIUM 40 MG: 100 INJECTION SUBCUTANEOUS at 09:19

## 2025-02-17 RX ADMIN — GUAIFENESIN AND DEXTROMETHORPHAN 5 ML: 100; 10 SYRUP ORAL at 05:35

## 2025-02-17 RX ADMIN — BUDESONIDE 1 MG: 1 SUSPENSION RESPIRATORY (INHALATION) at 19:51

## 2025-02-17 RX ADMIN — CARBOXYMETHYLCELLULOSE SODIUM 1 DROP: 10 GEL OPHTHALMIC at 15:08

## 2025-02-17 RX ADMIN — ARFORMOTEROL TARTRATE 15 MCG: 15 SOLUTION RESPIRATORY (INHALATION) at 07:10

## 2025-02-17 RX ADMIN — CARVEDILOL 3.12 MG: 3.12 TABLET, FILM COATED ORAL at 20:50

## 2025-02-17 RX ADMIN — INSULIN LISPRO 5 UNITS: 100 INJECTION, SOLUTION INTRAVENOUS; SUBCUTANEOUS at 11:18

## 2025-02-17 RX ADMIN — WATER 1000 MG: 1 INJECTION INTRAMUSCULAR; INTRAVENOUS; SUBCUTANEOUS at 11:20

## 2025-02-17 RX ADMIN — IPRATROPIUM BROMIDE AND ALBUTEROL SULFATE 1 DOSE: .5; 3 SOLUTION RESPIRATORY (INHALATION) at 19:51

## 2025-02-17 RX ADMIN — CARBOXYMETHYLCELLULOSE SODIUM 1 DROP: 10 GEL OPHTHALMIC at 20:52

## 2025-02-17 RX ADMIN — CARBOXYMETHYLCELLULOSE SODIUM 1 DROP: 10 GEL OPHTHALMIC at 09:19

## 2025-02-17 ASSESSMENT — PAIN DESCRIPTION - LOCATION
LOCATION: HEAD

## 2025-02-17 ASSESSMENT — PAIN DESCRIPTION - DESCRIPTORS
DESCRIPTORS: ACHING

## 2025-02-17 ASSESSMENT — PAIN SCALES - GENERAL
PAINLEVEL_OUTOF10: 0
PAINLEVEL_OUTOF10: 3
PAINLEVEL_OUTOF10: 0
PAINLEVEL_OUTOF10: 3
PAINLEVEL_OUTOF10: 0
PAINLEVEL_OUTOF10: 5
PAINLEVEL_OUTOF10: 0

## 2025-02-17 ASSESSMENT — PAIN DESCRIPTION - ORIENTATION
ORIENTATION: ANTERIOR
ORIENTATION: ANTERIOR

## 2025-02-17 ASSESSMENT — PAIN - FUNCTIONAL ASSESSMENT
PAIN_FUNCTIONAL_ASSESSMENT: ACTIVITIES ARE NOT PREVENTED

## 2025-02-17 ASSESSMENT — PAIN DESCRIPTION - PAIN TYPE: TYPE: CHRONIC PAIN

## 2025-02-17 NOTE — CARE COORDINATION
Due to the patient only having VA insurance Per Perez Liaison with [facility] informed NAR[facility] the 02 will have to be ordered through the [facility].       However, [facility]. The NARINDER faxed over the 02 request to the command department along with the H&P and Facesheet. [phone].       02 will have to be ordered through the [facility].     VIOLET Gann

## 2025-02-17 NOTE — CARE COORDINATION
Discharge order noted for today. Orders received. No needs identified at this time. Case management remains available as needed.     [facility]       VIOLET Gann

## 2025-02-18 VITALS
HEART RATE: 95 BPM | DIASTOLIC BLOOD PRESSURE: 75 MMHG | WEIGHT: 156.53 LBS | HEIGHT: 67 IN | BODY MASS INDEX: 24.57 KG/M2 | TEMPERATURE: 98.2 F | SYSTOLIC BLOOD PRESSURE: 97 MMHG | RESPIRATION RATE: 18 BRPM | OXYGEN SATURATION: 95 %

## 2025-02-18 LAB
ANION GAP SERPL CALC-SCNC: 8 MMOL/L (ref 3–18)
BUN SERPL-MCNC: 19 MG/DL (ref 7–18)
BUN/CREAT SERPL: 23 (ref 12–20)
CALCIUM SERPL-MCNC: 9.4 MG/DL (ref 8.5–10.1)
CHLORIDE SERPL-SCNC: 109 MMOL/L (ref 100–111)
CO2 SERPL-SCNC: 23 MMOL/L (ref 21–32)
CREAT SERPL-MCNC: 0.82 MG/DL (ref 0.6–1.3)
ERYTHROCYTE [DISTWIDTH] IN BLOOD BY AUTOMATED COUNT: 14 % (ref 11.6–14.5)
GLUCOSE BLD STRIP.AUTO-MCNC: 120 MG/DL (ref 70–110)
GLUCOSE BLD STRIP.AUTO-MCNC: 208 MG/DL (ref 70–110)
GLUCOSE BLD STRIP.AUTO-MCNC: 82 MG/DL (ref 70–110)
GLUCOSE SERPL-MCNC: 79 MG/DL (ref 74–99)
HCT VFR BLD AUTO: 40.6 % (ref 36–48)
HGB BLD-MCNC: 13.2 G/DL (ref 13–16)
MCH RBC QN AUTO: 31.6 PG (ref 24–34)
MCHC RBC AUTO-ENTMCNC: 32.5 G/DL (ref 31–37)
MCV RBC AUTO: 97.1 FL (ref 78–100)
NRBC # BLD: 0 K/UL (ref 0–0.01)
NRBC BLD-RTO: 0 PER 100 WBC
PLATELET # BLD AUTO: 417 K/UL (ref 135–420)
PMV BLD AUTO: 9.5 FL (ref 9.2–11.8)
POTASSIUM SERPL-SCNC: 3.4 MMOL/L (ref 3.5–5.5)
RBC # BLD AUTO: 4.18 M/UL (ref 4.35–5.65)
SODIUM SERPL-SCNC: 140 MMOL/L (ref 136–145)
WBC # BLD AUTO: 12.6 K/UL (ref 4.6–13.2)

## 2025-02-18 PROCEDURE — 6360000002 HC RX W HCPCS

## 2025-02-18 PROCEDURE — 2500000003 HC RX 250 WO HCPCS: Performed by: HOSPITALIST

## 2025-02-18 PROCEDURE — 6370000000 HC RX 637 (ALT 250 FOR IP): Performed by: HOSPITALIST

## 2025-02-18 PROCEDURE — 94640 AIRWAY INHALATION TREATMENT: CPT

## 2025-02-18 PROCEDURE — 99232 SBSQ HOSP IP/OBS MODERATE 35: CPT | Performed by: HOSPITALIST

## 2025-02-18 PROCEDURE — 2700000000 HC OXYGEN THERAPY PER DAY

## 2025-02-18 PROCEDURE — 94761 N-INVAS EAR/PLS OXIMETRY MLT: CPT

## 2025-02-18 PROCEDURE — 80048 BASIC METABOLIC PNL TOTAL CA: CPT

## 2025-02-18 PROCEDURE — 2500000003 HC RX 250 WO HCPCS: Performed by: STUDENT IN AN ORGANIZED HEALTH CARE EDUCATION/TRAINING PROGRAM

## 2025-02-18 PROCEDURE — 97535 SELF CARE MNGMENT TRAINING: CPT

## 2025-02-18 PROCEDURE — 97116 GAIT TRAINING THERAPY: CPT

## 2025-02-18 PROCEDURE — 6360000002 HC RX W HCPCS: Performed by: STUDENT IN AN ORGANIZED HEALTH CARE EDUCATION/TRAINING PROGRAM

## 2025-02-18 PROCEDURE — 85027 COMPLETE CBC AUTOMATED: CPT

## 2025-02-18 PROCEDURE — 97110 THERAPEUTIC EXERCISES: CPT

## 2025-02-18 PROCEDURE — 6370000000 HC RX 637 (ALT 250 FOR IP): Performed by: STUDENT IN AN ORGANIZED HEALTH CARE EDUCATION/TRAINING PROGRAM

## 2025-02-18 PROCEDURE — 2580000003 HC RX 258: Performed by: STUDENT IN AN ORGANIZED HEALTH CARE EDUCATION/TRAINING PROGRAM

## 2025-02-18 PROCEDURE — 36415 COLL VENOUS BLD VENIPUNCTURE: CPT

## 2025-02-18 PROCEDURE — 6360000002 HC RX W HCPCS: Performed by: HOSPITALIST

## 2025-02-18 PROCEDURE — 82962 GLUCOSE BLOOD TEST: CPT

## 2025-02-18 PROCEDURE — 97530 THERAPEUTIC ACTIVITIES: CPT

## 2025-02-18 PROCEDURE — 2500000003 HC RX 250 WO HCPCS

## 2025-02-18 RX ORDER — PREDNISONE 20 MG/1
20 TABLET ORAL DAILY
Qty: 5 TABLET | Refills: 0 | Status: SHIPPED | OUTPATIENT
Start: 2025-02-18 | End: 2025-02-19

## 2025-02-18 RX ORDER — AZITHROMYCIN 500 MG/1
500 TABLET, FILM COATED ORAL DAILY
Qty: 4 TABLET | Refills: 0 | Status: SHIPPED | OUTPATIENT
Start: 2025-02-18 | End: 2025-02-19

## 2025-02-18 RX ORDER — ARFORMOTEROL TARTRATE 15 UG/2ML
15 SOLUTION RESPIRATORY (INHALATION)
Qty: 120 ML | Refills: 3 | Status: SHIPPED | OUTPATIENT
Start: 2025-02-18 | End: 2025-02-19

## 2025-02-18 RX ORDER — GUAIFENESIN/DEXTROMETHORPHAN 100-10MG/5
5 SYRUP ORAL EVERY 4 HOURS PRN
Qty: 120 ML | Refills: 0 | Status: SHIPPED | OUTPATIENT
Start: 2025-02-18 | End: 2025-02-19

## 2025-02-18 RX ORDER — FUROSEMIDE 40 MG/1
40 TABLET ORAL DAILY
Qty: 30 TABLET | Refills: 0 | Status: SHIPPED | OUTPATIENT
Start: 2025-02-18 | End: 2025-02-19

## 2025-02-18 RX ADMIN — CLOPIDOGREL BISULFATE 75 MG: 75 TABLET ORAL at 08:25

## 2025-02-18 RX ADMIN — ENOXAPARIN SODIUM 40 MG: 100 INJECTION SUBCUTANEOUS at 08:26

## 2025-02-18 RX ADMIN — CARVEDILOL 3.12 MG: 3.12 TABLET, FILM COATED ORAL at 08:25

## 2025-02-18 RX ADMIN — CARBOXYMETHYLCELLULOSE SODIUM 1 DROP: 10 GEL OPHTHALMIC at 13:06

## 2025-02-18 RX ADMIN — INSULIN LISPRO 5 UNITS: 100 INJECTION, SOLUTION INTRAVENOUS; SUBCUTANEOUS at 16:55

## 2025-02-18 RX ADMIN — ACETAMINOPHEN 650 MG: 325 TABLET ORAL at 14:13

## 2025-02-18 RX ADMIN — ARFORMOTEROL TARTRATE 15 MCG: 15 SOLUTION RESPIRATORY (INHALATION) at 07:57

## 2025-02-18 RX ADMIN — WATER 40 MG: 1 INJECTION INTRAMUSCULAR; INTRAVENOUS; SUBCUTANEOUS at 08:25

## 2025-02-18 RX ADMIN — PANTOPRAZOLE SODIUM 40 MG: 40 TABLET, DELAYED RELEASE ORAL at 08:25

## 2025-02-18 RX ADMIN — GUAIFENESIN AND DEXTROMETHORPHAN 5 ML: 100; 10 SYRUP ORAL at 08:34

## 2025-02-18 RX ADMIN — FLUOXETINE HYDROCHLORIDE 20 MG: 20 CAPSULE ORAL at 08:26

## 2025-02-18 RX ADMIN — BUDESONIDE 1 MG: 1 SUSPENSION RESPIRATORY (INHALATION) at 07:56

## 2025-02-18 RX ADMIN — SODIUM CHLORIDE, PRESERVATIVE FREE 10 ML: 5 INJECTION INTRAVENOUS at 08:27

## 2025-02-18 RX ADMIN — IPRATROPIUM BROMIDE AND ALBUTEROL SULFATE 1 DOSE: .5; 3 SOLUTION RESPIRATORY (INHALATION) at 12:24

## 2025-02-18 RX ADMIN — WATER 1000 MG: 1 INJECTION INTRAMUSCULAR; INTRAVENOUS; SUBCUTANEOUS at 10:51

## 2025-02-18 RX ADMIN — VITAM B12 100 MCG: 100 TAB at 08:25

## 2025-02-18 RX ADMIN — PREGABALIN 150 MG: 75 CAPSULE ORAL at 08:26

## 2025-02-18 RX ADMIN — CARBOXYMETHYLCELLULOSE SODIUM 1 DROP: 10 GEL OPHTHALMIC at 08:26

## 2025-02-18 RX ADMIN — Medication 3000 UNITS: at 08:25

## 2025-02-18 RX ADMIN — ASPIRIN 81 MG CHEWABLE TABLET 81 MG: 81 TABLET CHEWABLE at 08:26

## 2025-02-18 RX ADMIN — IPRATROPIUM BROMIDE AND ALBUTEROL SULFATE 1 DOSE: .5; 3 SOLUTION RESPIRATORY (INHALATION) at 07:56

## 2025-02-18 RX ADMIN — INSULIN LISPRO 1 UNITS: 100 INJECTION, SOLUTION INTRAVENOUS; SUBCUTANEOUS at 16:56

## 2025-02-18 RX ADMIN — INSULIN LISPRO 5 UNITS: 100 INJECTION, SOLUTION INTRAVENOUS; SUBCUTANEOUS at 12:35

## 2025-02-18 RX ADMIN — AZITHROMYCIN MONOHYDRATE 500 MG: 500 INJECTION, POWDER, LYOPHILIZED, FOR SOLUTION INTRAVENOUS at 13:07

## 2025-02-18 RX ADMIN — INSULIN LISPRO 5 UNITS: 100 INJECTION, SOLUTION INTRAVENOUS; SUBCUTANEOUS at 08:25

## 2025-02-18 ASSESSMENT — PAIN DESCRIPTION - LOCATION: LOCATION: HEAD

## 2025-02-18 ASSESSMENT — PAIN DESCRIPTION - DESCRIPTORS: DESCRIPTORS: ACHING

## 2025-02-18 ASSESSMENT — PAIN SCALES - GENERAL
PAINLEVEL_OUTOF10: 9
PAINLEVEL_OUTOF10: 0

## 2025-02-18 NOTE — CARE COORDINATION
[facility] just called back that they have approval for pt's home oxygen and [facility] and [facility] will contact pt.  Updated Dr. Ferguson and Nurse Adam.          JOSE MARTIN PradoN RN  Care Management

## 2025-02-18 NOTE — CARE COORDINATION
[facility] that pt's oxygen order has been sent to [facility] and when processed, they will deliver to the hospital today.        Makeda Ram, JOSE MARTINN RN  Care Management

## 2025-02-18 NOTE — PLAN OF CARE
Problem: Occupational Therapy - Adult  Goal: By Discharge: Performs self-care activities at highest level of function for planned discharge setting.  See evaluation for individualized goals.  Description: Occupational Therapy Goals:  Initiated 2/15/2025 to be met within 7-10 days.    1.  Patient will perform grooming with supervision/set-up while standing at the sink for > 2 min with Good balance.   2.  Patient will perform bathing with supervision/set-up.  3.  Patient will perform lower body dressing with supervision/set-up for seated and standing aspects.  4.  Patient will perform toilet transfers with supervision/set-up.  5.  Patient will perform all aspects of toileting with supervision/set-up.  6.  Patient will participate in upper extremity therapeutic exercise/activities with supervision/set-up for 8 minutes to improve endurance and UB strength needed for ADLs    7.  Patient will utilize energy conservation techniques during functional activities with verbal cues.    PLOF: Pt lives with spouse who assists him with IADLs and provides supervision for ADLs and functional mobility. Pt uses rollator or electric scooter for functional mobility.  Outcome: Progressing   OCCUPATIONAL THERAPY TREATMENT    Patient: Travis Rogel (80 y.o. male)  Date: 2/18/2025  Diagnosis: Hypoxia [R09.02]  Pneumonia due to infectious organism, unspecified laterality, unspecified part of lung [J18.9] Hypoxia      Precautions: Fall Risk, Other (Comment) (low vision),  ,  ,  ,  ,  ,  ,      Chart, occupational therapy assessment, plan of care, and goals were reviewed.  ASSESSMENT:  Pt presents in chair and declines toileting, ADL and standing grooming tasks at this time stating he has just got seated and is comfortable. Pt agrees to seated grooming with s/u and chair level core, glute and BUE ther ex to increase endurance and strength for improved I and safety during functional xfers and ADLs. Pt c/o persistent H/A. Notified nursing.

## 2025-02-18 NOTE — PLAN OF CARE
Problem: Physical Therapy - Adult  Goal: By Discharge: Performs mobility at highest level of function for planned discharge setting.  See evaluation for individualized goals.  Description: Physical Therapy Goals:  Initiated 2/16/2025 to be met within 7-10 days.    1.  Patient will move from supine to sit and sit to supine  in bed with modified independence.    2.  Patient will transfer from bed to chair and chair to bed with modified independence using the least restrictive device.  3.  Patient will perform sit to stand with modified independence.  4.  Patient will ambulate with modified independence for 50 feet with the least restrictive device.     PLOF: low vision, lives with wife who can assist as needed, level entry apartment, 1 story, uses rollator or scooter outside the home, no equipment in home     Outcome: Progressing     PHYSICAL THERAPY TREATMENT    Patient: Travis Rogel (80 y.o. male)  Date: 2/18/2025  Diagnosis: Hypoxia [R09.02]  Pneumonia due to infectious organism, unspecified laterality, unspecified part of lung [J18.9] Hypoxia      Precautions: Fall Risk, Other (Comment) (low vision),  ,  ,  ,  ,  ,  ,      ASSESSMENT:  Patient seen for PT follow up session with focus on OOB mobility. Received supine; agreeable. Assistance provided primarily for environmental awareness in setting of low vision. Able to complete bed mobility with supervision and transfers/gait using RW with CGA. Cues provided as listed below, with fair carryover noted. Of note, received on 4L supplemental O2. SpO2 95% at rest and as low as 86% following brief gait trials. Able to increase to >90% quickly with seated rest break and PLB. Continue PT POC.    Recommend for next PT session:  Gait training with less restrictive device    Progression toward goals:   []      Improving appropriately and progressing toward goals  [x]      Improving slowly and progressing toward goals  []      Not making progress toward goals and plan of

## 2025-02-18 NOTE — CARE COORDINATION
Met with pt.  No oxygen delivered yet.  He stated when he gets his oxygen, his wife will be picking him up.    1435:    Left a message for [facility] to call CM back with updates on oxygen delivery.      1449    [facility] called back stating she made a mistake of not getting approval from their chief before sending oxygen orders to [facility].  She stated [facility] was unable to process the order without auth.  She stated she is no waiting for the auth so [facility] can proceed but delivery will have to be tomorrow.  She stated she is trying to hasten this up before the weather gets bad tomorrow.  She stated she will call  back.    Updated Dr. Ferguson and nursing.      Makeda Ram, JOSE MARTINN RN  Care Management

## 2025-02-18 NOTE — CARE COORDINATION
Per Nurse Medina, pt's wife called stating [facility] called her that they will be delivering oxygen to pt's home and she can bring the oxygen to  pt.          JOSE MARTIN PradoN RN  Care Management

## 2025-02-18 NOTE — CARE COORDINATION
[facility] [phone] ext [phone] and spoke with Corrina.  She stated pt's  is Keyon Segovia at [phone].  Called Ms Segovia and she stated to call the Respiratory Therapist Suri Melendez at ext [phone] and fax the oxygen order and walk test to [phone].  She stated the home health orders can be faxed to her Ms Luca at [phone].    Oxygen orders faxed.  Home health orders faxed.    Tried calling Suri on ext [phone] but it was nt going.  Called  and was transferred.  Spoke with Suri.  Her extension is [phone].  She is waiting for the order and clinicals to get to her so she can start working on it.  She stated she has a lot of papers because she has been gone for a week.        Makeda Ram, JOSE MARTINN RN  Care Management

## 2025-02-19 RX ORDER — GUAIFENESIN/DEXTROMETHORPHAN 100-10MG/5
5 SYRUP ORAL EVERY 4 HOURS PRN
Qty: 120 ML | Refills: 0 | Status: SHIPPED | OUTPATIENT
Start: 2025-02-19 | End: 2025-03-01

## 2025-02-19 RX ORDER — FUROSEMIDE 40 MG/1
40 TABLET ORAL DAILY
Qty: 30 TABLET | Refills: 0 | Status: SHIPPED | OUTPATIENT
Start: 2025-02-19

## 2025-02-19 RX ORDER — PREDNISONE 20 MG/1
20 TABLET ORAL DAILY
Qty: 5 TABLET | Refills: 0 | Status: SHIPPED | OUTPATIENT
Start: 2025-02-19 | End: 2025-02-24

## 2025-02-19 RX ORDER — AZITHROMYCIN 500 MG/1
500 TABLET, FILM COATED ORAL DAILY
Qty: 4 TABLET | Refills: 0 | Status: SHIPPED | OUTPATIENT
Start: 2025-02-19 | End: 2025-02-23

## 2025-02-19 RX ORDER — ARFORMOTEROL TARTRATE 15 UG/2ML
15 SOLUTION RESPIRATORY (INHALATION)
Qty: 120 ML | Refills: 3 | Status: SHIPPED | OUTPATIENT
Start: 2025-02-19

## 2025-02-19 NOTE — PROGRESS NOTES
02/15/25 0746   Oxygen Therapy/Pulse Ox   O2 Device Heated high flow cannula   O2 Flow Rate (L/min) 35 L/min   FiO2  28 %     Will try to titrate to NC today.   
   02/15/25 1120   Oxygen Therapy/Pulse Ox   O2 Device Nasal cannula   O2 Flow Rate (L/min) 3 L/min       
  Physician Progress Note      PATIENT:               ARCADIO GEORGE  [facility] #:                  [id number]  :                       1945  ADMIT DATE:       2025 10:52 AM  DISCH DATE:        2025 7:09 PM  RESPONDING  PROVIDER #:        Stuart Olivarez MD          QUERY TEXT:    Patient admitted with Sepsis secondary to community-acquired pneumonia. Noted   documentation of sepsis in --> to the present . In order to support the   diagnosis of sepsis, please include additional clinical indicators in your   documentation.  Or please document if the diagnosis of sepsis has been ruled   out after further study    The medical record reflects the following:    Risk Factors:80-year-old male with a history of primary lung adenocarcinoma,   COPD, coronary artery disease status post CABG, type 2 diabetes, suspected   stage II chronic kidney disease, visual impairment and depression presents to   the emergency room secondary to shortness of breath.    Clinical Indicators:ADt: 2025 10:52 AM 1st 24 hrs VS and Labs  97.9 Temp axillary ,RR 48,41 ,36 ,38 ,,100,109,109 , LABS WBC 10.9 ,no   bands ,neuts 83 % ,neut absol. 9.05 ,TBili 1.2 ,Procal 0.59 , Lactic acid 2.36   ,glucose 368    MD PN  1. Sepsis secondary to community-acquired pneumonia-continue broad-spectrum IV   antibiotics.  Appreciate pulmonology input.  2. Community-acquired pneumonia-continue IV antibiotic therapy.  3. Acute respiratory failure with hypoxemia-continue supplemental oxygen,   currently on high flow nasal cannula.  4. Lactic acidemia secondary to sepsis and hypoxia-continue to monitor    Pulm-Acute Hypoxic respiratory failure, SOB on presentation, ABG with   hypoxemia, pO2 61% on FiO2 28%  Lung Cancer with Biopsy on 24, highly suspicious for adenocarcinoma,    s/p radiation [facility] Hypoxia here likely   2/2 pneumonia vs pulmonary edema  COPD, possible exacerbation with hypoxia and 
 [facility]  Pulmonary, Critical Care, and Sleep Medicine    Name: Travis Rogel MRN: [id number]   : 1945 Hospital: [facility]   Date: 2025        IMPRESSION:   Acute Hypoxic respiratory failure, SOB on presentation, ABG with hypoxemia, pO2 61% on FiO2 28%  Lung Cancer with Biopsy on 24, highly suspicious for adenocarcinoma,  s/p radiation therapy 2025 [facility] time. [facility] Hypoxia here likely 2/2 pneumonia vs pulmonary edema         COPD, possible exacerbation with hypoxia and increased sputum production. No PFT on record, [facility].  History of tobacco use disorder with 50 pack year, quit in . Hx of of cocaine use disorder  CAD s/p CABG  DM2 with hyperglycemia, last A1-C was 6.0% in   GERD  Basilar atelectasis vs effusion secondary to infiltrate vs edema.  Elevated alkaline phosphate, biliary disease vs bony breakdown        Patient Active Problem List   Diagnosis    Pneumonia due to infectious organism    Moderate episode of recurrent major depressive disorder (HCC)    Chronic post-traumatic stress 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    Cocaine use disorder, severe, dependence (HCC)    Alcohol use disorder, severe, dependence (HCC)    CHF (congestive heart failure) (HCC)    COPD without exacerbation (HCC)    GERD (gastroesophageal reflux disease)    Glaucoma    Degenerative disc disease, lumbar    Type 2 diabetes mellitus, with long-term current use of insulin (HCC)    Acute respiratory failure with hypoxemia    Sepsis with acute hypoxic respiratory failure without septic shock (HCC)    Primary lung adenocarcinoma (HCC)    Recurrent major depressive disorder, in remission (HCC)    Lactic acidemia    CKD (chronic kidney disease), stage II    Coronary artery disease involving native coronary artery of native heart without angina pectoris      PLAN:   Maintain aspiration precautions: HOB >30 degrees  SpO2 goal 88- 92% AVOID Over 
 [facility]  Pulmonary, Critical Care, and Sleep Medicine    Name: Travis Rogel MRN: [id number]   : 1945 Hospital: [facility]   Date: 2025        IMPRESSION:   Acute Hypoxic respiratory failure, SOB on presentation, ABG with hypoxemia, pO2 61% on FiO2 28%  Lung Cancer with Biopsy on 24, highly suspicious for adenocarcinoma,  s/p radiation therapy 2025 [facility] time. [facility] Hypoxia here likely 2/2 pneumonia vs pulmonary edema         COPD, possible exacerbation with hypoxia and increased sputum production. No PFT on record, [facility].  History of tobacco use disorder with 50 pack year, quit in . Hx of of cocaine use disorder  CAD s/p CABG  DM2 with hyperglycemia, last A1-C was 6.0% in   GERD  Basilar atelectasis vs effusion secondary to infiltrate vs edema.  Elevated alkaline phosphate, biliary disease vs bony breakdown        Patient Active Problem List   Diagnosis    Pneumonia due to infectious organism    Moderate episode of recurrent major depressive disorder (HCC)    Chronic post-traumatic stress 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    Cocaine use disorder, severe, dependence (HCC)    Alcohol use disorder, severe, dependence (HCC)    CHF (congestive heart failure) (HCC)    COPD without exacerbation (HCC)    GERD (gastroesophageal reflux disease)    Glaucoma    Degenerative disc disease, lumbar    Type 2 diabetes mellitus, with long-term current use of insulin (HCC)    Acute respiratory failure with hypoxemia    Sepsis with acute hypoxic respiratory failure without septic shock (HCC)    Primary lung adenocarcinoma (HCC)    Recurrent major depressive disorder, in remission (HCC)    Lactic acidemia    CKD (chronic kidney disease), stage II    Coronary artery disease involving native coronary artery of native heart without angina pectoris      PLAN:   Maintain aspiration precautions: HOB >30 degrees  SpO2 goal 88- 92% AVOID Over 
 [facility]  Pulmonary, Critical Care, and Sleep Medicine    Name: Travis Rogel MRN: [id number]   : 1945 Hospital: [facility]   Date: 2/15/2025        IMPRESSION:   Acute Hypoxic respiratory failure, SOB on presentation, ABG with hypoxemia, pO2 61% on FiO2 28%  Lung Cancer with Biopsy on 24, highly suspicious for adenocarcinoma,  s/p radiation therapy 2025 [facility] time. [facility] Hypoxia here likely 2/2 pneumonia vs pulmonary edema         COPD, possible exacerbation with hypoxia and increased sputum production. No PFT on record, [facility].  History of tobacco use disorder with 50 pack year, quit in . Hx of of cocaine use disorder  CAD s/p CABG  DM2 with hyperglycemia, last A1-C was 6.0% in   GERD  Basilar atelectasis vs effusion secondary to infiltrate vs edema.  Elevated alkaline phosphate, biliary disease vs bony breakdown        Patient Active Problem List   Diagnosis    Pneumonia due to infectious organism    Moderate episode of recurrent major depressive disorder (HCC)    Chronic post-traumatic stress 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    Cocaine use disorder, severe, dependence (HCC)    Alcohol use disorder, severe, dependence (HCC)    CHF (congestive heart failure) (HCC)    COPD without exacerbation (HCC)    GERD (gastroesophageal reflux disease)    Glaucoma    Degenerative disc disease, lumbar    Type 2 diabetes mellitus, with long-term current use of insulin (HCC)    Acute respiratory failure with hypoxemia    Sepsis with acute hypoxic respiratory failure without septic shock (HCC)    Primary lung adenocarcinoma (HCC)    Recurrent major depressive disorder, in remission (HCC)    Lactic acidemia    CKD (chronic kidney disease), stage II    Coronary artery disease involving native coronary artery of native heart without angina pectoris      PLAN:   Maintain aspiration precautions: HOB >30 degrees  SpO2 goal 88- 92% AVOID Over 
 completed the initial Spiritual Assessment of the patient, and offered Pastoral care support to the   bed 2301R Pentecostal Affiliation is: Zoroastrianism.       The  provided the following Interventions:  Initiated a relationship of care and support.   Explored issues of kasandra, belief, spirituality and Muslim/ritual needs while hospitalized. Patient has a high end of life care index score.  Listened empathically There is no advance directive on file.   Offered prayer and assurance of continued prayers on patients behalf.   Patient is active in a local [employer] orthodox.    The following outcomes were achieved:  Patient shared limited information about his medical narrative and spiritual journey/beliefs.  Patient processed feeling about current hospitalization.  Patient expressed gratitude for pastoral care visit.    Spiritual Health History and Assessment/Progress Note  [facility]    Crisis, Initial Encounter (iv-sa-eje), Follow up (patient has a high endof life care index score),  ,      Name: Travis Rogel MRN: [id number]    Age: 80 y.o.     Sex: male   Language: English   Yazidi: Zoroastrianism   Hypoxia     Date: 2/15/2025            Total Time Calculated: 7 min              Spiritual Assessment began in [facility] 2 CV STEPDOWN        Referral/Consult From: Rounding   Encounter Overview/Reason: Crisis, Initial Encounter (iv-sa-eje)  Service Provided For: Patient    Kasandra, Belief, Meaning:   Patient identifies as spiritual and is connected with a kasandra tradition or spiritual practice  Family/Friends No family/friends present      Importance and Influence:  Patient has spiritual/personal beliefs that influence decisions regarding their health  Family/Friends No family/friends present    Community:  Patient feels well-supported. Support system includes: Spouse/Partner  Family/Friends No family/friends present    Assessment and Plan of Care:     Patient Interventions include: Facilitated expression of thoughts 
0715- Bedside and Verbal shift change report given to Kate RN (oncoming nurse) by Sonido RN (offgoing nurse). Report included the following information SBAR, Intake/Output, MAR, Recent Results, and Cardiac Rhythm NSR .     1915- Bedside and Verbal shift change report given to Sonido RN (oncoming nurse) by Kate RN (offgoing nurse). Report included the following information SBAR, Intake/Output, MAR, Recent Results, and Cardiac Rhythm NSR.            
Bedside and Verbal shift change report given to Adam Kilpatrick RN   (oncoming nurse) by Pavan RN (offgoing nurse). Report included the following information Nurse Handoff Report, Index, MAR, Recent Results, and Cardiac Rhythm SR .     
Bedside and Verbal shift change report given to Adam Kilpatrick RN   (oncoming nurse) by SANDRA DAMON (offgoing nurse). Report included the following information Nurse Handoff Report, Index, MAR, Recent Results, and Cardiac Rhythm SR .     
Bedside and Verbal shift change report given to Pavan Dyer RN (oncoming nurse) by NELSON Love (offgoing nurse). Report included the following information Nurse Handoff Report, Intake/Output, MAR, Recent Results, and Cardiac Rhythm NSR .    
Insurance Required Oxygen Testing    Patient Name: Travis Rogel                 YOB: 1945  Attending Physician: Dr. Tristan Ferguson    Assessment Completed by: Ashley Ewing   Date / Time: 02/17/2025 1314    TEST #1 MUST ALWAYS BE DONE PRIOR TO DOING ANY FURTHER TESTING  USE WHOLE NUMBERS, “NOT RANGES” FOR OXYGEN TESTING DOCUMENTATION    TEST #1 - ROOM AIR AT REST:  58%; RECOVERY:  97 % AT 2 LPM      NOTE:  IF PATIENT'S OXYGEN SATURATION IS 88% OR BELOW DURING ROOM AIR AT REST, NO FURTHER TESTING IS   NECESSARY, HOWEVER, YOU MUST RECORD THE RECOVERY SATURATION WITH THE RECOVERY LITER PER MINUTE  USED DURING THE RECOVERY. IF THE RESULTS ARE GREATER THAN 88%, THEN PROCEED TO TEST #2 AND TEST #3.     Oxygen Via:      [x] Nasal Cannula   [] Simple Face Mask   [] Non-Re-Breather Mask  [] Partial Re-Breather Mask   [] Venturi Mask     NOTE:  OXYGEN TESTING MUST BE DONE WITHIN TWO DAYS PRIOR TO DISCHARGE.   
[facility]  Progress Note    Patient: Travis Rogel Age: 80 y.o. : 1945 MR#: [id number] SSN: [id number]  Date/Time: 2025     Subjective:     Patient seen evaluated, lying in bed, no acute distress.  Patient is currently on supplemental oxygen.    80-year-old male with a history of primary lung adenocarcinoma, COPD, coronary artery disease status post CABG, type 2 diabetes, suspected stage II chronic kidney disease, visual impairment and depression presents to the emergency room secondary to shortness of breath.  Patient has also been having cough with streaks of blood.  Patient was diagnosed with lung cancer back in 2024 and underwent radiation therapy 2020 In [place].    ER evaluation-patient noted to have tachycardia and tachypnea, hypoxic on room air placed on high flow nasal cannula.  Chest x-ray showed bilateral infiltrates concerning for pneumonia.  Pulmonology consulted, patient admitted to the hospital further evaluation.      -patient seen evaluated, lying in bed, no acute distress.  Patient has improved however it appears that he will require supplemental oxygen.  Advised nurse to do a walk test.  Per pulmonology can switch to oral antibiotics and p.o. prednisone, bronchodilators at the time of discharge.  Anticipate discharge in a.m.      Assessment/Plan:     Sepsis secondary to community-acquired pneumonia-continue broad-spectrum IV antibiotics.  Appreciate pulmonology input.  Community-acquired pneumonia-continue IV antibiotic therapy.  Acute respiratory failure with hypoxemia-continue supplemental oxygen, currently on high flow nasal cannula.  Lactic acidemia secondary to sepsis and hypoxia-continue to monitor  H/o primary lung adenocarcinoma-recently received radiation, 2025.  History of COPD without acute exacerbation-continue bronchodilator therapy, supplemental oxygen.  History of CAD-continue aspirin, statin 
Nurse paged to state that patient desatted into the 60s on room air.  Will order home oxygen.  
Patient has increased WOB and placed on HFNC for support of breathing   
Patients meds sent to [facility]. He was unable to fill them at [facility].  
Wife is here to  patient with the O2 tank.  DC instruction given to pt and wife.  PIV and tele mon removed.  Belongings with pt.  
HPI.    Objective:   Vital Signs:    /65   Pulse (!) 104   Temp 97.9 °F (36.6 °C) (Oral)   Resp 20   Ht 1.702 m (5' 7\")   Wt 71 kg (156 lb 8.4 oz)   SpO2 94%   BMI 24.52 kg/m²             Temp (24hrs), Av.8 °F (36.6 °C), Min:97.3 °F (36.3 °C), Max:98.4 °F (36.9 °C)       Intake/Output:   Last shift:       07 - 1900  In: 240 [P.O.:240]  Out: 150 [Urine:150]  Last 3 shifts: 02/15 1901 -  0700  In: 720 [P.O.:720]  Out: 2475 [Urine:2475]    Intake/Output Summary (Last 24 hours) at 2025 1255  Last data filed at 2025 0930  Gross per 24 hour   Intake 600 ml   Output 1900 ml   Net -1300 ml        Physical Exam:   General:  Alert, cooperative, no distress, appears stated age.   Head:  Normocephalic, without obvious abnormality, atraumatic.   Eyes:  Conjunctivae/corneas clear. PERRL, EOMs intact.   Nose: Nares normal. Septum midline. Mucosa normal. No drainage or sinus tenderness.   Throat: Lips, mucosa, and tongue normal. Teeth and gums normal.   Neck: Supple, symmetrical, trachea midline, no adenopathy, thyroid: no enlargment/tenderness/nodules, no carotid bruit and no JVD.   Back:   Symmetric, no curvature. ROM normal.   Lungs:    Basilar crackles to auscultation bilaterally. +intermittent cough and self suctioning moderate brownish secretions.   Chest wall:  No tenderness or deformity.- No crepitus   Heart:  Regular rate and rhythm, S1, S2 normal, no murmur, click, rub or gallop.   Abdomen:   Soft, non-tender. Bowel sounds normal. No masses,  No organomegaly.   Extremities: Extremities normal, atraumatic, no cyanosis or edema.   Pulses: 2+ and symmetric all extremities.   Skin: Skin color, texture, turgor normal. No rashes or lesions   Lymph nodes:       Cervical, supraclavicular nodes: unremarkable           DATA:  Labs:  Recent Labs     02/15/25  0542 25  1500 25  0319   WBC 9.1 9.2 10.9   HGB 12.7* 12.2* 12.1*   HCT 40.8 38.3 37.4    358 379     Recent 
110 mg/dL   POCT Glucose    Collection Time: 02/14/25  8:14 PM   Result Value Ref Range    POC Glucose 151 (H) 70 - 110 mg/dL   Comprehensive Metabolic Panel    Collection Time: 02/15/25  5:42 AM   Result Value Ref Range    Sodium 135 (L) 136 - 145 mmol/L    Potassium 4.1 3.5 - 5.5 mmol/L    Chloride 108 100 - 111 mmol/L    CO2 20 (L) 21 - 32 mmol/L    Anion Gap 7 3.0 - 18 mmol/L    Glucose 178 (H) 74 - 99 mg/dL    BUN 28 (H) 7.0 - 18 MG/DL    Creatinine 1.05 0.6 - 1.3 MG/DL    BUN/Creatinine Ratio 27 (H) 12 - 20      Est, Glom Filt Rate 71 >60 ml/min/1.73m2    Calcium 8.8 8.5 - 10.1 MG/DL    Total Bilirubin 1.0 0.2 - 1.0 MG/DL    ALT 11 (L) 16 - 61 U/L    AST 20 10 - 38 U/L    Alk Phosphatase 131 (H) 45 - 117 U/L    Total Protein 7.9 6.4 - 8.2 g/dL    Albumin 2.5 (L) 3.4 - 5.0 g/dL    Globulin 5.4 (H) 2.0 - 4.0 g/dL    Albumin/Globulin Ratio 0.5 (L) 0.8 - 1.7     CBC with Auto Differential    Collection Time: 02/15/25  5:42 AM   Result Value Ref Range    WBC 9.1 4.6 - 13.2 K/uL    RBC 4.11 (L) 4.35 - 5.65 M/uL    Hemoglobin 12.7 (L) 13.0 - 16.0 g/dL    Hematocrit 40.8 36.0 - 48.0 %    MCV 99.3 78.0 - 100.0 FL    MCH 30.9 24.0 - 34.0 PG    MCHC 31.1 31.0 - 37.0 g/dL    RDW 14.4 11.6 - 14.5 %    Platelets 343 135 - 420 K/uL    MPV 10.0 9.2 - 11.8 FL    Nucleated RBCs 0.0 0  WBC    nRBC 0.00 0.00 - 0.01 K/uL    Neutrophils % 72.5 40.0 - 73.0 %    Lymphocytes % 11.2 (L) 21.0 - 52.0 %    Monocytes % 11.2 (H) 3.0 - 10.0 %    Eosinophils % 3.8 0.0 - 5.0 %    Basophils % 0.5 0.0 - 2.0 %    Immature Granulocytes % 0.8 (H) 0.0 - 0.5 %    Neutrophils Absolute 6.62 1.80 - 8.00 K/UL    Lymphocytes Absolute 1.02 0.90 - 3.60 K/UL    Monocytes Absolute 1.02 0.05 - 1.20 K/UL    Eosinophils Absolute 0.35 0.00 - 0.40 K/UL    Basophils Absolute 0.05 0.00 - 0.10 K/UL    Immature Granulocytes Absolute 0.07 (H) 0.00 - 0.04 K/UL    Differential Type AUTOMATED     Lactic Acid    Collection Time: 02/15/25  5:42 AM   Result Value Ref
